# Patient Record
Sex: FEMALE | HISPANIC OR LATINO | Employment: FULL TIME | URBAN - METROPOLITAN AREA
[De-identification: names, ages, dates, MRNs, and addresses within clinical notes are randomized per-mention and may not be internally consistent; named-entity substitution may affect disease eponyms.]

---

## 2020-12-11 ENCOUNTER — OFFICE VISIT (OUTPATIENT)
Dept: FAMILY MEDICINE CLINIC | Facility: CLINIC | Age: 45
End: 2020-12-11

## 2020-12-11 VITALS
HEART RATE: 96 BPM | TEMPERATURE: 97.6 F | BODY MASS INDEX: 36.89 KG/M2 | OXYGEN SATURATION: 98 % | WEIGHT: 208.2 LBS | HEIGHT: 63 IN | DIASTOLIC BLOOD PRESSURE: 78 MMHG | RESPIRATION RATE: 18 BRPM | SYSTOLIC BLOOD PRESSURE: 132 MMHG

## 2020-12-11 DIAGNOSIS — E03.9 HYPOTHYROIDISM, UNSPECIFIED TYPE: ICD-10-CM

## 2020-12-11 DIAGNOSIS — M62.838 MUSCLE SPASM: ICD-10-CM

## 2020-12-11 DIAGNOSIS — Z12.31 SCREENING MAMMOGRAM, ENCOUNTER FOR: ICD-10-CM

## 2020-12-11 DIAGNOSIS — E11.69 TYPE 2 DIABETES MELLITUS WITH OTHER SPECIFIED COMPLICATION, WITHOUT LONG-TERM CURRENT USE OF INSULIN (HCC): Primary | ICD-10-CM

## 2020-12-11 DIAGNOSIS — Z00.00 ROUTINE CHECK-UP: ICD-10-CM

## 2020-12-11 PROBLEM — E11.9 DIABETES MELLITUS (HCC): Status: ACTIVE | Noted: 2020-12-11

## 2020-12-11 LAB — SL AMB POCT HEMOGLOBIN AIC: 8.2 (ref ?–6.5)

## 2020-12-11 PROCEDURE — 83036 HEMOGLOBIN GLYCOSYLATED A1C: CPT | Performed by: FAMILY MEDICINE

## 2020-12-11 PROCEDURE — 99203 OFFICE O/P NEW LOW 30 MIN: CPT | Performed by: FAMILY MEDICINE

## 2020-12-11 RX ORDER — LEVOTHYROXINE SODIUM 112 UG/1
112 TABLET ORAL
Qty: 30 TABLET | Refills: 3 | Status: SHIPPED | OUTPATIENT
Start: 2020-12-11 | End: 2021-01-11 | Stop reason: ALTCHOICE

## 2020-12-11 RX ORDER — LEVOTHYROXINE SODIUM 0.1 MG/1
100 TABLET ORAL DAILY
COMMUNITY
End: 2020-12-11 | Stop reason: ALTCHOICE

## 2020-12-11 RX ORDER — METHOCARBAMOL 500 MG/1
500 TABLET, FILM COATED ORAL DAILY
Qty: 30 TABLET | Refills: 0 | Status: SHIPPED | OUTPATIENT
Start: 2020-12-11 | End: 2021-01-11 | Stop reason: SDUPTHER

## 2020-12-22 ENCOUNTER — APPOINTMENT (OUTPATIENT)
Dept: LAB | Facility: HOSPITAL | Age: 45
End: 2020-12-22
Payer: COMMERCIAL

## 2020-12-22 DIAGNOSIS — E03.9 HYPOTHYROIDISM, UNSPECIFIED TYPE: ICD-10-CM

## 2020-12-22 LAB
ALBUMIN SERPL BCP-MCNC: 3.5 G/DL (ref 3.5–5)
ALP SERPL-CCNC: 80 U/L (ref 46–116)
ALT SERPL W P-5'-P-CCNC: 33 U/L (ref 12–78)
ANION GAP SERPL CALCULATED.3IONS-SCNC: 9 MMOL/L (ref 4–13)
AST SERPL W P-5'-P-CCNC: 15 U/L (ref 5–45)
BASOPHILS # BLD AUTO: 0.03 THOUSANDS/ΜL (ref 0–0.1)
BASOPHILS NFR BLD AUTO: 0 % (ref 0–1)
BILIRUB SERPL-MCNC: 0.34 MG/DL (ref 0.2–1)
BUN SERPL-MCNC: 13 MG/DL (ref 5–25)
CALCIUM SERPL-MCNC: 9.3 MG/DL (ref 8.3–10.1)
CHLORIDE SERPL-SCNC: 104 MMOL/L (ref 100–108)
CHOLEST SERPL-MCNC: 272 MG/DL (ref 50–200)
CO2 SERPL-SCNC: 28 MMOL/L (ref 21–32)
CREAT SERPL-MCNC: 0.62 MG/DL (ref 0.6–1.3)
EOSINOPHIL # BLD AUTO: 0.13 THOUSAND/ΜL (ref 0–0.61)
EOSINOPHIL NFR BLD AUTO: 2 % (ref 0–6)
ERYTHROCYTE [DISTWIDTH] IN BLOOD BY AUTOMATED COUNT: 12.7 % (ref 11.6–15.1)
GFR SERPL CREATININE-BSD FRML MDRD: 110 ML/MIN/1.73SQ M
GLUCOSE P FAST SERPL-MCNC: 143 MG/DL (ref 65–99)
HCT VFR BLD AUTO: 40.2 % (ref 34.8–46.1)
HDLC SERPL-MCNC: 47 MG/DL
HGB BLD-MCNC: 13.8 G/DL (ref 11.5–15.4)
IMM GRANULOCYTES # BLD AUTO: 0.04 THOUSAND/UL (ref 0–0.2)
IMM GRANULOCYTES NFR BLD AUTO: 1 % (ref 0–2)
LDLC SERPL CALC-MCNC: 173 MG/DL (ref 0–100)
LYMPHOCYTES # BLD AUTO: 2.98 THOUSANDS/ΜL (ref 0.6–4.47)
LYMPHOCYTES NFR BLD AUTO: 41 % (ref 14–44)
MCH RBC QN AUTO: 31.2 PG (ref 26.8–34.3)
MCHC RBC AUTO-ENTMCNC: 34.3 G/DL (ref 31.4–37.4)
MCV RBC AUTO: 91 FL (ref 82–98)
MONOCYTES # BLD AUTO: 0.53 THOUSAND/ΜL (ref 0.17–1.22)
MONOCYTES NFR BLD AUTO: 7 % (ref 4–12)
NEUTROPHILS # BLD AUTO: 3.57 THOUSANDS/ΜL (ref 1.85–7.62)
NEUTS SEG NFR BLD AUTO: 49 % (ref 43–75)
NONHDLC SERPL-MCNC: 225 MG/DL
NRBC BLD AUTO-RTO: 0 /100 WBCS
PLATELET # BLD AUTO: 328 THOUSANDS/UL (ref 149–390)
PMV BLD AUTO: 10.7 FL (ref 8.9–12.7)
POTASSIUM SERPL-SCNC: 4.2 MMOL/L (ref 3.5–5.3)
PROT SERPL-MCNC: 7.8 G/DL (ref 6.4–8.2)
RBC # BLD AUTO: 4.42 MILLION/UL (ref 3.81–5.12)
SODIUM SERPL-SCNC: 141 MMOL/L (ref 136–145)
TRIGL SERPL-MCNC: 259 MG/DL
TSH SERPL DL<=0.05 MIU/L-ACNC: 1.79 UIU/ML (ref 0.36–3.74)
WBC # BLD AUTO: 7.28 THOUSAND/UL (ref 4.31–10.16)

## 2020-12-22 PROCEDURE — 85025 COMPLETE CBC W/AUTO DIFF WBC: CPT

## 2020-12-22 PROCEDURE — 80053 COMPREHEN METABOLIC PANEL: CPT

## 2020-12-22 PROCEDURE — 84443 ASSAY THYROID STIM HORMONE: CPT

## 2020-12-22 PROCEDURE — 80061 LIPID PANEL: CPT

## 2020-12-22 PROCEDURE — 36415 COLL VENOUS BLD VENIPUNCTURE: CPT

## 2021-01-11 ENCOUNTER — OFFICE VISIT (OUTPATIENT)
Dept: FAMILY MEDICINE CLINIC | Facility: CLINIC | Age: 46
End: 2021-01-11

## 2021-01-11 VITALS
WEIGHT: 210 LBS | OXYGEN SATURATION: 98 % | DIASTOLIC BLOOD PRESSURE: 80 MMHG | HEIGHT: 63 IN | RESPIRATION RATE: 16 BRPM | TEMPERATURE: 98 F | HEART RATE: 90 BPM | BODY MASS INDEX: 37.21 KG/M2 | SYSTOLIC BLOOD PRESSURE: 124 MMHG

## 2021-01-11 DIAGNOSIS — M62.838 MUSCLE SPASM: ICD-10-CM

## 2021-01-11 DIAGNOSIS — E03.9 HYPOTHYROIDISM, UNSPECIFIED TYPE: ICD-10-CM

## 2021-01-11 DIAGNOSIS — M79.89 LEG SWELLING: ICD-10-CM

## 2021-01-11 DIAGNOSIS — E78.5 HYPERLIPIDEMIA, UNSPECIFIED HYPERLIPIDEMIA TYPE: ICD-10-CM

## 2021-01-11 DIAGNOSIS — E11.69 TYPE 2 DIABETES MELLITUS WITH OTHER SPECIFIED COMPLICATION, WITHOUT LONG-TERM CURRENT USE OF INSULIN (HCC): Primary | ICD-10-CM

## 2021-01-11 DIAGNOSIS — K21.9 GASTROESOPHAGEAL REFLUX DISEASE WITHOUT ESOPHAGITIS: ICD-10-CM

## 2021-01-11 PROCEDURE — 3008F BODY MASS INDEX DOCD: CPT | Performed by: INTERNAL MEDICINE

## 2021-01-11 PROCEDURE — 99213 OFFICE O/P EST LOW 20 MIN: CPT | Performed by: INTERNAL MEDICINE

## 2021-01-11 PROCEDURE — 1036F TOBACCO NON-USER: CPT | Performed by: INTERNAL MEDICINE

## 2021-01-11 RX ORDER — ATORVASTATIN CALCIUM 40 MG/1
40 TABLET, FILM COATED ORAL DAILY
Qty: 90 TABLET | Refills: 0 | Status: SHIPPED | OUTPATIENT
Start: 2021-01-11 | End: 2021-02-08 | Stop reason: SDUPTHER

## 2021-01-11 RX ORDER — GABAPENTIN 100 MG/1
100 CAPSULE ORAL 3 TIMES DAILY
Qty: 90 CAPSULE | Refills: 0 | Status: SHIPPED | OUTPATIENT
Start: 2021-01-11 | End: 2021-03-15

## 2021-01-11 RX ORDER — LEVOTHYROXINE SODIUM 0.1 MG/1
100 TABLET ORAL DAILY
Qty: 30 TABLET | Refills: 2 | Status: CANCELLED | OUTPATIENT
Start: 2021-01-11

## 2021-01-11 RX ORDER — METHOCARBAMOL 500 MG/1
500 TABLET, FILM COATED ORAL DAILY
Qty: 30 TABLET | Refills: 0 | Status: SHIPPED | OUTPATIENT
Start: 2021-01-11 | End: 2021-03-15

## 2021-01-11 RX ORDER — LEVOTHYROXINE SODIUM 112 UG/1
112 TABLET ORAL
Qty: 30 TABLET | Refills: 0 | Status: SHIPPED | OUTPATIENT
Start: 2021-01-11 | End: 2021-02-08 | Stop reason: SDUPTHER

## 2021-01-11 RX ORDER — FUROSEMIDE 20 MG/1
20 TABLET ORAL DAILY
Qty: 15 TABLET | Refills: 0 | Status: SHIPPED | OUTPATIENT
Start: 2021-01-11 | End: 2021-12-20 | Stop reason: SDUPTHER

## 2021-01-11 NOTE — PROGRESS NOTES
Assessment/Plan:    Hypothyroidism  -most recent TSH within normal limits  -currently on levothyroxine 112 mcg daily  -stated to have noticed sporadic palpitations since re-starting medication, was not taking it prior for about almost a year and recently re-started it in prior visit  -will send for T4 lab work for evaluation and assess if dose adjustment is needed    Diabetes mellitus ([facility] )    Lab Results   Component Value Date    HGBA1C 8 2 (A) 12/11/2020     -on Janumet 50/500 BID  -previously on Insulin back in [place] and was adjusted to PO medications  -hba1c has been as high as 10% and as low as 6%  -patient states to have been taking it only once a day since had some difficulties obtaining refills at pharmacy  -she states that tolerated medication well so far  -verified dosing and prescription and re-sent to pharmacy, encouraged patient to check with pharmacy to make sure appropriate refills are given  -encouraged to continue adherence to Diabetic diet   -will continue to assess during next visit and adjust medication as needed  -interested in diabetic counseling and nutrition, sent referral  -dry skin in foot soles, has been applying Gold Bond moisturizer for diabetics and working so far, will send referral to Podiatry as well    Hyperlipidemia  -reviewed most recent lipid panel with elevated cholesterol, TG and LDL  -states to have previously used statin in [place]   -will re-start on atorvastatin 40 mg daily  -repeat lipid panel after starting therapy in about 3 months to assess     Muscle spasm  -has tried robaxin, which states helps during sleep but when wakes up still has tension in area of the neck  -physical examination remarkable for neck muscle tension  -encouraged that can take Aleve PRN as well in combination to robaxin at night  -patient willing to try physical therapy, will send referral     Gastroesophageal reflux disease without esophagitis  -Hx of GERD for many years  -takes omeprazole OTC which works, will continue       Diagnoses and all orders for this visit:    Type 2 diabetes mellitus with other specified complication, without long-term current use of insulin (HCC)  -     sitaGLIPtin-metFORMIN (JANUMET)  MG per tablet; Take 2 tablets by mouth two times a day with meals  -     Ambulatory referral to Nutrition Services; Future  -     Ambulatory referral to Diabetic Education; Future  -     Ambulatory referral to Podiatry; Future  -     gabapentin (NEURONTIN) 100 mg capsule; Take 1 capsule (100 mg total) by mouth 3 (three) times a day    Hyperlipidemia, unspecified hyperlipidemia type  -     atorvastatin (LIPITOR) 40 mg tablet; Take 1 tablet (40 mg total) by mouth daily  -     Lipid panel; Future    Muscle spasm  -     Ambulatory referral to Physical Therapy; Future  -     methocarbamol (ROBAXIN) 500 mg tablet; Take 1 tablet (500 mg total) by mouth daily    Hypothyroidism, unspecified type  -     T4, free; Future  -     levothyroxine 112 mcg tablet; Take 1 tablet (112 mcg total) by mouth daily in the early morning    BMI 37 0-37 9, adult    Leg swelling  -     furosemide (LASIX) 20 mg tablet; Take 1 tablet (20 mg total) by mouth daily    Gastroesophageal reflux disease without esophagitis    Other orders  -     Cancel: levothyroxine 100 mcg tablet; Take 1 tablet (100 mcg total) by mouth daily          Subjective:      Patient ID: Nena Heath is a 41 y o  female  Case of 40 y/o female who came today for follow up  She indicates to have been compliant with her medications, although has been taking her DM medication inly once due to some difficulties with refills in the pharmacy  She states to still have significant neck muscle spasms and is willing to try physical therapy                The following portions of the patient's history were reviewed and updated as appropriate: allergies, current medications, past family history, past medical history, past social history, past surgical history and problem list     Review of Systems   Constitutional: Negative for fever  Eyes: Negative for visual disturbance  Respiratory: Negative for cough, shortness of breath and wheezing  Cardiovascular: Negative for chest pain, palpitations and leg swelling  Gastrointestinal: Negative for abdominal pain, diarrhea, nausea and vomiting  Musculoskeletal:        Neck muscle spasm   Skin: Negative for color change, pallor, rash and wound  Neurological: Negative for headaches  Psychiatric/Behavioral: The patient is not nervous/anxious  Objective:      /80 (BP Location: Right arm, Patient Position: Sitting, Cuff Size: Large)   Pulse 90   Temp 98 °F (36 7 °C) (Temporal)   Resp 16   Ht 5' 3" (1 6 m)   Wt 95 3 kg (210 lb)   SpO2 98%   BMI 37 20 kg/m²          Physical Exam  Vitals signs reviewed  Constitutional:       General: She is not in acute distress  Appearance: Normal appearance  She is not ill-appearing, toxic-appearing or diaphoretic  Eyes:      Extraocular Movements: Extraocular movements intact  Cardiovascular:      Pulses: Normal pulses  Heart sounds: Normal heart sounds  No murmur  Pulmonary:      Effort: Pulmonary effort is normal  No respiratory distress  Breath sounds: Normal breath sounds  No wheezing  Abdominal:      General: Abdomen is flat  Bowel sounds are normal  There is no distension  Palpations: Abdomen is soft  Tenderness: There is no abdominal tenderness  Musculoskeletal: Normal range of motion  General: No swelling, tenderness, deformity or signs of injury  Right lower leg: No edema  Left lower leg: No edema  Comments: Neck muscle tension upon palpation   Skin:     General: Skin is warm  Coloration: Skin is not jaundiced  Findings: No bruising or lesion  Neurological:      General: No focal deficit present  Mental Status: She is alert and oriented to person, place, and time     Psychiatric:         Mood and Affect: Mood normal

## 2021-01-12 NOTE — ASSESSMENT & PLAN NOTE
-reviewed most recent lipid panel with elevated cholesterol, TG and LDL  -states to have previously used statin in [place]   -will re-start on atorvastatin 40 mg daily  -repeat lipid panel after starting therapy in about 3 months to assess

## 2021-01-12 NOTE — ASSESSMENT & PLAN NOTE
Lab Results   Component Value Date    HGBA1C 8 2 (A) 12/11/2020     -on Janumet 50/500 BID  -previously on Insulin back in [place] and was adjusted to PO medications  -hba1c has been as high as 10% and as low as 6%  -patient states to have been taking it only once a day since had some difficulties obtaining refills at pharmacy  -she states that tolerated medication well so far  -verified dosing and prescription and re-sent to pharmacy, encouraged patient to check with pharmacy to make sure appropriate refills are given  -encouraged to continue adherence to Diabetic diet   -will continue to assess during next visit and adjust medication as needed  -interested in diabetic counseling and nutrition, sent referral  -dry skin in foot soles, has been applying Gold Bond moisturizer for diabetics and working so far, will send referral to Podiatry as well

## 2021-01-12 NOTE — ASSESSMENT & PLAN NOTE
-has tried robaxin, which states helps during sleep but when wakes up still has tension in area of the neck  -physical examination remarkable for neck muscle tension  -encouraged that can take Aleve PRN as well in combination to robaxin at night  -patient willing to try physical therapy, will send referral

## 2021-01-12 NOTE — ASSESSMENT & PLAN NOTE
-most recent TSH within normal limits  -currently on levothyroxine 112 mcg daily  -stated to have noticed sporadic palpitations since re-starting medication, was not taking it prior for about almost a year and recently re-started it in prior visit  -will send for T4 lab work for evaluation and assess if dose adjustment is needed

## 2021-01-26 ENCOUNTER — HOSPITAL ENCOUNTER (EMERGENCY)
Facility: HOSPITAL | Age: 46
Discharge: HOME | End: 2021-01-26
Attending: EMERGENCY MEDICINE | Admitting: EMERGENCY MEDICINE
Payer: COMMERCIAL

## 2021-01-26 VITALS
RESPIRATION RATE: 16 BRPM | HEART RATE: 86 BPM | SYSTOLIC BLOOD PRESSURE: 118 MMHG | WEIGHT: 209.44 LBS | DIASTOLIC BLOOD PRESSURE: 70 MMHG | OXYGEN SATURATION: 99 % | TEMPERATURE: 98.5 F | BODY MASS INDEX: 37.1 KG/M2

## 2021-01-26 DIAGNOSIS — M54.50 ACUTE LOW BACK PAIN: Primary | ICD-10-CM

## 2021-01-26 PROCEDURE — 99283 EMERGENCY DEPT VISIT LOW MDM: CPT

## 2021-01-26 PROCEDURE — 99284 EMERGENCY DEPT VISIT MOD MDM: CPT | Performed by: PHYSICIAN ASSISTANT

## 2021-01-26 PROCEDURE — 96372 THER/PROPH/DIAG INJ SC/IM: CPT

## 2021-01-26 RX ORDER — METHOCARBAMOL 750 MG/1
750 TABLET, FILM COATED ORAL 2 TIMES DAILY PRN
Qty: 20 TABLET | Refills: 0 | Status: SHIPPED | OUTPATIENT
Start: 2021-01-26 | End: 2021-03-15

## 2021-01-26 RX ORDER — NAPROXEN 500 MG/1
500 TABLET ORAL 2 TIMES DAILY PRN
Qty: 30 TABLET | Refills: 0 | Status: SHIPPED | OUTPATIENT
Start: 2021-01-26 | End: 2021-01-26 | Stop reason: SDUPTHER

## 2021-01-26 RX ORDER — METHOCARBAMOL 500 MG/1
1000 TABLET, FILM COATED ORAL ONCE
Status: COMPLETED | OUTPATIENT
Start: 2021-01-26 | End: 2021-01-26

## 2021-01-26 RX ORDER — NAPROXEN 500 MG/1
500 TABLET ORAL 2 TIMES DAILY PRN
Qty: 30 TABLET | Refills: 0 | Status: SHIPPED | OUTPATIENT
Start: 2021-01-26 | End: 2022-05-09

## 2021-01-26 RX ORDER — KETOROLAC TROMETHAMINE 30 MG/ML
15 INJECTION, SOLUTION INTRAMUSCULAR; INTRAVENOUS ONCE
Status: COMPLETED | OUTPATIENT
Start: 2021-01-26 | End: 2021-01-26

## 2021-01-26 RX ORDER — METHOCARBAMOL 750 MG/1
750 TABLET, FILM COATED ORAL 2 TIMES DAILY PRN
Qty: 20 TABLET | Refills: 0 | Status: SHIPPED | OUTPATIENT
Start: 2021-01-26 | End: 2021-01-26 | Stop reason: SDUPTHER

## 2021-01-26 RX ADMIN — METHOCARBAMOL 1000 MG: 500 TABLET ORAL at 17:29

## 2021-01-26 RX ADMIN — KETOROLAC TROMETHAMINE 15 MG: 30 INJECTION, SOLUTION INTRAMUSCULAR at 17:30

## 2021-01-26 NOTE — Clinical Note
Steve Bhardwja was seen and treated in our emergency department on 1/26/2021  Diagnosis:     Mary Hurley    She may return on this date: 01/28/2021         If you have any questions or concerns, please don't hesitate to call        Saul Valladares PA-C    ______________________________           _______________          _______________  Hospital Representative                              Date                                Time

## 2021-01-26 NOTE — DISCHARGE INSTRUCTIONS
Please refer to the attached information for strict return instructions  If symptoms worsen or new symptoms develop please return to the ER  Please follow up with our spine program if symptoms persist  Use prescribed medications as needed for pain/spasm

## 2021-01-27 NOTE — ED PROVIDER NOTES
History  Chief Complaint   Patient presents with    Back Pain     Pt w/ Hx of chronic back pain c/o worsening lower back pain since this am; Pt lifts boxes at work; Pt denies any bowel or urinary symptoms      Cheryle Marte is a 36-year-old female, history of chronic low back pain, presenting with lower back pain since this morning  Patient denies specific injury or trauma, but does report frequently lifting heavy boxes at work  Denies radiation of pain to lower extremities or to abdomen  Denies nausea, vomiting, or diarrhea  Denies dysuria, urinary urgency, or urinary frequency  Denies gross hematuria  No lower extremity numbness, or weakness  No loss of control of bowel or bladder function  No saddle anesthesia  No fevers/chills/IVDA  History provided by:  Patient   used: No    Back Pain  Location:  Lumbar spine  Quality:  Aching and cramping  Duration:  1 day  Timing:  Constant  Progression:  Waxing and waning  Chronicity:  Recurrent  Context: lifting heavy objects    Context: not falling and not recent injury    Relieved by:  None tried  Worsened by:  Nothing  Ineffective treatments:  None tried  Associated symptoms: no abdominal pain, no bladder incontinence, no bowel incontinence, no chest pain, no dysuria, no fever, no headaches, no numbness, no paresthesias, no pelvic pain, no perianal numbness, no tingling and no weakness    Risk factors: no hx of cancer, no recent surgery and no steroid use        Prior to Admission Medications   Prescriptions Last Dose Informant Patient Reported?  Taking?   atorvastatin (LIPITOR) 40 mg tablet   No No   Sig: Take 1 tablet (40 mg total) by mouth daily   furosemide (LASIX) 20 mg tablet   No No   Sig: Take 1 tablet (20 mg total) by mouth daily   gabapentin (NEURONTIN) 100 mg capsule   No No   Sig: Take 1 capsule (100 mg total) by mouth 3 (three) times a day   levothyroxine 112 mcg tablet   No No   Sig: Take 1 tablet (112 mcg total) by mouth daily in the early morning   methocarbamol (ROBAXIN) 500 mg tablet   No No   Sig: Take 1 tablet (500 mg total) by mouth daily   sitaGLIPtin-metFORMIN (JANUMET)  MG per tablet   No No   Sig: Take 2 tablets by mouth two times a day with meals      Facility-Administered Medications: None       Past Medical History:   Diagnosis Date    Diabetes mellitus ([facility] )     Disease of thyroid gland     Hypertension        Past Surgical History:   Procedure Laterality Date    HYSTERECTOMY         Family History   Problem Relation Age of Onset    Hypertension Mother     Heart disease Mother     Diabetes Mother     Thyroid disease Mother     Thyroid disease Father     Diabetes Father     Hypertension Father      I have reviewed and agree with the history as documented  E-Cigarette/Vaping    E-Cigarette Use Never User      E-Cigarette/Vaping Substances     Social History     Tobacco Use    Smoking status: Never Smoker    Smokeless tobacco: Never Used   Substance Use Topics    Alcohol use: Yes     Frequency: Monthly or less     Drinks per session: 1 or 2     Binge frequency: Never    Drug use: Never       Review of Systems   Constitutional: Negative for chills and fever  HENT: Negative for congestion, rhinorrhea and sore throat  Eyes: Negative for pain and visual disturbance  Respiratory: Negative for cough, shortness of breath and wheezing  Cardiovascular: Negative for chest pain and palpitations  Gastrointestinal: Negative for abdominal pain, bowel incontinence, nausea and vomiting  Genitourinary: Negative for bladder incontinence, dysuria, frequency, pelvic pain and urgency  Musculoskeletal: Positive for back pain  Negative for joint swelling, neck pain and neck stiffness  Skin: Negative for rash and wound  Neurological: Negative for dizziness, tingling, weakness, light-headedness, numbness, headaches and paresthesias         Physical Exam  Physical Exam  Constitutional:       General: She is not in acute distress  Appearance: She is well-developed  She is not diaphoretic  HENT:      Head: Normocephalic and atraumatic  Right Ear: External ear normal       Left Ear: External ear normal    Eyes:      Conjunctiva/sclera: Conjunctivae normal       Pupils: Pupils are equal, round, and reactive to light  Neck:      Musculoskeletal: Normal range of motion and neck supple  Cardiovascular:      Rate and Rhythm: Normal rate and regular rhythm  Heart sounds: Normal heart sounds  No murmur  No friction rub  No gallop  Pulmonary:      Effort: Pulmonary effort is normal  No respiratory distress  Breath sounds: Normal breath sounds  No wheezing  Abdominal:      General: There is no distension  Palpations: Abdomen is soft  Tenderness: There is no abdominal tenderness  Musculoskeletal:         General: Tenderness present  Comments: Tenderness to palpation to bilateral lumbar paraspinal musculature  No midline T/L TTP   5/5 strength, intact and symmetric sensation of bilateral lower extremities  Normal gait  Negative straight leg raise bilaterally  Lymphadenopathy:      Cervical: No cervical adenopathy  Skin:     General: Skin is warm and dry  Capillary Refill: Capillary refill takes less than 2 seconds  Findings: No erythema or rash  Neurological:      Mental Status: She is alert and oriented to person, place, and time  Motor: No abnormal muscle tone  Coordination: Coordination normal    Psychiatric:         Behavior: Behavior normal          Thought Content:  Thought content normal          Judgment: Judgment normal          Vital Signs  ED Triage Vitals   Temperature Pulse Respirations Blood Pressure SpO2   01/26/21 1630 01/26/21 1630 01/26/21 1630 01/26/21 1631 01/26/21 1630   98 5 °F (36 9 °C) 86 16 118/70 99 %      Temp Source Heart Rate Source Patient Position - Orthostatic VS BP Location FiO2 (%)   01/26/21 1630 01/26/21 1630 -- -- -- Temporal Monitor         Pain Score       --                  Vitals:    01/26/21 1630 01/26/21 1631   BP:  118/70   Pulse: 86          Visual Acuity      ED Medications  Medications   ketorolac (TORADOL) injection 15 mg (15 mg Intramuscular Given 1/26/21 1730)   methocarbamol (ROBAXIN) tablet 1,000 mg (1,000 mg Oral Given 1/26/21 1729)       Diagnostic Studies  Results Reviewed     None                 No orders to display              Procedures  Procedures         ED Course                             SBIRT 20yo+      Most Recent Value   SBIRT (24 yo +)   In order to provide better care to our patients, we are screening all of our patients for alcohol and drug use  Would it be okay to ask you these screening questions? No Filed at: 01/26/2021 1646                    MDM  Number of Diagnoses or Management Options  Acute low back pain:   Diagnosis management comments: Bilateral lower back pain since this morning after lifting heavy objects at work  No specific fall or trauma  Bilateral paraspinal tenderness on exam   No lower extremity neurologic symptoms or deficits by exam   Will treat with Toradol, Robaxin here and similar home  Follow-up with spine prior recommended  Strict return to ED indications discussed  Patient Progress  Patient progress: stable      Disposition  Final diagnoses:   Acute low back pain     Time reflects when diagnosis was documented in both MDM as applicable and the Disposition within this note     Time User Action Codes Description Comment    1/26/2021  6:10 PM Crystal Liang [M54 5] Acute low back pain       ED Disposition     ED Disposition Condition Date/Time Comment    Discharge Stable Tue Jan 26, 2021  6:10 PM Jerrell Rucker discharge to home/self care              Follow-up Information     Follow up With Specialties Details Why Contact Info Additional Information    [facility] Physical Therapy Schedule an appointment as soon as possible for a visit   [phone]    [address] Emergency Department Emergency Medicine  If symptoms worsen [facility] [id number] [address] [facility] Emergency Department, [address]  , [place], [place], [address]          Discharge Medication List as of 1/26/2021  6:12 PM      START taking these medications    Details   !! methocarbamol (ROBAXIN) 750 mg tablet Take 1 tablet (750 mg total) by mouth 2 (two) times a day as needed for muscle spasms, Starting Tue 1/26/2021, Normal      naproxen (NAPROSYN) 500 mg tablet Take 1 tablet (500 mg total) by mouth 2 (two) times a day as needed for mild pain or moderate pain, Starting Tue 1/26/2021, Normal       !! - Potential duplicate medications found  Please discuss with provider  CONTINUE these medications which have NOT CHANGED    Details   atorvastatin (LIPITOR) 40 mg tablet Take 1 tablet (40 mg total) by mouth daily, Starting Mon 1/11/2021, Normal      furosemide (LASIX) 20 mg tablet Take 1 tablet (20 mg total) by mouth daily, Starting Mon 1/11/2021, Normal      gabapentin (NEURONTIN) 100 mg capsule Take 1 capsule (100 mg total) by mouth 3 (three) times a day, Starting Mon 1/11/2021, Normal      levothyroxine 112 mcg tablet Take 1 tablet (112 mcg total) by mouth daily in the early morning, Starting Mon 1/11/2021, Normal      !! methocarbamol (ROBAXIN) 500 mg tablet Take 1 tablet (500 mg total) by mouth daily, Starting Mon 1/11/2021, Normal      sitaGLIPtin-metFORMIN (JANUMET)  MG per tablet Take 2 tablets by mouth two times a day with meals, Normal       !! - Potential duplicate medications found  Please discuss with provider  No discharge procedures on file      PDMP Review     None          ED Provider  Electronically Signed by           Elizabeth Valdivia PA-C  01/27/21 0278

## 2021-02-03 ENCOUNTER — LAB (OUTPATIENT)
Dept: LAB | Facility: HOSPITAL | Age: 46
End: 2021-02-03
Payer: COMMERCIAL

## 2021-02-03 DIAGNOSIS — E78.5 HYPERLIPIDEMIA, UNSPECIFIED HYPERLIPIDEMIA TYPE: ICD-10-CM

## 2021-02-03 DIAGNOSIS — E03.9 HYPOTHYROIDISM, UNSPECIFIED TYPE: ICD-10-CM

## 2021-02-03 LAB
CHOLEST SERPL-MCNC: 254 MG/DL (ref 50–200)
HDLC SERPL-MCNC: 44 MG/DL
LDLC SERPL CALC-MCNC: 154 MG/DL (ref 0–100)
NONHDLC SERPL-MCNC: 210 MG/DL
T4 FREE SERPL-MCNC: 0.79 NG/DL (ref 0.76–1.46)
TRIGL SERPL-MCNC: 282 MG/DL

## 2021-02-03 PROCEDURE — 80061 LIPID PANEL: CPT

## 2021-02-03 PROCEDURE — 36415 COLL VENOUS BLD VENIPUNCTURE: CPT

## 2021-02-03 PROCEDURE — 84439 ASSAY OF FREE THYROXINE: CPT

## 2021-02-04 ENCOUNTER — TELEPHONE (OUTPATIENT)
Dept: FAMILY MEDICINE CLINIC | Facility: CLINIC | Age: 46
End: 2021-02-04

## 2021-02-04 NOTE — TELEPHONE ENCOUNTER
Tried calling patient at this number, however received automatic message that her Voicemail box has not been set up, was not able to leave a message    DDS

## 2021-02-08 ENCOUNTER — OFFICE VISIT (OUTPATIENT)
Dept: FAMILY MEDICINE CLINIC | Facility: CLINIC | Age: 46
End: 2021-02-08

## 2021-02-08 ENCOUNTER — TELEPHONE (OUTPATIENT)
Dept: FAMILY MEDICINE CLINIC | Facility: CLINIC | Age: 46
End: 2021-02-08

## 2021-02-08 VITALS
WEIGHT: 213 LBS | RESPIRATION RATE: 18 BRPM | SYSTOLIC BLOOD PRESSURE: 124 MMHG | HEIGHT: 63 IN | DIASTOLIC BLOOD PRESSURE: 76 MMHG | TEMPERATURE: 97.6 F | OXYGEN SATURATION: 99 % | BODY MASS INDEX: 37.74 KG/M2 | HEART RATE: 97 BPM

## 2021-02-08 VITALS
TEMPERATURE: 97.6 F | WEIGHT: 213 LBS | SYSTOLIC BLOOD PRESSURE: 124 MMHG | HEART RATE: 97 BPM | RESPIRATION RATE: 18 BRPM | DIASTOLIC BLOOD PRESSURE: 76 MMHG | OXYGEN SATURATION: 99 % | HEIGHT: 63 IN | BODY MASS INDEX: 37.74 KG/M2

## 2021-02-08 DIAGNOSIS — E03.9 HYPOTHYROIDISM, UNSPECIFIED TYPE: ICD-10-CM

## 2021-02-08 DIAGNOSIS — E11.69 TYPE 2 DIABETES MELLITUS WITH OTHER SPECIFIED COMPLICATION, WITHOUT LONG-TERM CURRENT USE OF INSULIN (HCC): Primary | ICD-10-CM

## 2021-02-08 DIAGNOSIS — E11.69 TYPE 2 DIABETES MELLITUS WITH OTHER SPECIFIED COMPLICATION, WITHOUT LONG-TERM CURRENT USE OF INSULIN (HCC): ICD-10-CM

## 2021-02-08 DIAGNOSIS — B35.1 ONYCHOMYCOSIS: ICD-10-CM

## 2021-02-08 DIAGNOSIS — E78.5 HYPERLIPIDEMIA, UNSPECIFIED HYPERLIPIDEMIA TYPE: ICD-10-CM

## 2021-02-08 DIAGNOSIS — B02.9 HERPES ZOSTER WITHOUT COMPLICATION: ICD-10-CM

## 2021-02-08 DIAGNOSIS — B35.3 TINEA PEDIS OF BOTH FEET: Primary | ICD-10-CM

## 2021-02-08 PROCEDURE — 99203 OFFICE O/P NEW LOW 30 MIN: CPT | Performed by: PODIATRIST

## 2021-02-08 PROCEDURE — 99213 OFFICE O/P EST LOW 20 MIN: CPT | Performed by: INTERNAL MEDICINE

## 2021-02-08 RX ORDER — ATORVASTATIN CALCIUM 40 MG/1
40 TABLET, FILM COATED ORAL DAILY
Qty: 90 TABLET | Refills: 1 | Status: SHIPPED | OUTPATIENT
Start: 2021-02-08 | End: 2021-12-20 | Stop reason: SDUPTHER

## 2021-02-08 RX ORDER — KETOCONAZOLE 20 MG/G
CREAM TOPICAL DAILY
Qty: 15 G | Refills: 0 | Status: SHIPPED | OUTPATIENT
Start: 2021-02-08 | End: 2022-05-09

## 2021-02-08 RX ORDER — LEVOTHYROXINE SODIUM 112 UG/1
112 TABLET ORAL
Qty: 30 TABLET | Refills: 0 | Status: SHIPPED | OUTPATIENT
Start: 2021-02-08 | End: 2021-05-19 | Stop reason: SDUPTHER

## 2021-02-08 RX ORDER — VALACYCLOVIR HYDROCHLORIDE 1 G/1
1000 TABLET, FILM COATED ORAL EVERY 8 HOURS
Qty: 21 TABLET | Refills: 0 | Status: SHIPPED | OUTPATIENT
Start: 2021-02-08 | End: 2021-12-20 | Stop reason: ALTCHOICE

## 2021-02-08 NOTE — PATIENT INSTRUCTIONS
Ejercicios para la espalda baja   LO QUE NECESITA SABER:   Los ejercicios de la espalda baja ayudan a sanar y a fortalecer los músculos de lewis espalda para evitar otra lesión  Pregúntele a lewis médico si usted necesita acudir con un fisioterapeuta para que le indique ejercicios más avanzado  INSTRUCCIONES SOBRE [facility]:   Regrese a la chase de emergencias si:  · Usted tiene dolor severo que le impide moverse  Comuníquese con lewis médico si:  · Lewis dolor empeora  · Usted tiene un dolor nuevo  · Usted tiene preguntas o inquietudes acerca de lewis condición o cuidado  Realice los ejercicios para la espalda baja de manera trevino:  · Nathanael martha ejercicios sobre ari colchoneta o superficie firme (no en la cama) para yazmin soporte a la columna y evitar dolor en la parte baja de la espalda  · Muévase lenta y suavemente  Evite movimientos rápidos o bruscos  · Respire normalmente  No contenga la respiración  · Deténgase si siente dolor  Es normal que sienta cierta molestia al principio  Practicar los ejercicios con regularidad ayudará a disminuir lewis incomodidad con el paso del [place]  Ejercicios para la espalda baja: Lewis médico podría recomendarle que realice ejercicios para la espalda de 10 a 30 minutos cada día  También podría recomendarle que nathanael ejercicios 1 a 3 veces cada día  Pregunte a lewis médico cuáles ejercicios son los mejores para usted y con qué frecuencia hacerlos  · Bombeo del tobillo: Acuéstese boca arriba  Levante lewis pie (con martha dedos apuntando hacia lewis miles)  Luego, baje lewis pie (con los dedos apuntando lejos de usted)  Repita sera ejercicio 10 veces en cada lado  · Deslizamiento de talón: Acuéstese boca arriba  Muy despacio doble ari pierna y luego enderécela  Luego, doble la otra pierna y enderécela  Repita 10 veces en cada lado  · Inclinación pélvica: Acuéstese boca arriba con martha rodillas dobladas y martha pies planos sobre el piso   Coloque martha brazos en Luis Enrique Xavier posición relajada junto a lewis cuerpo  Contraiga los músculos de lewis abdomen y aplane lewis espalda contra el piso  Sostenga está posición por 5 segundos  Repita 5 veces  · Estiramiento de la espalda: Acuéstese boca arriba con martha christ detrás de lewis miles  Doble martha rodillas y gire la mitad de lewis cuerpo hacia un lado  Mantenga esta posición por 10 segundos  Repita 3 veces en cada lado  · Levantamiento de la pierna estirada: Acuéstese boca [place]ier [place] con ari pierna estirada  Doble la otra rodilla  Contraiga lewis abdomen y luego levante lentamente la pierna estirada entre 6 a 12 pulgadas del piso  Mantenga esta posición por 1 a 5 segundos  Baje lewis pierna lentamente  Repita 10 veces en cada pierna  · Rodillas al pecho: Acuéstese boca arriba con martha rodillas dobladas y martha pies planos sobre el piso  Yevonne Lewis ari de las rodillas hacia lewis pecho y sosténgala por 5 segundos  Regrese lewis pierna a la posición inicial  Levante la otra rodilla hacia el pecho y sosténgala por 5 segundos  Jameson esto 5 veces en cada lado  · Posición antwon camello: Coloque martha christ y [employer]  Arquee lewis espalda [place]cus [place], hacia el techo y [facility]  Arquee lewis keren dorsal lo más posible  Sostenga está posición por 5 segundos  Levante lewis miles hacia arriba y baje lewis pecho hacia el piso  Sostenga está posición por 5 segundos  Jameson 3 series o andrew se le indique  · Posición de cuclillas contra la pared: Párese con lewis espalda contra la pared  Contraiga los músculos de lewis abdomen  Lentamente deslice lewis cuerpo hasta que martha rodillas queden dobladas en un ángulo de 45 grados  Mantenga esta posición por 5 segundos  Deslice lentamente lewis espalda hacia arriba hasta quedar de pie  Repita 10 veces  · Posición de acurrucarse: Acuéstese boca arriba con martha rodillas dobladas y martha pies planos sobre el piso  Coloque martha christ con las titi hacia abajo debajo de la curva de la parte baja de lewis espalda  Después, con martha codos [employer], levante martha hombros y [place] 2 a 3 pulgadas  Mantenga lewis miles a la misma altura de martha hombros  Mantenga esta posición por 5 segundos  Cuando usted pueda hacer sera ejercicio sin sentir dolor por 10 a 15 segundos, puede entonces añadir ari rotación  Mientras martha hombros y lewis pecho estén levantados del piso, voltee levemente hacia la izquierda y [place]  Repita en el otro lado  · Ejercicio pájaro sam: Coloque martha christ y rodillas sobre el piso  Mantenga martha muñecas directamente debajo de martha hombros y martha rodillas directamente debajo de martha caderas  Contraiga lewis ombligo hacia adentro en dirección a lewis columna  No estire ni arquee lewis espalda  Ponga tensos martha músculos abdominales  Levante un brazo extendido para que se alinee con lewis miles  Luego, levante la pierna opuesta a lewis brazo  Mantenga esta posición por 15 segundos  Baje lewis [place] y pierna lentamente y [place] de lado  Jameson 5 series  © Copyright [address] Information is for End User's use only and may not be sold, redistributed or otherwise used for commercial purposes  All illustrations and images included in CareNotes® are the copyrighted property of A D A M , Inc  or [address] [place] [address] es sólo para uso en educación  Lewis intención no es darle un consejo médico sobre enfermedades o tratamientos  Colsulte con lewis Sonia Lint farmacéutico antes de seguir cualquier régimen médico para saber si es seguro y efectivo para usted

## 2021-02-08 NOTE — ASSESSMENT & PLAN NOTE
-reviewed most recent lipid panel, slight improvement after re-starting atorvastatin 40 mg daily  -encouraged continued adherence to low fat, low cholesterol diet

## 2021-02-08 NOTE — TELEPHONE ENCOUNTER
Spoke with a family member who asked that I call back in another hour or so, patient not home now  Will call back    DDS

## 2021-02-08 NOTE — ASSESSMENT & PLAN NOTE
-reviewed labs; normal TSH and T4 with current levothyroxine dosing of 114 mcg daily  -will continue with current treatment

## 2021-02-08 NOTE — ASSESSMENT & PLAN NOTE
-Hx of recurrent herpes zoster flare-ups; treated in [place] at one point, no further treatment  -flare ups most frequent under stress; last flare up started yesterday with blisters in the buttocks area  -prodrome of left lower extremity myalgia and burning sensation  -physical examination remarkable for small blister like lesions in the region of right buttock  -will send prescription for valacyclovir to take 3 times a day for 7 days and also topical pramoxine/ calamine to apply in affected area as much as 4 times a day as needed  -to notify back if symptoms persists  -encouraged hand washing precautions to prevent spread during flare ups

## 2021-02-08 NOTE — ASSESSMENT & PLAN NOTE
-has noticed struggle with weight loss despite progression of adjustment to diet  -interested in evaluation with bariatrics for evaluation and recommendations  -referral placed  -encouraged healthy lifestyle diet with low fat, low cholesterol, low carbohydrates and exercising as tolerated

## 2021-02-08 NOTE — PROGRESS NOTES
Podiatry Clinic Visit  Moose Limon 41 y o  female MRN: [id number]  Encounter: [id number]    Assessment/Plan        Diagnoses and all orders for this visit:    Tinea pedis of both feet  -     ketoconazole (NIZORAL) 2 % cream; Apply topically daily    Type 2 diabetes mellitus with other specified complication, without long-term current use of insulin ([facility] [address] )  -     Ambulatory referral to Podiatry    Onychomycosis  -     ciclopirox (PENLAC) 8 % solution; Apply topically daily at bedtime       Plan:   Patient was seen/examined  All questions and concerns addressed   Educated patient on proper diabetic foot care; checking feet every day, wearing white socks, always wearing diabetic shoes even in house, tight glycemic control, proper low-sugar diet and exercise    Ciclopirox for treatment of fungal nails b/l, dicussed treatment options including topicals and PO terbinafine   Ketoconazole 2% for tinea pedis, educated patient on proper foot hygiene and use of antiperspirant to prevent excess moisture while working on her feet for 12h per day   RTC in 3 month    Dr Lali Johnson was present during this entire procedure  History of Present Illness     HPI:  Tinea  Patient complains of probable tinea  Lesion is located on the bilateral plantar foot  Symptoms include cracked, fissured skin with erythema located between the toes on the bilateral foot with patches of scaly skin located on the bilateral plantar foot  She also complains of thickened, discolored nails  Symptoms have been ongoing for about 2 months  Previous evaluation and treatment has included none  Patient denies nausea, vomiting, chest pain, shortness of breath, chills, fever  Review of Systems   Constitutional: Negative  HENT: Negative  Eyes: Negative  Respiratory: Negative  Cardiovascular: Negative  Gastrointestinal: Negative      Musculoskeletal: negative  Skin: Itching, burning rash and thickened, discolored nails  Neurological: Negative  Historical Information   Past Medical History:   Diagnosis Date    Diabetes mellitus ([address] )     Disease of thyroid gland     Hypertension      Past Surgical History:   Procedure Laterality Date    HYSTERECTOMY       Social History   Social History     Substance and Sexual Activity   Alcohol Use Yes    Frequency: Monthly or less    Drinks per session: 1 or 2    Binge frequency: Never     Social History     Substance and Sexual Activity   Drug Use Never     Social History     Tobacco Use   Smoking Status Never Smoker   Smokeless Tobacco Never Used     Family History:   Family History   Problem Relation Age of Onset    Hypertension Mother     Heart disease Mother     Diabetes Mother     Thyroid disease Mother     Thyroid disease Father     Diabetes Father     Hypertension Father        Meds/Allergies   (Not in a hospital admission)    Allergies   Allergen Reactions    Penicillins Rash       Objective     Current Vitals:   Blood Pressure: 124/76 (02/08/21 0947)  Pulse: 97 (02/08/21 0947)  Temperature: 97 6 °F (36 4 °C) (02/08/21 0947)  Temp Source: Temporal (02/08/21 0947)  Respirations: 18 (02/08/21 0947)  Height: 5' 3" (160 cm) (02/08/21 0947)  Weight - Scale: 96 6 kg (213 lb) (02/08/21 0947)  SpO2: 99 % (02/08/21 0947)        /76 (BP Location: Left arm, Patient Position: Sitting, Cuff Size: Standard)   Pulse 97   Temp 97 6 °F (36 4 °C) (Temporal)   Resp 18   Ht 5' 3" (1 6 m)   Wt 96 6 kg (213 lb)   SpO2 99%   BMI 37 73 kg/m²       Lower Extremity Exam:    Pulses:  Right foot DP +2; PT +2     Left foot DP +2; PT +2    Edema:  There is no lower extremity edema bilateral     Sensation to 5 07 Bartow-Vivek nylon filament:      Toes positive bilaterally      Midfoot  positive bilaterally      Heel positive bilaterally      Leg positive bilaterally    Vibratory sense :       Distal Foot  positive bilaterally      Malleolus     positive bilaterally Sharp/Dull sense is positive bilaterally      Skin: Erythema, scaling vesicular rash in a moccasin type distrubution  Interdigital maceration noted of webspaces 3 and 4 bilaterally  Musculoskeletal:     There is 5/5 strength throughout the bilateral lower extremity  full ankle range of motion with well maintained subtalar range of motion  There is no tenderness over the foot and ankle          There is not foot deformities

## 2021-02-08 NOTE — ASSESSMENT & PLAN NOTE
Lab Results   Component Value Date    HGBA1C 8 2 (A) 12/11/2020     -currently on Janumet 100/1000 mg BID; tolerating well  -has noticed blood sugars at home around 130's after re-starting medications  -trying to maintain adherence to diabetic diet; encouraged to continue to do so  -hba1c for next visit  -pending podiatry appointment today

## 2021-02-08 NOTE — PROGRESS NOTES
Assessment/Plan:    Diabetes mellitus ([facility] )    Lab Results   Component Value Date    HGBA1C 8 2 (A) 12/11/2020     -currently on Janumet 100/1000 mg BID; tolerating well  -has noticed blood sugars at home around 130's after re-starting medications  -trying to maintain adherence to diabetic diet; encouraged to continue to do so  -hba1c for next visit  -pending podiatry appointment today    Hypothyroidism  -reviewed labs; normal TSH and T4 with current levothyroxine dosing of 114 mcg daily  -will continue with current treatment     Hyperlipidemia  -reviewed most recent lipid panel, slight improvement after re-starting atorvastatin 40 mg daily  -encouraged continued adherence to low fat, low cholesterol diet      BMI 36 0-36 9,adult  -has noticed struggle with weight loss despite progression of adjustment to diet  -interested in evaluation with bariatrics for evaluation and recommendations  -referral placed  -encouraged healthy lifestyle diet with low fat, low cholesterol, low carbohydrates and exercising as tolerated    Herpes zoster without complication  -Hx of recurrent herpes zoster flare-ups; treated in [place] at one point, no further treatment  -flare ups most frequent under stress; last flare up started yesterday with blisters in the buttocks area  -prodrome of left lower extremity myalgia and burning sensation  -physical examination remarkable for small blister like lesions in the region of right buttock  -will send prescription for valacyclovir to take 3 times a day for 7 days and also topical pramoxine/ calamine to apply in affected area as much as 4 times a day as needed  -to notify back if symptoms persists  -encouraged hand washing precautions to prevent spread during flare ups       Diagnoses and all orders for this visit:    Type 2 diabetes mellitus with other specified complication, without long-term current use of insulin ([facility] )  -     HEMOGLOBIN A1C W/ EAG ESTIMATION;  Future  -     Microalbumin / creatinine urine ratio  -     sitaGLIPtin-metFORMIN (JANUMET)  MG per tablet; Take 2 tablets by mouth two times a day with meals    Hypothyroidism, unspecified type  -     levothyroxine 112 mcg tablet; Take 1 tablet (112 mcg total) by mouth daily in the early morning    BMI 37 0-37 9, adult  -     Ambulatory referral to Weight Management; Future    Hyperlipidemia, unspecified hyperlipidemia type  -     atorvastatin (LIPITOR) 40 mg tablet; Take 1 tablet (40 mg total) by mouth daily    Herpes zoster without complication  -     valACYclovir (VALTREX) 1,000 mg tablet; Take 1 tablet (1,000 mg total) by mouth every 8 (eight) hours for 7 days  -     pramoxine-calamine (CALADRYL) 1-8 % lotion; Apply topically 4 (four) times a day as needed for itching or irritation    BMI 36 0-36 9,adult          Subjective:      Patient ID: Kolby Baird is a 41 y o  female  Case of 40 y/o female who came for follow up on DM and hypothyroid  She states to have been adherent to medications and progressively improving in diet but has had difficulties decreasing weight and would like to explore referral to [facility] for evaluation and recommendations  She also expresses to have had recent Shingles flare up about 1 days ago in area of buttocks, happens usually every month due to stress  The following portions of the patient's history were reviewed and updated as appropriate: allergies, current medications, past family history, past medical history, past social history, past surgical history and problem list     Review of Systems   Constitutional: Negative for fever  Eyes: Negative for visual disturbance  Respiratory: Negative for cough, shortness of breath and wheezing  Cardiovascular: Negative for chest pain, palpitations and leg swelling  Gastrointestinal: Negative for abdominal pain, constipation, diarrhea, nausea and vomiting  Musculoskeletal: Positive for myalgias     Skin: Positive for rash ( blister like in area of buttocks )  Negative for color change, pallor and wound  Neurological: Negative for headaches  Psychiatric/Behavioral: The patient is not nervous/anxious  Objective:      /76 (BP Location: Left arm, Patient Position: Sitting, Cuff Size: Large)   Pulse 97   Temp 97 6 °F (36 4 °C) (Temporal)   Resp 18   Ht 5' 3" (1 6 m)   Wt 96 6 kg (213 lb)   SpO2 99%   BMI 37 73 kg/m²          Physical Exam  Vitals signs reviewed  Constitutional:       General: She is not in acute distress  Appearance: Normal appearance  She is not ill-appearing, toxic-appearing or diaphoretic  Eyes:      Extraocular Movements: Extraocular movements intact  Neck:      Musculoskeletal: Normal range of motion  Cardiovascular:      Rate and Rhythm: Normal rate and regular rhythm  Pulses: Normal pulses  Heart sounds: Normal heart sounds  No murmur  Pulmonary:      Effort: Pulmonary effort is normal  No respiratory distress  Breath sounds: Normal breath sounds  No wheezing  Abdominal:      General: Abdomen is flat  Bowel sounds are normal  There is no distension  Palpations: Abdomen is soft  Tenderness: There is no abdominal tenderness  Musculoskeletal: Normal range of motion  General: No swelling, tenderness, deformity or signs of injury  Right lower leg: No edema  Left lower leg: No edema  Skin:     General: Skin is warm  Coloration: Skin is not jaundiced or pale  Findings: Rash ( blister like rash in area of right buttock) present  No bruising, erythema or lesion  Neurological:      Mental Status: She is alert and oriented to person, place, and time  Psychiatric:         Mood and Affect: Mood normal            BMI Counseling: Body mass index is 37 73 kg/m²   The BMI is above normal  Nutrition recommendations include reducing portion sizes, decreasing overall calorie intake, 3-5 servings of fruits/vegetables daily, reducing fast food intake, consuming healthier snacks, decreasing soda and/or juice intake, moderation in carbohydrate intake, increasing intake of lean protein, reducing intake of saturated fat and trans fat and reducing intake of cholesterol  Exercise recommendations include exercising 3-5 times per week  Patient referred to bariatric surgery due to patient being obese

## 2021-02-09 ENCOUNTER — TELEMEDICINE (OUTPATIENT)
Dept: FAMILY MEDICINE CLINIC | Facility: CLINIC | Age: 46
End: 2021-02-09
Payer: COMMERCIAL

## 2021-02-09 DIAGNOSIS — E11.9 TYPE 2 DIABETES MELLITUS WITHOUT COMPLICATION, WITHOUT LONG-TERM CURRENT USE OF INSULIN (HCC): Primary | ICD-10-CM

## 2021-02-09 DIAGNOSIS — E11.69 TYPE 2 DIABETES MELLITUS WITH OTHER SPECIFIED COMPLICATION, WITHOUT LONG-TERM CURRENT USE OF INSULIN (HCC): ICD-10-CM

## 2021-02-09 PROCEDURE — G0108 DIAB MANAGE TRN  PER INDIV: HCPCS | Performed by: DIETITIAN, REGISTERED

## 2021-02-09 NOTE — PROGRESS NOTES
Virtual Brief Visit  It was my intent to perform this visit via video technology but the patient was not able to do a video connection so the visit was completed via audio telephone only  Assessment/Plan:  Type 2 Diabetes Class Assessment    HPI: Met with Franca Montano for DSME Initial visit  Tika Elliott has Type 2 Diabetes  Diabetes Assessment  Visit Type: Initial visit  Present at Session: patient   Special Learning Needs: No  Barriers to Learning: no barriers    How do you feel about making lifestyle changes at this time? "ok"  How would you rate your current knowledge of diabetes? fair to "pretty good"  How confident are you that will be able to take better control of your diabetes?: good    How long have you had diabetes? 2 - 3 years  Have you had diabetes education in the past?: No  Do you have any family members with diabetes?: Yes - both parents, & siblings  Do you monitor your blood sugar? no  Type of blood sugar monitor: patient states she needs a new meter  How old is your meter?: needs a new one  How often do you test your blood sugars?:patient used to test once/day, fasting level  Do you keep a written record of your blood sugars? No   Blood sugar log with patient today and reviewed by educator?: asked patient about her usual blood sugar levels when she was checking  Blood Sugar ranges:    Fastin to 155   Before meals:n/a   2 hours after meals: n/a  Any financial concerns pertaining to your diabetes supplies, medication or care?: No  Have you ever experienced hypoglycemia?:  Yes  Have you ever been hospitalized or gone to the ER due to your blood sugars?: No  How do you treat low blood sugars?: drinks OJ  How do you treat high blood sugars? Doesn't know  Do you wear a Diabetes I D ?: no, will provide info   Where do you dispose of your sharps (needles,lancetes)?: will provide patient with information      Ht Readings from Last 1 Encounters:   21 5' 3" (1 6 m)     Wt Readings from Last 3 Encounters:   02/08/21 96 6 kg (213 lb)   02/08/21 96 6 kg (213 lb)   01/26/21 95 kg (209 lb 7 oz)        There is no height or weight on file to calculate BMI  Lab Results   Component Value Date    HGBA1C 8 2 (A) 12/11/2020       No results found for: CHOL  Lab Results   Component Value Date    HDL 44 02/03/2021    HDL 47 12/22/2020     Lab Results   Component Value Date    LDLCALC 154 (H) 02/03/2021    LDLCALC 173 (H) 12/22/2020     Lab Results   Component Value Date    TRIG 282 (H) 02/03/2021    TRIG 259 (H) 12/22/2020     No results found for: CHOLHDL  No results found for: Ethel Oneill    Weight Change: No    Diet Assessment    Do you follow any special diet presently?: Yes - patient states she tries to eat healthy, chooses high fiber carbohydrates, avoids high sugar drinks  She admits she likes sweets, but tries to stay away from them too often  She works night shift, so has an early dinner @ home before work: rice & beans, meat or chicken and vegetables  At work she  Gets 4 breaks (works 7pm - 7am) & snacks on fresh fruit, sandwich made with whole wheat bread, greek yogurt, special K cereal with milk  She drinks mostly water and seltzer water  Reminded her to limit portions of fruit juice  Who cooks at home?:  patient and spouse  Who does the grocery shopping?: patient and spouse   How frequently do you eat out?: rarely    Activity Assessment    Exercise: no, but patient states she is active at work       Lifestyle/Social Assessment    Racial/ethnic group:                                       Primary Language: Lithuanian  Marital Status:   Education Level: High School Graduate   Work status: Full Time  Type of job and hours: 7pm - 7am, works at [employer] lives in your household?: spouse and children  Who is you primary support person(s): spouse and children   Describe your quality of life currently?: good  Any concerns for your safety?: No  Any Rastafarian or cultural practices that may affect your diabetes care: No  Do you have a decrease or loss of hearing?: No  Do you have a decrease or loss of vision?: Yes - sometimes blurry  When was the last time you had an ophthalmology exam?: over a year ago  When was the last time you had dental exam?: in [place]  Do you check your feet for cracks, sores, debris?: Yes  When was the last time you had podiatry or foot exam?: 2/8/21  Last flu shot?: n/a  Pneumonia shot?: No      The patient's history was reviewed and updated as appropriate: allergies, current medications  Intervention    Diabetes Overview :   Chacorta Johnson was instructed on basic concepts of diabetes, including identifying role of diabetes self management, basic pathophysiology and types of diabetes, A1c and blood sugar targets  Chacorta Johnson has good understanding of material covered  Taking Medications: Instructed patient on action, side effects, efficacy, prescribed dosage and appropriate timing and frequency of administration of her diabetes medication  Chacorta Johnson has good understanding of material covered  Monitoring Blood Sugars  Instructions for Meter Teaching- Explained to patient that she needs to call her doctor's office to let them know she is not monitoring her sugars, that she feels she needs a new meter  Goal Blood Sugars:   Premeal , even better <110  2hr after a meal <180, even better <140  A1C <7%, even better <6 5%  Hypoglycemia: Instructed patient on definition/risk of hypoglycemia, treatment, causes/symptoms, when to notify provider of lows, prevention of hypoglycemia and exercise precautions  Comments: Betty verbalizes understanding of hypoglycemia concepts      Physical Activity: Discussed benefits of physical activity to optimize blood glucose control, encouraged activity as patient is physically able   Always consult a physician prior to starting an exercise program   Comments: Teo Hurst understanding of benefits of physical activity  Diabetes Education Record  Rayshawn Beverly received the following handouts: Introductory packet of Diabetes Education including "my Plate" and other nutrition education materials, all in [place]  Patient response to instruction    Comprehensiongood  Motivationgood  Expected Compliancegood    Thank you for referring your patient to [facility], it was a pleasure working with them today  Please feel free to call with any questions or concerns at   [facility]   clinics at [address] [place] [address]  Problem List Items Addressed This Visit        Endocrine    Diabetes mellitus ([address] )                Reason for visit is   Chief Complaint   Patient presents with    Virtual Brief Visit        Encounter provider [address]  [facility] [address]    Recent Visits  Date Type Provider Dept   02/08/21 Telephone [facility]   02/08/21 Telephone [facility]   02/08/21 Office Visit Raza Avery MD  [facility]   02/04/21 Telephone [facility]   02/04/21 Telephone [employer] [facility] [address] recent visits within past 7 days and meeting all other requirements     Today's Visits  Date Type Provider Dept   02[facility]'s visits and meeting all other requirements     Future Appointments  No visits were found meeting these conditions  Showing future appointments within next 150 days and meeting all other requirements        After connecting through telephone and patient was informed that this is not a secure, HIPAA-compliant platform  She agrees to proceed  , the patient was identified by name and date of birth   Geetha Carlson was informed that this is a telemedicine visit and that the visit is being conducted through telephone and patient was informed that this is not a secure, HIPAA-compliant platform  She agrees to proceed     My office door was closed  No one else was in the room  She acknowledged consent and understanding of privacy and security of the platform  The patient has agreed to participate and understands she can discontinue the visit at any time  Patient is aware this is a billable service  Subjective    Jorje Brown is a 41 y o  female with type 2 diabetes  Martha Serum   HPI     Past Medical History:   Diagnosis Date    Diabetes mellitus ([address] )     Disease of thyroid gland     Hypertension        Past Surgical History:   Procedure Laterality Date    HYSTERECTOMY         Current Outpatient Medications   Medication Sig Dispense Refill    atorvastatin (LIPITOR) 40 mg tablet Take 1 tablet (40 mg total) by mouth daily 90 tablet 1    ciclopirox (PENLAC) 8 % solution Apply topically daily at bedtime 6 6 mL 0    furosemide (LASIX) 20 mg tablet Take 1 tablet (20 mg total) by mouth daily 15 tablet 0    gabapentin (NEURONTIN) 100 mg capsule Take 1 capsule (100 mg total) by mouth 3 (three) times a day 90 capsule 0    ketoconazole (NIZORAL) 2 % cream Apply topically daily 15 g 0    levothyroxine 112 mcg tablet Take 1 tablet (112 mcg total) by mouth daily in the early morning 30 tablet 0    methocarbamol (ROBAXIN) 500 mg tablet Take 1 tablet (500 mg total) by mouth daily 30 tablet 0    methocarbamol (ROBAXIN) 750 mg tablet Take 1 tablet (750 mg total) by mouth 2 (two) times a day as needed for muscle spasms 20 tablet 0    naproxen (NAPROSYN) 500 mg tablet Take 1 tablet (500 mg total) by mouth 2 (two) times a day as needed for mild pain or moderate pain 30 tablet 0    pramoxine-calamine (CALADRYL) 1-8 % lotion Apply topically 4 (four) times a day as needed for itching or irritation 177 mL 0    sitaGLIPtin-metFORMIN (JANUMET)  MG per tablet Take 2 tablets by mouth two times a day with meals 60 tablet 2    valACYclovir (VALTREX) 1,000 mg tablet Take 1 tablet (1,000 mg total) by mouth every 8 (eight) hours for 7 days 21 tablet 0     No current facility-administered medications for this visit  Allergies   Allergen Reactions    Penicillins Rash       Review of Systems    There were no vitals filed for this visit  I spent 30 minutes with patient today in which greater than 50% of the time was spent in counseling/coordination of care regarding type 2 diabetes  VIRTUAL VISIT DISCLAIMER    Myla Ho acknowledges that she has consented to an online visit or consultation  She understands that the online visit is based solely on information provided by her, and that, in the absence of a face-to-face physical evaluation by the physician, the diagnosis she receives is both limited and provisional in terms of accuracy and completeness  This is not intended to replace a full medical face-to-face evaluation by the physician  Myla Ho understands and accepts these terms

## 2021-02-09 NOTE — Clinical Note
Provided Virtual Telephone Visit in [facility]  Patient verbalized understanding, and seems willing to follow through with suggestions made  She states she needs a new meter, has not been checking her blood sugars  I advised her to call the doctor's office with this request for a new meter  She works 7pm - 7am, long work schedule, states she is fairly active at work since she does not have time for any exercise/ physical activity  Will provide patient with "at home" exercise guidelines, along with her packet of Diabetes Education in [facility]

## 2021-03-01 ENCOUNTER — CLINICAL SUPPORT (OUTPATIENT)
Dept: NUTRITION | Facility: HOSPITAL | Age: 46
End: 2021-03-01
Payer: COMMERCIAL

## 2021-03-01 VITALS — WEIGHT: 212 LBS | BODY MASS INDEX: 37.55 KG/M2

## 2021-03-01 DIAGNOSIS — E11.69 TYPE 2 DIABETES MELLITUS WITH OTHER SPECIFIED COMPLICATION, WITHOUT LONG-TERM CURRENT USE OF INSULIN (HCC): ICD-10-CM

## 2021-03-01 PROCEDURE — 97802 MEDICAL NUTRITION INDIV IN: CPT

## 2021-03-01 NOTE — PROGRESS NOTES
Initial Nutrition Assessment Form    Patient Name: Rashmi Kumar    YOB: 1975    Sex: Female     Assessment Date: 3/1/2021  Start Time: 1010 Stop Time: 0 Total Minutes: 61     Data:  Present at session: self   Parent/Patient Concerns: "I want to lose weight"   Medical Dx/Reason for Referral: DM   Past Medical History:   Diagnosis Date    Diabetes mellitus ([address] )     Disease of thyroid gland     Hypertension        Current Outpatient Medications   Medication Sig Dispense Refill    atorvastatin (LIPITOR) 40 mg tablet Take 1 tablet (40 mg total) by mouth daily 90 tablet 1    ciclopirox (PENLAC) 8 % solution Apply topically daily at bedtime 6 6 mL 0    furosemide (LASIX) 20 mg tablet Take 1 tablet (20 mg total) by mouth daily 15 tablet 0    gabapentin (NEURONTIN) 100 mg capsule Take 1 capsule (100 mg total) by mouth 3 (three) times a day 90 capsule 0    ketoconazole (NIZORAL) 2 % cream Apply topically daily 15 g 0    levothyroxine 112 mcg tablet Take 1 tablet (112 mcg total) by mouth daily in the early morning 30 tablet 0    methocarbamol (ROBAXIN) 500 mg tablet Take 1 tablet (500 mg total) by mouth daily 30 tablet 0    methocarbamol (ROBAXIN) 750 mg tablet Take 1 tablet (750 mg total) by mouth 2 (two) times a day as needed for muscle spasms 20 tablet 0    naproxen (NAPROSYN) 500 mg tablet Take 1 tablet (500 mg total) by mouth 2 (two) times a day as needed for mild pain or moderate pain 30 tablet 0    pramoxine-calamine (CALADRYL) 1-8 % lotion Apply topically 4 (four) times a day as needed for itching or irritation 177 mL 0    sitaGLIPtin-metFORMIN (JANUMET)  MG per tablet Take 2 tablets by mouth two times a day with meals 60 tablet 2    valACYclovir (VALTREX) 1,000 mg tablet Take 1 tablet (1,000 mg total) by mouth every 8 (eight) hours for 7 days 21 tablet 0     No current facility-administered medications for this visit           Additional Meds/Supplements: n/a   Special Learning Needs: n/a   Height: HC Readings from Last 3 Encounters:   No data found for [facility]       Weight: Wt Readings from Last 10 Encounters:   03/01/21 96 2 kg (212 lb)   02/08/21 96 6 kg (213 lb)   02/08/21 96 6 kg (213 lb)   01/26/21 95 kg (209 lb 7 oz)   01/11/21 95 3 kg (210 lb)   12/11/20 94 4 kg (208 lb 3 2 oz)     Estimated body mass index is 37 55 kg/m² as calculated from the following:    Height as of 2/8/21: 5' 3" (1 6 m)  Weight as of this encounter: 96 2 kg (212 lb)  Recent Weight Change: []Yes     [x]No  Amount:       Energy Needs: 2880kcals -1000 (for 2#/wk wt loss)= 1880kcals   Allergies   Allergen Reactions    Penicillins Rash    NKFA   Social History     Substance and Sexual Activity   Alcohol Use Yes    Frequency: Monthly or less    Drinks per session: 1 or 2    Binge frequency: Never    n/a   Social History     Tobacco Use   Smoking Status Never Smoker   Smokeless Tobacco Never Used    n/a   Who shops? patient and spouse   Who cooks? patient and spouse   Exercise:    Prior Counseling? [x]Yes     []No  When: Last month     Why: DM education        Diet Hx:20 mins break every 2 hours; drinking coffee; juice; flavored water  Breakfast:3am water only; 5am meat sticks and string cheese    7am breakfast cereal or pork s/w then 6-7 hours sleep        a m  Lunch:     p m           Dinner: rice and beans chicken pork salads or mashed potatoes   5-530  p m  before work [employer] fo the day         Snacks: AM - 1am  Fruit or yogurt snickers or m and m's gummy bears or doritos or potato chips  PM - 9-915 cheese ham and couple cookies or fruit or yogurt or cereal  HS -         Nutrition Diagnosis:   Altered Nutrition-Related Laboratory values  related to Kidney, liver, cardiac, endocrine, neurologic, and/or pulmonary dysfunction as evidenced by  Abnormal plasma glucose and/or HgbA1c levels       Medical Nutrition Therapy Intervention:  []Individualized Meal Plan []Understanding Lab Values   []Basic Pathophysiology of Disease []Food/Medication Interactions   []Food Diary [x]Exercise-activity 30 mins 3-4x/wk   [x]Lifestyle/Behavior Modification Techniques-adequate sleep 7-9 hours as able []Medication, Mechanism of Action   []Label Reading [x]Self Blood Glucose Monitoring-checks daily 140-200   [x]Weight/BMI Goals-wants to lose 40-50# [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-   Other Notes:  Bed at 8am wake up at 330-4 works 7p-7a; weighed 195# when she started the job 7 months ago Saadia Rosario is here with her  today is more concerned about losing weight, just starting working overnight shift 7 months ago and weight is slowly going up since then       Comprehension: []Excellent  []Very Good  [x]Good  []Fair   []Poor    Receptivity: []Excellent  []Very Good  [x]Good  []Fair   []Poor    Expected Compliance: []Excellent  []Very Good  [x]Good  []Fair   []Poor        Goals:  1  3 meals a day no skipping calories 1800 calories per day   2 30-60gms carbs per meal   3 4oz juice daily or eliminate completely       No follow-ups on file    Labs:  CMP  Lab Results   Component Value Date    K 4 2 12/22/2020     12/22/2020    CO2 28 12/22/2020    BUN 13 12/22/2020    CREATININE 0 62 12/22/2020    GLUF 143 (H) 12/22/2020    CALCIUM 9 3 12/22/2020    AST 15 12/22/2020    ALT 33 12/22/2020    ALKPHOS 80 12/22/2020    EGFR 110 12/22/2020       BMP  Lab Results   Component Value Date    CALCIUM 9 3 12/22/2020    K 4 2 12/22/2020    CO2 28 12/22/2020     12/22/2020    BUN 13 12/22/2020    CREATININE 0 62 12/22/2020       Lipids  No results found for: CHOL  Lab Results   Component Value Date    HDL 44 02/03/2021    HDL 47 12/22/2020     Lab Results   Component Value Date    LDLCALC 154 (H) 02/03/2021    LDLCALC 173 (H) 12/22/2020     Lab Results   Component Value Date    TRIG 282 (H) 02/03/2021    TRIG 259 (H) 12/22/2020     No results found for: CHOLHDL    Hemoglobin A1C  Lab Results   Component Value Date    HGBA1C 8 2 (A) 12/11/2020       Fasting Glucose  Lab Results   Component Value Date    GLUF 143 (H) 12/22/2020       Insulin     Thyroid  No results found for: TSH, J9JJBVD, O2ZGFJY, THYROIDAB    Hepatic Function Panel  Lab Results   Component Value Date    ALT 33 12/22/2020    AST 15 12/22/2020    ALKPHOS 80 12/22/2020       Celiac Disease Antibody Panel  No results found for: ENDOMYSIAL IGA, GLIADIN IGA, GLIADIN IGG, IGA, TISSUE TRANSGLUT AB, TTG IGA   Iron  No results found for: IRON, TIBC, FERRITIN    Vitamins  No results found for: VITAMIN B2   No results found for: NICOTINAMIDE, NICOTINIC ACID   No results found for: VITAMINB6  No results found for: [id number]  No results found for: VITB5  No results found for: M0AZVDXM  No results found for: THYROGLB  No results found for: VITAMIN K   No results found for: 25-HYDROXY VIT D   No components found for: VITAMINE     DSalmon MS RD LDN  [facility] Lance Pierson  [address] [place] [phone]

## 2021-03-15 ENCOUNTER — CONSULT (OUTPATIENT)
Dept: BARIATRICS | Facility: CLINIC | Age: 46
End: 2021-03-15
Payer: COMMERCIAL

## 2021-03-15 VITALS
HEIGHT: 63 IN | DIASTOLIC BLOOD PRESSURE: 90 MMHG | SYSTOLIC BLOOD PRESSURE: 114 MMHG | BODY MASS INDEX: 37.16 KG/M2 | WEIGHT: 209.7 LBS | TEMPERATURE: 99.5 F | HEART RATE: 97 BPM

## 2021-03-15 DIAGNOSIS — E78.5 HYPERLIPIDEMIA, UNSPECIFIED HYPERLIPIDEMIA TYPE: ICD-10-CM

## 2021-03-15 DIAGNOSIS — E11.69 TYPE 2 DIABETES MELLITUS WITH OTHER SPECIFIED COMPLICATION, WITHOUT LONG-TERM CURRENT USE OF INSULIN (HCC): ICD-10-CM

## 2021-03-15 DIAGNOSIS — K21.9 GASTROESOPHAGEAL REFLUX DISEASE WITHOUT ESOPHAGITIS: ICD-10-CM

## 2021-03-15 DIAGNOSIS — E03.9 HYPOTHYROIDISM, UNSPECIFIED TYPE: ICD-10-CM

## 2021-03-15 DIAGNOSIS — E66.9 OBESITY, CLASS II, BMI 35-39.9: Primary | ICD-10-CM

## 2021-03-15 PROCEDURE — 1036F TOBACCO NON-USER: CPT | Performed by: FAMILY MEDICINE

## 2021-03-15 PROCEDURE — 99243 OFF/OP CNSLTJ NEW/EST LOW 30: CPT | Performed by: FAMILY MEDICINE

## 2021-03-15 NOTE — PROGRESS NOTES
Assessment/Plan:        Diagnoses and all orders for this visit:    Obesity, Class II, BMI 35-39 9    BMI 37 0-37 9, adult  -     Ambulatory referral to Weight Management    Hyperlipidemia, unspecified hyperlipidemia type    Hypothyroidism, unspecified type    Type 2 diabetes mellitus with other specified complication, without long-term current use of insulin (HCC)    Gastroesophageal reflux disease without esophagitis      -Discussed options of HealthyCORE-Intensive Lifestyle Intervention Program, Very Low Calorie Diet-VLCD, Conservative Program, Mario-En-Y Gastric Bypass and Vertical Sleeve Gastrectomy and the role of weight loss medications   -Initial weight loss goal of 5-10% weight loss for improved health  -Screening labs- 12/2020  -Patient is interested in pursuing Mario-En-Y Gastric Bypass and Vertical Sleeve Gastrectomy     Pt is interested in surgery especially because she is concern of her uncontrolled DM2  I discussed with her option of starting a GLP1 agonist like Ozempic (once a week injection) or victoza (daily injection) but will defer to her PCP who treats her DM2  She is due for A1c in May with follow up with PCP  Her last A1c in December 2020 was 8 2 on janumet 50/500 bid  Goals:  Food log (ie ) www myfitnesspal com,sparkpeople  com,loseit com,calorieking  com,etc  baritastic  No sugary beverages  At least 64oz of water daily  Increase physical activity by 10 minutes daily  Gradually increase physical activity to a goal of 5 days per week for 30 minutes of MODERATE intensity PLUS 2 days per week of FULL BODY resistance training  Goal protein intake of 60-80 grams per day, 5-10 servings of fruits and vegetables per day and 8685-3351 calories per day,  grams of carbs a day (30 gram with meals, 15 grams w snacks)    She should continue to see RD for DM education         45 minute visit, >50% face-to-face time spent counseling patient on surgical and nonsurgical interventions for the treatment of excess weight  Discussed the advantages and long-term outcomes with regards to bariatric surgery  Discussed in detail nonsurgical options including intensive lifestyle intervention program, very low-calorie diet program and conservative program   Discussed the role of weight loss medications  Counseled patient on diet behavior and exercise modification for weight loss  Follow up in approximately 2 weeks with Bariatric Surgeon and Surgical   Subjective:   Chief Complaint   Patient presents with    Consult     mwm consult       Patient ID: Jorje Brown  is a 41 y o  female with excess weight/obesity here to pursue weight management  Past Medical History:   Diagnosis Date    Diabetes mellitus ([address] )     Disease of thyroid gland     Hypertension        HPI:  Obesity/Excess Weight:  Severity: class 2  Onset:  years    Modifiers: Diet and Exercise  Contributing factors: Poor Food Choices, Lack of knowledge of appropriate lifestyle changes and Insufficient time to make appropriate lifestyle changes  Associated symptoms: comorbid conditions and decreased self esteem    Goals: 160lbs  Hydration:  Alcohol: no  Smoking: no  Exercise: no  Sleep: 6-7 hrs  STOP bang: 3/8    Social:  Lives with   Works full time 7pm to 7am, packing in a factory      The following portions of the patient's history were reviewed and updated as appropriate: allergies, current medications, past family history, past medical history, past social history, past surgical history and problem list     Review of Systems   Constitutional: Negative for activity change and appetite change  Respiratory: Negative  Cardiovascular: Negative  Gastrointestinal: Negative  Genitourinary: Negative  Musculoskeletal: Negative for arthralgias  Skin: Negative for rash  Psychiatric/Behavioral: Negative          Objective:    /90 (BP Location: Right arm, Patient Position: Sitting, Cuff Size: Large)   Pulse 97   Temp 99 5 °F (37 5 °C)   Ht 5' 3" (1 6 m)   Wt 95 1 kg (209 lb 11 2 oz)   BMI 37 15 kg/m²     Physical Exam     Constitutional   General appearance: Abnormal   well developed and obese  Eyes No conjunctival pallor  Ears, Nose, Mouth, and Throat Oral mucosa moist    Pulmonary   Respiratory effort: No increased work of breathing or signs of respiratory distress  Auscultation of lungs: Clear to auscultation, equal breath sounds bilaterally, no wheezes, no rales, no rhonci  Cardiovascular   Auscultation of heart: Normal rate and rhythm, normal S1 and S2, without murmurs  Examination of extremities for edema and/or varicosities: Normal   no edema  Abdomen   Abdomen: Abnormal   The abdomen was obese  Bowel sounds were normal  The abdomen was soft and nontender     Musculoskeletal   Gait and station: Normal     Psychiatric   Orientation to person, place and time: Normal     Affect: appropriate

## 2021-03-16 ENCOUNTER — RA CDI HCC (OUTPATIENT)
Dept: OTHER | Facility: HOSPITAL | Age: 46
End: 2021-03-16

## 2021-03-16 NOTE — PROGRESS NOTES
[facility]  coding oppertunities             Chart reviewed, (number of) suggestions sent to provider: 1           E66 01: Morbid (severe) obesity due to excess calories ([facility] )     PROVIDER DID NOT RESPOND TO THE SUGGESTION

## 2021-03-22 ENCOUNTER — OFFICE VISIT (OUTPATIENT)
Dept: FAMILY MEDICINE CLINIC | Facility: CLINIC | Age: 46
End: 2021-03-22

## 2021-03-22 VITALS
SYSTOLIC BLOOD PRESSURE: 122 MMHG | DIASTOLIC BLOOD PRESSURE: 82 MMHG | TEMPERATURE: 97.6 F | WEIGHT: 211 LBS | BODY MASS INDEX: 37.39 KG/M2 | OXYGEN SATURATION: 98 % | HEIGHT: 63 IN | RESPIRATION RATE: 18 BRPM | HEART RATE: 98 BPM

## 2021-03-22 DIAGNOSIS — E11.9 TYPE 2 DIABETES MELLITUS WITHOUT COMPLICATION, WITHOUT LONG-TERM CURRENT USE OF INSULIN (HCC): Primary | ICD-10-CM

## 2021-03-22 DIAGNOSIS — B35.1 ONYCHOMYCOSIS: ICD-10-CM

## 2021-03-22 PROCEDURE — 3008F BODY MASS INDEX DOCD: CPT | Performed by: PODIATRIST

## 2021-03-22 PROCEDURE — 1036F TOBACCO NON-USER: CPT | Performed by: PODIATRIST

## 2021-03-22 PROCEDURE — 3008F BODY MASS INDEX DOCD: CPT | Performed by: FAMILY MEDICINE

## 2021-03-22 PROCEDURE — 99213 OFFICE O/P EST LOW 20 MIN: CPT | Performed by: PODIATRIST

## 2021-03-22 NOTE — PROGRESS NOTES
Podiatry Clinic Visit  Mozelle Nageotte 41 y o  female MRN: [id number]  Encounter: [id number]    Assessment/Plan        Diagnoses and all orders for this visit:    Type 2 diabetes mellitus without complication, without long-term current use of insulin ([address] )    Onychomycosis  -     Hepatic function panel; Future  -     ciclopirox (PENLAC) 8 % solution; Apply topically daily at bedtime         Plan:   Patient was seen and examined with all their questions and concerns addressed   Hepatic function Panel ordered prior to prescription of Lamisil   Continue ciclopirox   Patient was re-appointed for 3 months    - Dr Dahlia Samuels was available/present for entirety of patient encounter and present for all procedures  History of Present Illness     HPI: Mozelle Nageotte is a 41 y o  female who presents complaining of mycotic nails to b/l foot  Pt reports that she wound like a new prescription for penlac  Pt also reports that she wound like a oral medication for her mycotic nails  Pt reports that the itchiness to the bottom of her foot has resolved with the ketokonazol  Las A1c was 8 2% 12/11/20  Blood sugar this morning was 120 mg/dl  The patient has no further podiatric complaints at this time  Review of Systems   Constitutional: Negative  HENT: Negative  Eyes: Negative  Respiratory: Negative  Cardiovascular: Negative  Gastrointestinal: Negative  Musculoskeletal: negative  Skin: mycotic nails  Neurological: Negative          Historical Information   Past Medical History:   Diagnosis Date    Ankle swelling     Calf pain     with walking    Constipation     Diabetes mellitus ([address] )     Disease of thyroid gland     Double vision     GERD (gastroesophageal reflux disease)     H/O: hysterectomy     Headache     Hypertension     Joint pain     Numbness     Palpitations     Polycystic ovary syndrome     SOB (shortness of breath)      Past Surgical History:   Procedure Laterality Date    HYSTERECTOMY       Social History   Social History     Substance and Sexual Activity   Alcohol Use Yes    Frequency: Monthly or less    Drinks per session: 1 or 2    Binge frequency: Never     Social History     Substance and Sexual Activity   Drug Use Never     Social History     Tobacco Use   Smoking Status Never Smoker   Smokeless Tobacco Never Used     Family History:   Family History   Problem Relation Age of Onset    Hypertension Mother     Heart disease Mother     Diabetes Mother     Thyroid disease Mother     Thyroid disease Father     Diabetes Father     Hypertension Father     Hypertension Sister     Heart attack Sister     Diabetes Sister     Heart disease Sister     Hypertension Brother     Heart disease Brother     Heart attack Brother     Diabetes Brother        Meds/Allergies   (Not in a hospital admission)    Allergies   Allergen Reactions    Penicillins Rash       Objective     Current Vitals:   Blood Pressure: 122/82 (03/22/21 0835)  Pulse: 98 (03/22/21 0835)  Temperature: 97 6 °F (36 4 °C) (03/22/21 0835)  Temp Source: Temporal (03/22/21 0835)  Respirations: 18 (03/22/21 0835)  Height: 5' 3" (160 cm) (03/22/21 0835)  Weight - Scale: 95 7 kg (211 lb) (03/22/21 0835)  SpO2: 98 % (03/22/21 0835)        /82 (BP Location: Left arm, Patient Position: Sitting, Cuff Size: Large)   Pulse 98   Temp 97 6 °F (36 4 °C) (Temporal)   Resp 18   Ht 5' 3" (1 6 m)   Wt 95 7 kg (211 lb)   SpO2 98%   BMI 37 38 kg/m²       Lower Extremity Exam:    Foot Exam    Musculoskeletal:  MMT is 5/5 to all compartments of the LE, +0/4 edema B/L, Equinus is present  No pain with passive ROM of pedal joints  No tenderness over foot and ankle  Vascular:   DP pulses and PT pulses are present bilaterally  , CFT< 3sec to all digits  Pedal hair is Present  Pulse has regular rate and rhythm  Skin temperature warm to warm B/L  Dermatological:  No open Lesions   Skin of the LE is normal texture, temperature, turgor  Interdigital maceration is not present  No xerosis and itchiness to b/l foot  Discolored, thicken, with subungual debri nails x3       Neurologic:  Gross sensation is intact  Protective sensation is Intact  Sharp sensation is present

## 2021-03-24 ENCOUNTER — LAB (OUTPATIENT)
Dept: LAB | Facility: HOSPITAL | Age: 46
End: 2021-03-24
Payer: COMMERCIAL

## 2021-03-24 DIAGNOSIS — B35.1 ONYCHOMYCOSIS: ICD-10-CM

## 2021-03-24 LAB
ALBUMIN SERPL BCP-MCNC: 3.5 G/DL (ref 3.5–5)
ALP SERPL-CCNC: 94 U/L (ref 46–116)
ALT SERPL W P-5'-P-CCNC: 32 U/L (ref 12–78)
AST SERPL W P-5'-P-CCNC: 13 U/L (ref 5–45)
BILIRUB DIRECT SERPL-MCNC: 0.1 MG/DL (ref 0–0.2)
BILIRUB SERPL-MCNC: 0.36 MG/DL (ref 0.2–1)
PROT SERPL-MCNC: 7.6 G/DL (ref 6.4–8.2)

## 2021-03-24 PROCEDURE — 36415 COLL VENOUS BLD VENIPUNCTURE: CPT

## 2021-03-24 PROCEDURE — 80076 HEPATIC FUNCTION PANEL: CPT

## 2021-03-29 ENCOUNTER — TELEPHONE (OUTPATIENT)
Dept: INTERNAL MEDICINE CLINIC | Facility: CLINIC | Age: 46
End: 2021-03-29

## 2021-03-29 RX ORDER — TERBINAFINE HYDROCHLORIDE 250 MG/1
250 TABLET ORAL DAILY
Qty: 84 TABLET | Refills: 0 | Status: SHIPPED | OUTPATIENT
Start: 2021-03-29 | End: 2021-06-21

## 2021-03-29 NOTE — TELEPHONE ENCOUNTER
Called pt to review Hepatic function lab panel results  Informed the pt that Lamisil was prescribed

## 2021-05-04 ENCOUNTER — IMMUNIZATIONS (OUTPATIENT)
Dept: FAMILY MEDICINE CLINIC | Facility: HOSPITAL | Age: 46
End: 2021-05-04

## 2021-05-04 DIAGNOSIS — Z23 ENCOUNTER FOR IMMUNIZATION: Primary | ICD-10-CM

## 2021-05-04 PROCEDURE — 91301 SARS-COV-2 / COVID-19 MRNA VACCINE (MODERNA) 100 MCG: CPT

## 2021-05-04 PROCEDURE — 0011A SARS-COV-2 / COVID-19 MRNA VACCINE (MODERNA) 100 MCG: CPT

## 2021-05-12 ENCOUNTER — LAB (OUTPATIENT)
Dept: LAB | Facility: HOSPITAL | Age: 46
End: 2021-05-12
Payer: COMMERCIAL

## 2021-05-12 DIAGNOSIS — E11.69 TYPE 2 DIABETES MELLITUS WITH OTHER SPECIFIED COMPLICATION, WITHOUT LONG-TERM CURRENT USE OF INSULIN (HCC): ICD-10-CM

## 2021-05-12 LAB
CREAT UR-MCNC: 126 MG/DL
EST. AVERAGE GLUCOSE BLD GHB EST-MCNC: 197 MG/DL
HBA1C MFR BLD: 8.5 %
MICROALBUMIN UR-MCNC: 36.7 MG/L (ref 0–20)
MICROALBUMIN/CREAT 24H UR: 29 MG/G CREATININE (ref 0–30)

## 2021-05-12 PROCEDURE — 82043 UR ALBUMIN QUANTITATIVE: CPT | Performed by: FAMILY MEDICINE

## 2021-05-12 PROCEDURE — 3061F NEG MICROALBUMINURIA REV: CPT | Performed by: FAMILY MEDICINE

## 2021-05-12 PROCEDURE — 83036 HEMOGLOBIN GLYCOSYLATED A1C: CPT

## 2021-05-12 PROCEDURE — 36415 COLL VENOUS BLD VENIPUNCTURE: CPT

## 2021-05-12 PROCEDURE — 82570 ASSAY OF URINE CREATININE: CPT | Performed by: FAMILY MEDICINE

## 2021-05-12 PROCEDURE — 3052F HG A1C>EQUAL 8.0%<EQUAL 9.0%: CPT | Performed by: FAMILY MEDICINE

## 2021-05-13 ENCOUNTER — RA CDI HCC (OUTPATIENT)
Dept: OTHER | Facility: HOSPITAL | Age: 46
End: 2021-05-13

## 2021-05-13 NOTE — PROGRESS NOTES
[facility] [employer]  coding opportunities             Chart reviewed, (number of) suggestions sent to provider: 2           Patients insurance company: [employer] (Medicare Advantage and Commercial)     Visit status: Patient arrived for their scheduled appointment     Provider never responded to [employer]  coding request     [facility] [employer]  coding opportunities             Chart reviewed, (number of) suggestions sent to provider: 2           Patients insurance company: [employer] (Medicare Advantage and Commercial)           1) E66 01- Morbid (severe) obesity due to excess calories ([facility] )   2) Z68  35- Z68 --  - Body mass index (BMI), adult  (Pick one from the Z68 35 - X33 --)

## 2021-05-17 ENCOUNTER — CLINICAL SUPPORT (OUTPATIENT)
Dept: BARIATRICS | Facility: CLINIC | Age: 46
End: 2021-05-17

## 2021-05-17 VITALS
BODY MASS INDEX: 38.27 KG/M2 | DIASTOLIC BLOOD PRESSURE: 90 MMHG | HEIGHT: 63 IN | WEIGHT: 216 LBS | SYSTOLIC BLOOD PRESSURE: 140 MMHG | TEMPERATURE: 97.4 F | HEART RATE: 99 BPM

## 2021-05-17 DIAGNOSIS — E03.9 HYPOTHYROIDISM, UNSPECIFIED TYPE: ICD-10-CM

## 2021-05-17 DIAGNOSIS — K21.9 GASTROESOPHAGEAL REFLUX DISEASE WITHOUT ESOPHAGITIS: ICD-10-CM

## 2021-05-17 DIAGNOSIS — E11.69 TYPE 2 DIABETES MELLITUS WITH OTHER SPECIFIED COMPLICATION, WITHOUT LONG-TERM CURRENT USE OF INSULIN (HCC): ICD-10-CM

## 2021-05-17 DIAGNOSIS — E78.5 HYPERLIPIDEMIA, UNSPECIFIED HYPERLIPIDEMIA TYPE: ICD-10-CM

## 2021-05-17 DIAGNOSIS — Z01.818 PRE-OPERATIVE CLEARANCE: Primary | ICD-10-CM

## 2021-05-17 DIAGNOSIS — E66.9 OBESITY, CLASS II, BMI 35-39.9: ICD-10-CM

## 2021-05-17 DIAGNOSIS — E66.01 MORBID OBESITY (HCC): Primary | ICD-10-CM

## 2021-05-17 DIAGNOSIS — E66.9 OBESITY, CLASS II, BMI 35-39.9: Primary | ICD-10-CM

## 2021-05-17 PROCEDURE — RECHECK: Performed by: DIETITIAN, REGISTERED

## 2021-05-17 NOTE — PROGRESS NOTES
Bariatric Nutrition Assessment Note    Type of surgery    Preop  Surgery Date: TBD- patient has 6 month program requirement     Surgeon: Dr Cory Denise  41 y o   female     Wt with BMI of 25: 141 1#  Pre-Op Excess Wt: 75#  /90 (BP Location: Right arm, Patient Position: Sitting)   Pulse 99   Temp (!) 97 4 °F (36 3 °C) (Tympanic)   Ht 5' 3" (1 6 m)   Wt 98 kg (216 lb)   BMI 38 26 kg/m²     [place]- [place]  Jeor Equation:  1599 kcal   Estimated calories for weight loss 919-1419 kcal  ( 1-2# per wk wt loss - sedentary )  Estimated protein needs 64-77 grams (1 0-1 2 gms/kg IBW )   Estimated fluid needs 2245 ml (35 ml/kg IBW )  64 ounces     Weight History   Onset of Obesity: Adult- after hysterectomy and with thyroid isses   Family history of obesity: Yes- mother and brother   Wt Loss Attempts: Exercise  High Protein/Low CHO diets (Atkins, Union, etc )  OTC meds/supplements  Self Created Diets (Portion Control, Healthy Food Choices, etc )  Patient has tried the above for 6 months or more with insufficient weight loss or weight regain, which is why patient has requested to be evaluated for weight loss surgery today  Maximum Wt Lost: 50 pounds lost  With diet and execise       Review of History and Medications   Past Medical History:   Diagnosis Date    Ankle swelling     Calf pain     with walking    Constipation     Diabetes mellitus ([address] )     Disease of thyroid gland     Double vision     GERD (gastroesophageal reflux disease)     H/O: hysterectomy     Headache     Hypertension     Joint pain     Numbness     Palpitations     Polycystic ovary syndrome     SOB (shortness of breath)      Past Surgical History:   Procedure Laterality Date    HYSTERECTOMY       Social History     Socioeconomic History    Marital status: Single     Spouse name: None    Number of children: None    Years of education: None    Highest education level: None Occupational History    None   Social Needs    Financial resource strain: None    Food insecurity     Worry: None     Inability: None    Transportation needs     Medical: None     Non-medical: None   Tobacco Use    Smoking status: Never Smoker    Smokeless tobacco: Never Used   Substance and Sexual Activity    Alcohol use: Yes     Frequency: Monthly or less     Drinks per session: 1 or 2     Binge frequency: Never     Comment: rare    Drug use: Never    Sexual activity: None   Lifestyle    Physical activity     Days per week: None     Minutes per session: None    Stress: None   Relationships    Social connections     Talks on phone: None     Gets together: None     Attends Rastafari service: None     Active member of club or organization: None     Attends meetings of clubs or organizations: None     Relationship status: None    Intimate partner violence     Fear of current or ex partner: None     Emotionally abused: None     Physically abused: None     Forced sexual activity: None   Other Topics Concern    None   Social History Narrative    None       Current Outpatient Medications:     atorvastatin (LIPITOR) 40 mg tablet, Take 1 tablet (40 mg total) by mouth daily, Disp: 90 tablet, Rfl: 1    ciclopirox (PENLAC) 8 % solution, Apply topically daily at bedtime, Disp: 6 6 mL, Rfl: 5    furosemide (LASIX) 20 mg tablet, Take 1 tablet (20 mg total) by mouth daily, Disp: 15 tablet, Rfl: 0    ketoconazole (NIZORAL) 2 % cream, Apply topically daily, Disp: 15 g, Rfl: 0    levothyroxine 112 mcg tablet, Take 1 tablet (112 mcg total) by mouth daily in the early morning, Disp: 30 tablet, Rfl: 0    naproxen (NAPROSYN) 500 mg tablet, Take 1 tablet (500 mg total) by mouth 2 (two) times a day as needed for mild pain or moderate pain, Disp: 30 tablet, Rfl: 0    pramoxine-calamine (CALADRYL) 1-8 % lotion, Apply topically 4 (four) times a day as needed for itching or irritation, Disp: 177 mL, Rfl: 0   sitaGLIPtin-metFORMIN (JANUMET)  MG per tablet, Take 2 tablets by mouth two times a day with meals, Disp: 60 tablet, Rfl: 2    terbinafine (LamISIL) 250 mg tablet, Take 1 tablet (250 mg total) by mouth daily, Disp: 84 tablet, Rfl: 0    ciclopirox (PENLAC) 8 % solution, Apply topically daily at bedtime (Patient not taking: Reported on 5/17/2021), Disp: 6 6 mL, Rfl: 0    valACYclovir (VALTREX) 1,000 mg tablet, Take 1 tablet (1,000 mg total) by mouth every 8 (eight) hours for 7 days, Disp: 21 tablet, Rfl: 0  Food Intake and Lifestyle Assessment   Food Intake Assessment completed via usual diet recall  7 pm to 7 am - stops at diner after work and gets breakfast   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   Breakfast:  7 :30 am eggs pancakes [place], potatoes or toast   Goes to bed after dinner   Lunch : 5 p m - rice and beans ( [place] ) [employer] or [employer]   Dinner : gets lunch break - will eat left overs from dinner   Will purchase items from vending machine - sandwiches or candy   Beverage intake: water, sugar free beverages, juice and regular soda  Protein supplement: none currently   Estimated protein intake per day: 50-60 grams   Estimated fluid intake per day: 4 botltes per day of flavored water   2 bottles of seven up or 2 bottles of juice   Meals eaten away from home: 2 meals per day   Typical meal pattern: 3 meals per day and 2-3 snacks per day  Eating Behaviors: Consumption of high calorie/ high fat foods, Consumption of high calorie beverages, Large portion sizes, Frequent snacking/ grazing and Mindless eating  Food allergies or intolerances: mild lactose intolerance   Allergies   Allergen Reactions    Penicillins Rash     Cultural or Denominational considerations:      Physical Assessment  Physical Activity  Walking   Types of exercise: walks a great deal at work   Wear smart watch - generally 8000 or more steps at work   Current physical limitations: lower back pain     Lab Results   Component Value Date HGBA1C 8 5 (H) 05/12/2021       Psychosocial Assessment   Support systems: spouse  Socioeconomic factors:  in warehouse   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 , night shift, limited time for self care  Nutrition Diagnosis  Diagnosis: Overweight / Obesity (NC-3 3), Excessive energy intake (NI-1 5) and Inappropriate intake of carbohydrates (NI-5 8  3)  Related to: Food and nutrition-related knowledge deficit, Physical inactivity and Excessive energy intake  As Evidenced by: BMI >25, Expected anthropometric outcomes are not achieved, Excessive energy intake, Unintentional weight gain and A1c 8 5%      Nutrition Prescription: Recommend the following diet  Low fat, Low sugar, High protein and consistent carbohydrate intake     Interventions and Teaching   Discussed pre-op and post-op nutrition guidelines  Patient educated and handouts provided  Surgical changes to stomach / GI  Capacity of post-surgery stomach  Diet progression  Adequate hydration  Sugar and fat restriction to decrease "dumping syndrome"  Fat restriction to decrease steatorrhea  Weight loss plateaus/ possibility of weight regain  Exercise  Suggestions for pre-op diet  Nutrition considerations after surgery  Meal planning and preparation  Dietary and lifestyle changes  Possible problems with poor eating habits  Vitamin / Mineral supplementation of Multivitamin with minerals, Calcium, Vitamin B12, Iron, Fat Soluble vitamins and Vitamin D  Patient is not currently pregnant and doesn't desire to become pregnant a minimum of one year post-op- patient had  Hysterectomy     Education provided to: patient    Barriers to learning: Language -  provided interpretation services     Readiness to change: preparation    Prior research on procedure: discussed with provider and friends or family    Comprehension: verbalizes understanding     Expected Compliance: good  Recommendations  Pt is an appropriate candidate for surgery   Yes    Evaluation / Monitoring  Dietitian to Monitor: Eating pattern as discussed Tolerance of nutrition prescription Body weight Lab values Physical activity Bowel pattern    Goals  Eliminate sugar sweetened beverages, Complete lession plans 1-6, Eat 3 meals per day, Eliminate mindless snacking and track steps - goal of 8000 or more steps per day    Switch to all diet beverages ( list provided )   Cook at least one day per week ( order groceries on line and  on way home from work ) to decrease eating out daily   Take 2000 IU of vitamin D3 per day       Time Spent:   1 Hour

## 2021-05-17 NOTE — PROGRESS NOTES
Bariatric Behavioral Health Evaluation    Used [employer]  Gerber Reaves #[id number]    Presenting Problem patient here to improve health, treat medical issues, reduce chronic pain and prevent family diease  Is the patient seeking Bariatric Surgery Eval? Yes  If yes how long have you researched this surgery option  Realizes Post- Op Requirements? Yes     Pre-morbid level of function and history of present illness: patient has medical issues  Psychiatric/Psychological Treatment Diagnosis: patient denies Mental Health diagnosis and treatment  Outpatient Counselor No     Psychiatrist No     Have you had Inpatient Treatment? No    Family Constellation (include relationship with each and Psych/Med HX)    Mother  obesity and Siblings  obesity    Domestic Violence No    Additional comments/stressors related to family/relationships/peer support  Patient works 7pm-7am every other weekend and during the week  A  in the [employer]  Sleeps 8a-4p  Physical/Psychological Assessment:     Appearance: appropriate  Sociability: friendly  Affect: appropriate  Mood: calm  Thought Process: coherent  Speech: normal  Content: no impairment  Orientation: person  Yes , place  Yes , time  Yes , normal attention span  Yes , normal memory  Yes   and normal judgement  Yes   Insight: emotional  fair    Risk Assessment:     none    Recommendations: Recommended for surgery  yes    Risk of Harm to Self or Others: none reported  Observation:     Interviews this interview only  Access to weapons no     Based on the previous information, the client presents the following risk of harm to self or others: low     Note Patient denies Mental Health diagnosis and treatment  Patient educated regarding the impact of nicotine and alcohol on the post surgery bariatric patient  Patient has a positive family history of obesity   Patient meets criteria for surgery at this program and is referred to the physician  Bariatric Surgery Education List provided in [facility]  BARIATRIC SURGERY EDUCATION CHECKLIST    I have received education related to my bariatric surgery process and understand:    Patients may be required to complete a psychiatric evaluation and receive clearance for surgery from their psychiatrist     Patients who undergo weight loss surgery are at higher risk of increased mental health concerns and suicide attempts  Patients may be required to complete a full substance abuse evaluation and then complete all treatment recommendations prior to surgery  If diagnosis of abuse/dependence results, patient may be required to remain sober for one (1) year before having bariatric surgery  Patients on psychiatric medications should check with their provider to discuss psychiatric medications and the changes in absorption  Patient should discuss all time release medications with provider and take all medications as prescribed  The recommendation is that there is no use of  any tobacco products, Hookah or  vapes for the bariatric post-operation patient  Bariatric surgery patients should not consume alcohol as a post-operative patient as it may increase risk of numerous health conditions including but not limited to alcohol abuse and ulcers  There is a possibility of weight regain if patient does not follow all program guidelines and recommendations  Bariatric surgery patients should exercise thirty (30) to sixty (60) minutes per day to maintain post-surgical weight loss  Research indicates that bariatric patients are more successful when they see a therapist for up to two (2) years post-op  Patients will follow all medical and dietary recommendations provided  Patient will keep all scheduled appointments and follow up with their physician for a minimum of five (5) years  Patient will take all vitamins as recommended  Post-operative vitamins are life-long      Patient reviewed Bariatric Surgery Education Checklist and agrees they have received education on these issues

## 2021-05-17 NOTE — PATIENT INSTRUCTIONS
Goals  1  Take 2000 IU of vitamin D2   2  Switch to diet beverages   3    Cook at least one day per week

## 2021-05-19 ENCOUNTER — OFFICE VISIT (OUTPATIENT)
Dept: FAMILY MEDICINE CLINIC | Facility: CLINIC | Age: 46
End: 2021-05-19

## 2021-05-19 VITALS
OXYGEN SATURATION: 98 % | BODY MASS INDEX: 38.15 KG/M2 | HEART RATE: 88 BPM | WEIGHT: 215.3 LBS | RESPIRATION RATE: 18 BRPM | DIASTOLIC BLOOD PRESSURE: 78 MMHG | TEMPERATURE: 97.4 F | SYSTOLIC BLOOD PRESSURE: 114 MMHG | HEIGHT: 63 IN

## 2021-05-19 DIAGNOSIS — E78.5 HYPERLIPIDEMIA, UNSPECIFIED HYPERLIPIDEMIA TYPE: ICD-10-CM

## 2021-05-19 DIAGNOSIS — E03.9 HYPOTHYROIDISM, UNSPECIFIED TYPE: ICD-10-CM

## 2021-05-19 DIAGNOSIS — E11.69 TYPE 2 DIABETES MELLITUS WITH OTHER SPECIFIED COMPLICATION, WITHOUT LONG-TERM CURRENT USE OF INSULIN (HCC): Primary | ICD-10-CM

## 2021-05-19 PROCEDURE — 1036F TOBACCO NON-USER: CPT | Performed by: FAMILY MEDICINE

## 2021-05-19 PROCEDURE — 99213 OFFICE O/P EST LOW 20 MIN: CPT | Performed by: FAMILY MEDICINE

## 2021-05-19 PROCEDURE — 3008F BODY MASS INDEX DOCD: CPT | Performed by: FAMILY MEDICINE

## 2021-05-19 RX ORDER — DULAGLUTIDE 0.75 MG/.5ML
0.75 INJECTION, SOLUTION SUBCUTANEOUS WEEKLY
Qty: 0.5 ML | Refills: 2 | Status: SHIPPED | OUTPATIENT
Start: 2021-05-19 | End: 2021-08-12

## 2021-05-19 RX ORDER — LEVOTHYROXINE SODIUM 112 UG/1
112 TABLET ORAL
Qty: 30 TABLET | Refills: 0 | Status: SHIPPED | OUTPATIENT
Start: 2021-05-19 | End: 2021-06-11

## 2021-05-19 NOTE — ASSESSMENT & PLAN NOTE
-TSH on 12/2020 within normal limits  -currently on: levothyroxine 112 mcg  -under more stressors at work, noticed more recent hair loss  -works overnight shifts as well, changing schedule to take medications when she wakes up from nap  -noticed some difficulty swallowing  -thyroid palpation on physical exam today normal  -discussed with patient that will order thyroid U/S to rule out any nodes that could not be easily identified on palpation  -follow up TSH levels to continue to monitor and adjust medication as warranted  -discussed taking the medication without foods, preferably upon waking up depending on her sleep schedule to avoid potential interaction without foods

## 2021-05-19 NOTE — ASSESSMENT & PLAN NOTE
Lab Results   Component Value Date    HGBA1C 8 5 (H) 05/12/2021     -increased hba1c from prior  -taking janumet  mg BID  -under more stressors at work, has had some dietary indiscretions due to changes in work shift at night and recent weight gain  -evaluated with bariatrics for potential bariatric surgery, just found out insurance was not able to cover for it  -discussed with patient potential options for diabetes medication optimization, including adding oral medications vs weekly injections vs Insulin  -patient expresses would like to try weekly injections;  Not want to have Insulin at this time  -will start trulicity weekly injection and continue with Janumet BID  -discussed potential side effects of medication, if any side effects to report back  -will follow up in next visit to continue to monitor and adjust medications as warranted   -discussed also importance of diabetic diet and weight loss since it would also help in having better diabetes control

## 2021-05-19 NOTE — PROGRESS NOTES
Assessment/Plan:    Hypothyroidism  -TSH on 12/2020 within normal limits  -currently on: levothyroxine 112 mcg  -under more stressors at work, noticed more recent hair loss  -works overnight shifts as well, changing schedule to take medications when she wakes up from nap  -noticed some difficulty swallowing  -thyroid palpation on physical exam today normal  -discussed with patient that will order thyroid U/S to rule out any nodes that could not be easily identified on palpation  -follow up TSH levels to continue to monitor and adjust medication as warranted  -discussed taking the medication without foods, preferably upon waking up depending on her sleep schedule to avoid potential interaction without foods    Diabetes mellitus ([facility] )    Lab Results   Component Value Date    HGBA1C 8 5 (H) 05/12/2021     -increased hba1c from prior  -taking janumet  mg BID  -under more stressors at work, has had some dietary indiscretions due to changes in work shift at night and recent weight gain  -evaluated with bariatrics for potential bariatric surgery, just found out insurance was not able to cover for it  -discussed with patient potential options for diabetes medication optimization, including adding oral medications vs weekly injections vs Insulin  -patient expresses would like to try weekly injections;  Not want to have Insulin at this time  -will start trulicity weekly injection and continue with Janumet BID  -discussed potential side effects of medication, if any side effects to report back  -will follow up in next visit to continue to monitor and adjust medications as warranted   -discussed also importance of diabetic diet and weight loss since it would also help in having better diabetes control     Hyperlipidemia  -on atorvastatin 40 mg daily  -lipid panel for next visit        Diagnoses and all orders for this visit:    Type 2 diabetes mellitus with other specified complication, without long-term current use of insulin (HCC)  -     Dulaglutide (Trulicity) 3 20 RR/4 9GU SOPN; Inject 0 5 mL (0 75 mg total) under the skin once a week  -     sitaGLIPtin-metFORMIN (JANUMET)  MG per tablet; Take 2 tablets by mouth two times a day with meals  -     HEMOGLOBIN A1C W/ EAG ESTIMATION; Future    Hypothyroidism, unspecified type  -     levothyroxine 112 mcg tablet; Take 1 tablet (112 mcg total) by mouth daily in the early morning  -     TSH, 3rd generation with Free T4 reflex; Future  -     US thyroid; Future    Hyperlipidemia, unspecified hyperlipidemia type  -     Lipid panel; Future          Subjective:      Patient ID: Kylah Oseguera is a 41 y o  female  Case of 40 y/o female who came today for follow up  She indicates to have noticed more weight gain and that her blood sugars have been more uncontrolled, ranging sometimes from 140-200's  Has had new position at work, under more stress and working night shifts which have changed her sleep schedule as well  She also indicates to have noticed more hair loss and some difficulty swallowing sometimes  No neck pain reported  The following portions of the patient's history were reviewed and updated as appropriate: allergies, current medications, past family history, past medical history, past social history, past surgical history and problem list     Review of Systems   Constitutional: Negative for fever  HENT: Positive for trouble swallowing  Eyes: Negative for visual disturbance  Respiratory: Negative for cough, shortness of breath and wheezing  Cardiovascular: Negative for chest pain, palpitations and leg swelling  Gastrointestinal: Negative for abdominal pain, constipation, diarrhea, nausea and vomiting  Endocrine:        Hair loss     Musculoskeletal: Negative for back pain  Skin: Negative for color change, pallor, rash and wound  Psychiatric/Behavioral: The patient is not nervous/anxious            Objective:      /78 (BP Location: Left arm, Patient Position: Sitting, Cuff Size: Standard)   Pulse 88   Temp (!) 97 4 °F (36 3 °C) (Temporal)   Resp 18   Ht 5' 3" (1 6 m)   Wt 97 7 kg (215 lb 4 8 oz)   SpO2 98%   Breastfeeding No   BMI 38 14 kg/m²          Physical Exam  Vitals signs reviewed  Constitutional:       General: She is not in acute distress  Appearance: Normal appearance  She is not ill-appearing, toxic-appearing or diaphoretic  HENT:      Mouth/Throat:      Comments: Thyroid normal upon palpation  Eyes:      Extraocular Movements: Extraocular movements intact  Cardiovascular:      Rate and Rhythm: Normal rate and regular rhythm  Pulses: Normal pulses  Heart sounds: Normal heart sounds  No murmur  Pulmonary:      Effort: Pulmonary effort is normal  No respiratory distress  Breath sounds: Normal breath sounds  No wheezing  Abdominal:      General: Abdomen is flat  Bowel sounds are normal  There is no distension  Palpations: Abdomen is soft  Tenderness: There is no abdominal tenderness  Musculoskeletal: Normal range of motion  General: No swelling, tenderness, deformity or signs of injury  Right lower leg: No edema  Left lower leg: No edema  Skin:     General: Skin is warm  Coloration: Skin is not jaundiced or pale  Findings: No bruising, erythema, lesion or rash  Neurological:      General: No focal deficit present  Mental Status: She is alert and oriented to person, place, and time  Mental status is at baseline     Psychiatric:         Mood and Affect: Mood normal

## 2021-06-03 ENCOUNTER — IMMUNIZATIONS (OUTPATIENT)
Dept: FAMILY MEDICINE CLINIC | Facility: HOSPITAL | Age: 46
End: 2021-06-03

## 2021-06-03 DIAGNOSIS — Z23 ENCOUNTER FOR IMMUNIZATION: Primary | ICD-10-CM

## 2021-06-03 PROCEDURE — 91301 SARS-COV-2 / COVID-19 MRNA VACCINE (MODERNA) 100 MCG: CPT

## 2021-06-03 PROCEDURE — 0012A SARS-COV-2 / COVID-19 MRNA VACCINE (MODERNA) 100 MCG: CPT

## 2021-06-11 ENCOUNTER — TELEPHONE (OUTPATIENT)
Dept: FAMILY MEDICINE CLINIC | Facility: CLINIC | Age: 46
End: 2021-06-11

## 2021-06-11 DIAGNOSIS — E03.9 HYPOTHYROIDISM, UNSPECIFIED TYPE: ICD-10-CM

## 2021-06-11 RX ORDER — LEVOTHYROXINE SODIUM 112 UG/1
TABLET ORAL
Qty: 30 TABLET | Refills: 2 | Status: SHIPPED | OUTPATIENT
Start: 2021-06-11 | End: 2021-06-23 | Stop reason: SDUPTHER

## 2021-06-23 DIAGNOSIS — E03.9 HYPOTHYROIDISM, UNSPECIFIED TYPE: ICD-10-CM

## 2021-06-24 RX ORDER — LEVOTHYROXINE SODIUM 112 UG/1
112 TABLET ORAL DAILY
Qty: 30 TABLET | Refills: 2 | Status: SHIPPED | OUTPATIENT
Start: 2021-06-24 | End: 2021-09-07 | Stop reason: ALTCHOICE

## 2021-08-30 ENCOUNTER — APPOINTMENT (OUTPATIENT)
Dept: LAB | Facility: HOSPITAL | Age: 46
End: 2021-08-30
Attending: SURGERY
Payer: COMMERCIAL

## 2021-08-30 DIAGNOSIS — Z01.818 PRE-OPERATIVE CLEARANCE: ICD-10-CM

## 2021-08-30 DIAGNOSIS — E11.69 TYPE 2 DIABETES MELLITUS WITH OTHER SPECIFIED COMPLICATION, WITHOUT LONG-TERM CURRENT USE OF INSULIN (HCC): ICD-10-CM

## 2021-08-30 DIAGNOSIS — E66.9 OBESITY, CLASS II, BMI 35-39.9: ICD-10-CM

## 2021-08-30 DIAGNOSIS — E78.5 HYPERLIPIDEMIA, UNSPECIFIED HYPERLIPIDEMIA TYPE: ICD-10-CM

## 2021-08-30 DIAGNOSIS — E03.9 HYPOTHYROIDISM, UNSPECIFIED TYPE: ICD-10-CM

## 2021-08-30 DIAGNOSIS — K21.9 GASTROESOPHAGEAL REFLUX DISEASE WITHOUT ESOPHAGITIS: ICD-10-CM

## 2021-08-30 LAB
ALBUMIN SERPL BCP-MCNC: 3.4 G/DL (ref 3.5–5)
ALP SERPL-CCNC: 75 U/L (ref 46–116)
ALT SERPL W P-5'-P-CCNC: 26 U/L (ref 12–78)
ANION GAP SERPL CALCULATED.3IONS-SCNC: 8 MMOL/L (ref 4–13)
AST SERPL W P-5'-P-CCNC: 14 U/L (ref 5–45)
BILIRUB SERPL-MCNC: 0.1 MG/DL (ref 0.2–1)
BUN SERPL-MCNC: 13 MG/DL (ref 5–25)
CALCIUM ALBUM COR SERPL-MCNC: 9.4 MG/DL (ref 8.3–10.1)
CALCIUM SERPL-MCNC: 8.9 MG/DL (ref 8.3–10.1)
CHLORIDE SERPL-SCNC: 103 MMOL/L (ref 100–108)
CHOLEST SERPL-MCNC: 235 MG/DL (ref 50–200)
CO2 SERPL-SCNC: 26 MMOL/L (ref 21–32)
CREAT SERPL-MCNC: 0.66 MG/DL (ref 0.6–1.3)
ERYTHROCYTE [DISTWIDTH] IN BLOOD BY AUTOMATED COUNT: 13.2 % (ref 11.6–15.1)
EST. AVERAGE GLUCOSE BLD GHB EST-MCNC: 148 MG/DL
GFR SERPL CREATININE-BSD FRML MDRD: 108 ML/MIN/1.73SQ M
GLUCOSE P FAST SERPL-MCNC: 159 MG/DL (ref 65–99)
HBA1C MFR BLD: 6.8 %
HCT VFR BLD AUTO: 39.3 % (ref 34.8–46.1)
HDLC SERPL-MCNC: 34 MG/DL
HGB BLD-MCNC: 13.3 G/DL (ref 11.5–15.4)
MCH RBC QN AUTO: 30.7 PG (ref 26.8–34.3)
MCHC RBC AUTO-ENTMCNC: 33.8 G/DL (ref 31.4–37.4)
MCV RBC AUTO: 91 FL (ref 82–98)
NONHDLC SERPL-MCNC: 201 MG/DL
PLATELET # BLD AUTO: 285 THOUSANDS/UL (ref 149–390)
PMV BLD AUTO: 10.6 FL (ref 8.9–12.7)
POTASSIUM SERPL-SCNC: 4 MMOL/L (ref 3.5–5.3)
PROT SERPL-MCNC: 7.4 G/DL (ref 6.4–8.2)
RBC # BLD AUTO: 4.33 MILLION/UL (ref 3.81–5.12)
SODIUM SERPL-SCNC: 137 MMOL/L (ref 136–145)
T4 FREE SERPL-MCNC: 0.74 NG/DL (ref 0.76–1.46)
TRIGL SERPL-MCNC: 434 MG/DL
TSH SERPL DL<=0.05 MIU/L-ACNC: 8.08 UIU/ML (ref 0.36–3.74)
WBC # BLD AUTO: 9.84 THOUSAND/UL (ref 4.31–10.16)

## 2021-08-30 PROCEDURE — 36415 COLL VENOUS BLD VENIPUNCTURE: CPT

## 2021-08-30 PROCEDURE — 80053 COMPREHEN METABOLIC PANEL: CPT

## 2021-08-30 PROCEDURE — 83036 HEMOGLOBIN GLYCOSYLATED A1C: CPT

## 2021-08-30 PROCEDURE — 84439 ASSAY OF FREE THYROXINE: CPT

## 2021-08-30 PROCEDURE — 80061 LIPID PANEL: CPT

## 2021-08-30 PROCEDURE — 84443 ASSAY THYROID STIM HORMONE: CPT

## 2021-08-30 PROCEDURE — 85027 COMPLETE CBC AUTOMATED: CPT

## 2021-09-02 ENCOUNTER — HOSPITAL ENCOUNTER (EMERGENCY)
Facility: HOSPITAL | Age: 46
Discharge: HOME | End: 2021-09-02
Attending: EMERGENCY MEDICINE | Admitting: EMERGENCY MEDICINE
Payer: COMMERCIAL

## 2021-09-02 ENCOUNTER — APPOINTMENT (EMERGENCY)
Dept: RADIOLOGY | Facility: HOSPITAL | Age: 46
End: 2021-09-02
Payer: COMMERCIAL

## 2021-09-02 ENCOUNTER — RA CDI HCC (OUTPATIENT)
Dept: OTHER | Facility: HOSPITAL | Age: 46
End: 2021-09-02

## 2021-09-02 VITALS
RESPIRATION RATE: 18 BRPM | BODY MASS INDEX: 38.08 KG/M2 | TEMPERATURE: 99 F | SYSTOLIC BLOOD PRESSURE: 122 MMHG | HEART RATE: 90 BPM | DIASTOLIC BLOOD PRESSURE: 72 MMHG | WEIGHT: 214.95 LBS | OXYGEN SATURATION: 96 %

## 2021-09-02 DIAGNOSIS — J18.9 PNEUMONIA: Primary | ICD-10-CM

## 2021-09-02 LAB
ALBUMIN SERPL BCP-MCNC: 3.6 G/DL (ref 3.5–5)
ALP SERPL-CCNC: 94 U/L (ref 46–116)
ALT SERPL W P-5'-P-CCNC: 25 U/L (ref 12–78)
ANION GAP SERPL CALCULATED.3IONS-SCNC: 10 MMOL/L (ref 4–13)
AST SERPL W P-5'-P-CCNC: 15 U/L (ref 5–45)
ATRIAL RATE: 90 BPM
BASOPHILS # BLD AUTO: 0.02 THOUSANDS/ΜL (ref 0–0.1)
BASOPHILS NFR BLD AUTO: 0 % (ref 0–1)
BILIRUB SERPL-MCNC: 0.42 MG/DL (ref 0.2–1)
BUN SERPL-MCNC: 8 MG/DL (ref 5–25)
CALCIUM SERPL-MCNC: 8.6 MG/DL (ref 8.3–10.1)
CHLORIDE SERPL-SCNC: 102 MMOL/L (ref 100–108)
CO2 SERPL-SCNC: 26 MMOL/L (ref 21–32)
CREAT SERPL-MCNC: 0.74 MG/DL (ref 0.6–1.3)
EOSINOPHIL # BLD AUTO: 0.05 THOUSAND/ΜL (ref 0–0.61)
EOSINOPHIL NFR BLD AUTO: 1 % (ref 0–6)
ERYTHROCYTE [DISTWIDTH] IN BLOOD BY AUTOMATED COUNT: 13.3 % (ref 11.6–15.1)
FLUAV RNA RESP QL NAA+PROBE: NEGATIVE
FLUBV RNA RESP QL NAA+PROBE: NEGATIVE
GFR SERPL CREATININE-BSD FRML MDRD: 99 ML/MIN/1.73SQ M
GLUCOSE SERPL-MCNC: 133 MG/DL (ref 65–140)
HCT VFR BLD AUTO: 37.6 % (ref 34.8–46.1)
HGB BLD-MCNC: 13.2 G/DL (ref 11.5–15.4)
IMM GRANULOCYTES # BLD AUTO: 0.04 THOUSAND/UL (ref 0–0.2)
IMM GRANULOCYTES NFR BLD AUTO: 0 % (ref 0–2)
LYMPHOCYTES # BLD AUTO: 1.36 THOUSANDS/ΜL (ref 0.6–4.47)
LYMPHOCYTES NFR BLD AUTO: 13 % (ref 14–44)
MCH RBC QN AUTO: 31.4 PG (ref 26.8–34.3)
MCHC RBC AUTO-ENTMCNC: 35.1 G/DL (ref 31.4–37.4)
MCV RBC AUTO: 90 FL (ref 82–98)
MONOCYTES # BLD AUTO: 0.72 THOUSAND/ΜL (ref 0.17–1.22)
MONOCYTES NFR BLD AUTO: 7 % (ref 4–12)
NEUTROPHILS # BLD AUTO: 8.01 THOUSANDS/ΜL (ref 1.85–7.62)
NEUTS SEG NFR BLD AUTO: 79 % (ref 43–75)
NRBC BLD AUTO-RTO: 0 /100 WBCS
P AXIS: 46 DEGREES
PLATELET # BLD AUTO: 281 THOUSANDS/UL (ref 149–390)
PMV BLD AUTO: 10.7 FL (ref 8.9–12.7)
POTASSIUM SERPL-SCNC: 3.8 MMOL/L (ref 3.5–5.3)
PR INTERVAL: 126 MS
PROT SERPL-MCNC: 7.9 G/DL (ref 6.4–8.2)
QRS AXIS: 55 DEGREES
QRSD INTERVAL: 98 MS
QT INTERVAL: 374 MS
QTC INTERVAL: 457 MS
RBC # BLD AUTO: 4.2 MILLION/UL (ref 3.81–5.12)
RSV RNA RESP QL NAA+PROBE: NEGATIVE
SARS-COV-2 RNA RESP QL NAA+PROBE: NEGATIVE
SODIUM SERPL-SCNC: 138 MMOL/L (ref 136–145)
T WAVE AXIS: 45 DEGREES
TROPONIN I SERPL-MCNC: <0.02 NG/ML
VENTRICULAR RATE: 90 BPM
WBC # BLD AUTO: 10.2 THOUSAND/UL (ref 4.31–10.16)

## 2021-09-02 PROCEDURE — 36415 COLL VENOUS BLD VENIPUNCTURE: CPT | Performed by: PHYSICIAN ASSISTANT

## 2021-09-02 PROCEDURE — 96361 HYDRATE IV INFUSION ADD-ON: CPT

## 2021-09-02 PROCEDURE — 71045 X-RAY EXAM CHEST 1 VIEW: CPT

## 2021-09-02 PROCEDURE — 96374 THER/PROPH/DIAG INJ IV PUSH: CPT

## 2021-09-02 PROCEDURE — 93010 ELECTROCARDIOGRAM REPORT: CPT | Performed by: INTERNAL MEDICINE

## 2021-09-02 PROCEDURE — 99284 EMERGENCY DEPT VISIT MOD MDM: CPT

## 2021-09-02 PROCEDURE — 84484 ASSAY OF TROPONIN QUANT: CPT | Performed by: PHYSICIAN ASSISTANT

## 2021-09-02 PROCEDURE — 80053 COMPREHEN METABOLIC PANEL: CPT | Performed by: PHYSICIAN ASSISTANT

## 2021-09-02 PROCEDURE — 0241U HB NFCT DS VIR RESP RNA 4 TRGT: CPT | Performed by: PHYSICIAN ASSISTANT

## 2021-09-02 PROCEDURE — 99284 EMERGENCY DEPT VISIT MOD MDM: CPT | Performed by: PHYSICIAN ASSISTANT

## 2021-09-02 PROCEDURE — 93005 ELECTROCARDIOGRAM TRACING: CPT

## 2021-09-02 PROCEDURE — 85025 COMPLETE CBC W/AUTO DIFF WBC: CPT | Performed by: PHYSICIAN ASSISTANT

## 2021-09-02 RX ORDER — AZITHROMYCIN 250 MG/1
TABLET, FILM COATED ORAL
Qty: 6 TABLET | Refills: 0 | Status: SHIPPED | OUTPATIENT
Start: 2021-09-02 | End: 2021-09-06

## 2021-09-02 RX ORDER — AZITHROMYCIN 250 MG/1
500 TABLET, FILM COATED ORAL ONCE
Status: COMPLETED | OUTPATIENT
Start: 2021-09-02 | End: 2021-09-02

## 2021-09-02 RX ORDER — KETOROLAC TROMETHAMINE 30 MG/ML
15 INJECTION, SOLUTION INTRAMUSCULAR; INTRAVENOUS ONCE
Status: COMPLETED | OUTPATIENT
Start: 2021-09-02 | End: 2021-09-02

## 2021-09-02 RX ADMIN — SODIUM CHLORIDE 500 ML: 0.9 INJECTION, SOLUTION INTRAVENOUS at 19:22

## 2021-09-02 RX ADMIN — AZITHROMYCIN MONOHYDRATE 500 MG: 250 TABLET ORAL at 20:58

## 2021-09-02 RX ADMIN — KETOROLAC TROMETHAMINE 15 MG: 30 INJECTION, SOLUTION INTRAMUSCULAR; INTRAVENOUS at 19:23

## 2021-09-02 NOTE — PROGRESS NOTES
Appt date : 09/09/2021    E66 01: Morbid (severe) obesity due to excess calories ([facility] ) - please review if this is correct and assess and document if applicable       Per CMS/ICD 10 coding guidelines, to be used when BMI > 35 & <40 with one or more comorbidity (DM, HTN, or BG)    [address]  coding opportunities             Chart Reviewed * (Number of) Inbasket suggestions sent to Provider: 1                  Patients insurance company: [employer] (Medicare Advantage and Commercial)             [facility] [address]  coding opportunities             Chart Reviewed * (Number of) Inbasket suggestions sent to Provider: 1               Number of suggestions NOT actually used: 1     Patients insurance company: [employer] (Pro[employer])     Visit status: Patient arrived for their scheduled appointment     Provider never responded to [facility]  coding request

## 2021-09-02 NOTE — Clinical Note
Benita Carlson was seen and treated in our emergency department on 9/2/2021  Diagnosis:     Trino Sherwood  may return to work on return date  She may return on this date: 09/05/2021         If you have any questions or concerns, please don't hesitate to call        Angelica Haque PA-C    ______________________________           _______________          _______________  Hospital Representative                              Date                                Time

## 2021-09-02 NOTE — ED PROVIDER NOTES
History  Chief Complaint   Patient presents with    Generalized Body Aches     patient c/o generalized body aches, HA, pain in the chest while breathing  started 2 days ago  denies COVID exposure  + vaccine     60-year-old [place]ian-speaking female presents emergency department with generalized malaise, myalgias, headache, chest discomfort with deep breath over the last 2 days  She was vaccinated against COVID past   Denies nausea, vomiting, diarrhea  Denies cough  No other sick contacts noted  Recently had laboratory studies completed which showed that she had a high thyroid level  She is currently on levothyroxine  She also history of diabetes  Generalized Body Aches      Prior to Admission Medications   Prescriptions Last Dose Informant Patient Reported? Taking?    Trulicity 6 18 FI/8 3NG SOPN   No No   Sig: INJECT 0 5 ML (0 75 MG TOTAL) UNDER THE SKIN ONCE PER WEEK   atorvastatin (LIPITOR) 40 mg tablet  Self No No   Sig: Take 1 tablet (40 mg total) by mouth daily   ciclopirox (PENLAC) 8 % solution  Self No No   Sig: Apply topically daily at bedtime   Patient not taking: Reported on 5/17/2021   ciclopirox (PENLAC) 8 % solution   No No   Sig: Apply topically daily at bedtime   furosemide (LASIX) 20 mg tablet  Self No No   Sig: Take 1 tablet (20 mg total) by mouth daily   ketoconazole (NIZORAL) 2 % cream  Self No No   Sig: Apply topically daily   levothyroxine 112 mcg tablet   No No   Sig: Take 1 tablet (112 mcg total) by mouth daily   naproxen (NAPROSYN) 500 mg tablet  Self No No   Sig: Take 1 tablet (500 mg total) by mouth 2 (two) times a day as needed for mild pain or moderate pain   pramoxine-calamine (CALADRYL) 1-8 % lotion  Self No No   Sig: Apply topically 4 (four) times a day as needed for itching or irritation   sitaGLIPtin-metFORMIN (JANUMET)  MG per tablet   No No   Sig: Take 2 tablets by mouth two times a day with meals   valACYclovir (VALTREX) 1,000 mg tablet   No No   Sig: Take 1 tablet (1,000 mg total) by mouth every 8 (eight) hours for 7 days      Facility-Administered Medications: None       Past Medical History:   Diagnosis Date    Ankle swelling     Calf pain     with walking    Constipation     Diabetes mellitus ([address] )     Disease of thyroid gland     Double vision     GERD (gastroesophageal reflux disease)     H/O: hysterectomy     Headache     Hypertension     Joint pain     Numbness     Palpitations     Polycystic ovary syndrome     SOB (shortness of breath)        Past Surgical History:   Procedure Laterality Date    HYSTERECTOMY         Family History   Problem Relation Age of Onset    Hypertension Mother     Heart disease Mother     Diabetes Mother     Thyroid disease Mother     Thyroid disease Father     Diabetes Father     Hypertension Father     Hypertension Sister     Heart attack Sister     Diabetes Sister     Heart disease Sister     Hypertension Brother     Heart disease Brother     Heart attack Brother     Diabetes Brother      I have reviewed and agree with the history as documented      E-Cigarette/Vaping    E-Cigarette Use Never User      E-Cigarette/Vaping Substances     Social History     Tobacco Use    Smoking status: Never Smoker    Smokeless tobacco: Never Used   Vaping Use    Vaping Use: Never used   Substance Use Topics    Alcohol use: Yes     Comment: rare    Drug use: Never       Review of Systems    Physical Exam  Physical Exam    Vital Signs  ED Triage Vitals   Temperature Pulse Respirations Blood Pressure SpO2   09/02/21 1731 09/02/21 1731 09/02/21 1731 09/02/21 1731 09/02/21 1731   99 °F (37 2 °C) 97 18 135/61 98 %      Temp Source Heart Rate Source Patient Position - Orthostatic VS BP Location FiO2 (%)   09/02/21 1731 09/02/21 2022 09/02/21 1731 09/02/21 1731 --   Oral Monitor Sitting Right arm       Pain Score       09/02/21 1731       Worst Possible Pain           Vitals:    09/02/21 1731 09/02/21 2022 09/02/21 2045 BP: 135/61 124/75 122/72   Pulse: 97 94 90   Patient Position - Orthostatic VS: Sitting Lying          Visual Acuity      ED Medications  Medications   ketorolac (TORADOL) injection 15 mg (15 mg Intravenous Given 9/2/21 1923)   sodium chloride 0 9 % bolus 500 mL (0 mL Intravenous Stopped 9/2/21 2022)   azithromycin (ZITHROMAX) tablet 500 mg (500 mg Oral Given 9/2/21 2058)       Diagnostic Studies  Results Reviewed     Procedure Component Value Units Date/Time    COVID19, Influenza A/B, RSV PCR, [facility] [[id number]]  (Normal) Collected: 09/02/21 1922    Lab Status: Final result Specimen: Nares from Nasopharyngeal Swab Updated: 09/02/21 2023     SARS-CoV-2 Negative     INFLUENZA A PCR Negative     INFLUENZA B PCR Negative     RSV PCR Negative    Narrative: This test has been authorized by FDA under an EUA (Emergency Use Assay) for use by authorized laboratories  Clinical caution and judgement should be used with the interpretation of these results with consideration of the clinical impression and other laboratory testing  Testing reported as "Positive" or "Negative" has been proven to be accurate according to standard laboratory validation requirements  All testing is performed with control materials showing appropriate reactivity at standard intervals      Troponin I [[id number]]  (Normal) Collected: 09/02/21 1922    Lab Status: Final result Specimen: Blood from Arm, Right Updated: 09/02/21 1953     Troponin I <0 02 ng/mL     Comprehensive metabolic panel [[id number]] Collected: 09/02/21 1922    Lab Status: Final result Specimen: Blood from Arm, Right Updated: 09/02/21 1952     Sodium 138 mmol/L      Potassium 3 8 mmol/L      Chloride 102 mmol/L      CO2 26 mmol/L      ANION GAP 10 mmol/L      BUN 8 mg/dL      Creatinine 0 74 mg/dL      Glucose 133 mg/dL      Calcium 8 6 mg/dL      AST 15 U/L      ALT 25 U/L      Alkaline Phosphatase 94 U/L      Total Protein 7 9 g/dL      Albumin 3 6 g/dL      Total Bilirubin 0 42 mg/dL      eGFR 99 ml/min/1 73sq m     Narrative:      National Kidney Disease Foundation guidelines for Chronic Kidney Disease (CKD):     Stage 1 with normal or high GFR (GFR > 90 mL/min/1 73 square meters)    Stage 2 Mild CKD (GFR = 60-89 mL/min/1 73 square meters)    Stage 3A Moderate CKD (GFR = 45-59 mL/min/1 73 square meters)    Stage 3B Moderate CKD (GFR = 30-44 mL/min/1 73 square meters)    Stage 4 Severe CKD (GFR = 15-29 mL/min/1 73 square meters)    Stage 5 End Stage CKD (GFR <15 mL/min/1 73 square meters)  Note: GFR calculation is accurate only with a steady state creatinine    CBC and differential [[id number]]  (Abnormal) Collected: 09/02/21 1922    Lab Status: Final result Specimen: Blood from Arm, Right Updated: 09/02/21 1933     WBC 10 20 Thousand/uL      RBC 4 20 Million/uL      Hemoglobin 13 2 g/dL      Hematocrit 37 6 %      MCV 90 fL      MCH 31 4 pg      MCHC 35 1 g/dL      RDW 13 3 %      MPV 10 7 fL      Platelets 144 Thousands/uL      nRBC 0 /100 WBCs      Neutrophils Relative 79 %      Immat GRANS % 0 %      Lymphocytes Relative 13 %      Monocytes Relative 7 %      Eosinophils Relative 1 %      Basophils Relative 0 %      Neutrophils Absolute 8 01 Thousands/µL      Immature Grans Absolute 0 04 Thousand/uL      Lymphocytes Absolute 1 36 Thousands/µL      Monocytes Absolute 0 72 Thousand/µL      Eosinophils Absolute 0 05 Thousand/µL      Basophils Absolute 0 02 Thousands/µL                  XR chest 1 view portable   ED Interpretation by Amber Donohue PA-C (09/02 1957)   No acute cardiopulmonary abnormalities      Final Result by Malathi Madera MD (09/03 6473)      No acute cardiopulmonary disease                    Workstation performed: [id number]                    Procedures  Procedures         ED Course  ED Course as of Sep 04 0647   Thu Sep 02, 2021   6062 Patient with wbc of 10 20 and left shift      1954 Troponin is negative  CMP normal      2059 Test results were discussed with the patient at bedside  She will be treated with azithromycin to cover for pneumonia  Anticipatory guidance return precautions were discussed at length  All questions were answered to the patient's satisfaction she verbalized understanding with the plan remained stable under my care in the emergency department  SBIRT 22yo+      Most Recent Value   SBIRT (24 yo +)   In order to provide better care to our patients, we are screening all of our patients for alcohol and drug use  Would it be okay to ask you these screening questions? No Filed at: 09/02/2021 1933                    MDM    Disposition  Final diagnoses:   Pneumonia     Time reflects when diagnosis was documented in both MDM as applicable and the Disposition within this note     Time User Action Codes Description Comment    9/2/2021  8:44 PM Janae Liang [J18 [id number]] Pneumonia       ED Disposition     ED Disposition Condition Date/Time Comment    Discharge Stable Thu Sep 2, 2021  8:44 PM Minus Arely discharge to home/self care              Follow-up Information     Follow up With Specialties Details Why [address] [facility] Emergency Department Emergency Medicine  If symptoms worsen [facility] [id number]  [address] [facility] Emergency Department, [address] [place], [place], [address]          Discharge Medication List as of 9/2/2021  8:47 PM      START taking these medications    Details   azithromycin (ZITHROMAX) 250 mg tablet Take 2 tablets today then 1 tablet daily x 4 days, Normal         CONTINUE these medications which have NOT CHANGED    Details   atorvastatin (LIPITOR) 40 mg tablet Take 1 tablet (40 mg total) by mouth daily, Starting Mon 2/8/2021, Normal      !! ciclopirox (PENLAC) 8 % solution Apply topically daily at bedtime, Starting Mon 2/8/2021, Normal      !! ciclopirox (PENLAC) 8 % solution Apply topically daily at bedtime, Starting Mon 3/22/2021, Normal      furosemide (LASIX) 20 mg tablet Take 1 tablet (20 mg total) by mouth daily, Starting Mon 1/11/2021, Normal      ketoconazole (NIZORAL) 2 % cream Apply topically daily, Starting Mon 2/8/2021, Normal      levothyroxine 112 mcg tablet Take 1 tablet (112 mcg total) by mouth daily, Starting Thu 6/24/2021, Normal      naproxen (NAPROSYN) 500 mg tablet Take 1 tablet (500 mg total) by mouth 2 (two) times a day as needed for mild pain or moderate pain, Starting Tue 1/26/2021, Normal      pramoxine-calamine (CALADRYL) 1-8 % lotion Apply topically 4 (four) times a day as needed for itching or irritation, Starting Mon 2/8/2021, Normal      sitaGLIPtin-metFORMIN (JANUMET)  MG per tablet Take 2 tablets by mouth two times a day with meals, Normal      Trulicity 2 79 NV/2 8UE SOPN INJECT 0 5 ML (0 75 MG TOTAL) UNDER THE SKIN ONCE PER WEEK, Normal      valACYclovir (VALTREX) 1,000 mg tablet Take 1 tablet (1,000 mg total) by mouth every 8 (eight) hours for 7 days, Starting Mon 2/8/2021, Until Mon 2/15/2021, Normal       !! - Potential duplicate medications found  Please discuss with provider  No discharge procedures on file      PDMP Review     None          ED Provider  Electronically Signed by           Miguelina Cobian PA-C  09/04/21 6318

## 2021-09-02 NOTE — Clinical Note
Jadiel Gabriel was seen and treated in our emergency department on 9/2/2021  Diagnosis:     Morgan Waddell  may return to work on return date  She may return on this date: 09/05/2021         If you have any questions or concerns, please don't hesitate to call        Chasidy Mena PA-C    ______________________________           _______________          _______________  Hospital Representative                              Date                                Time

## 2021-09-03 NOTE — DISCHARGE INSTRUCTIONS
Results for orders placed or performed during the hospital encounter of 09/02/21   COVID19, Influenza A/B, RSV PCR, [facility]    Specimen: Nasopharyngeal Swab; Nares   Result Value Ref Range    SARS-CoV-2 Negative Negative    INFLUENZA A PCR Negative Negative    INFLUENZA B PCR Negative Negative    RSV PCR Negative Negative   CBC and differential   Result Value Ref Range    WBC 10 20 (H) 4 31 - 10 16 Thousand/uL    RBC 4 20 3 81 - 5 12 Million/uL    Hemoglobin 13 2 11 5 - 15 4 g/dL    Hematocrit 37 6 34 8 - 46 1 %    MCV 90 82 - 98 fL    MCH 31 4 26 8 - 34 3 pg    MCHC 35 1 31 4 - 37 4 g/dL    RDW 13 3 11 6 - 15 1 %    MPV 10 7 8 9 - 12 7 fL    Platelets 104 933 - 108 Thousands/uL    nRBC 0 /100 WBCs    Neutrophils Relative 79 (H) 43 - 75 %    Immat GRANS % 0 0 - 2 %    Lymphocytes Relative 13 (L) 14 - 44 %    Monocytes Relative 7 4 - 12 %    Eosinophils Relative 1 0 - 6 %    Basophils Relative 0 0 - 1 %    Neutrophils Absolute 8 01 (H) 1 85 - 7 62 Thousands/µL    Immature Grans Absolute 0 04 0 00 - 0 20 Thousand/uL    Lymphocytes Absolute 1 36 0 60 - 4 47 Thousands/µL    Monocytes Absolute 0 72 0 17 - 1 22 Thousand/µL    Eosinophils Absolute 0 05 0 00 - 0 61 Thousand/µL    Basophils Absolute 0 02 0 00 - 0 10 Thousands/µL   Comprehensive metabolic panel   Result Value Ref Range    Sodium 138 136 - 145 mmol/L    Potassium 3 8 3 5 - 5 3 mmol/L    Chloride 102 100 - 108 mmol/L    CO2 26 21 - 32 mmol/L    ANION GAP 10 4 - 13 mmol/L    BUN 8 5 - 25 mg/dL    Creatinine 0 74 0 60 - 1 30 mg/dL    Glucose 133 65 - 140 mg/dL    Calcium 8 6 8 3 - 10 1 mg/dL    AST 15 5 - 45 U/L    ALT 25 12 - 78 U/L    Alkaline Phosphatase 94 46 - 116 U/L    Total Protein 7 9 6 4 - 8 2 g/dL    Albumin 3 6 3 5 - 5 0 g/dL    Total Bilirubin 0 42 0 20 - 1 00 mg/dL    eGFR 99 ml/min/1 73sq m   Troponin I   Result Value Ref Range    Troponin I <0 02 <=0 04 ng/mL   ECG 12 lead   Result Value Ref Range    Ventricular Rate 90 BPM    Atrial Rate 90 BPM [place] Interval 126 ms    QRSD Interval 98 ms    QT Interval 374 ms    QTC Interval 457 ms    P Axis 46 degrees    QRS Axis 55 degrees    T Wave Axis 45 degrees     XR chest 1 view portable   ED Interpretation   No acute cardiopulmonary abnormalities

## 2021-09-07 ENCOUNTER — TELEPHONE (OUTPATIENT)
Dept: FAMILY MEDICINE CLINIC | Facility: CLINIC | Age: 46
End: 2021-09-07

## 2021-09-07 DIAGNOSIS — E03.9 HYPOTHYROIDISM, UNSPECIFIED TYPE: ICD-10-CM

## 2021-09-07 RX ORDER — LEVOTHYROXINE SODIUM 0.12 MG/1
125 TABLET ORAL DAILY
Qty: 30 TABLET | Refills: 1 | Status: SHIPPED | OUTPATIENT
Start: 2021-09-07 | End: 2021-10-05 | Stop reason: SDUPTHER

## 2021-09-07 NOTE — TELEPHONE ENCOUNTER
Called patient to discuss recent lab results  Discussed with patient recent lab results of elevated TSH when compared to last results in chart  Patient states to have been taking the levothyroxine without foods upon waking up, she works night shifts  Currently on levothyroxine 112 mcg daily  Given this results, will increase medication to levothyroxine 125 mc daily, continue to take without foods, only water and follow up TSH in 6 weeks to assess efficacy of medication and adjust as warranted  Patient also states that at 35 y/o she had hysterectomy, did  Not took any  hormone replacement  Therapy afterwards and would like recommendations in regards of this  She has upcoming appointment this next Thursday September 9th, and will continue to discuss further to see if further evaluation if warranted  She has been taking also her trulicity weekly injections and states being tolerating well, her sugars are much controlled and her hba1c much improved to 6 8%  Will continue to follow up in upcoming office visit

## 2021-09-09 ENCOUNTER — OFFICE VISIT (OUTPATIENT)
Dept: FAMILY MEDICINE CLINIC | Facility: CLINIC | Age: 46
End: 2021-09-09

## 2021-09-09 VITALS
SYSTOLIC BLOOD PRESSURE: 118 MMHG | HEIGHT: 63 IN | TEMPERATURE: 97.1 F | DIASTOLIC BLOOD PRESSURE: 70 MMHG | HEART RATE: 96 BPM | WEIGHT: 211.9 LBS | BODY MASS INDEX: 37.55 KG/M2 | RESPIRATION RATE: 18 BRPM | OXYGEN SATURATION: 98 %

## 2021-09-09 DIAGNOSIS — E03.9 HYPOTHYROIDISM, UNSPECIFIED TYPE: ICD-10-CM

## 2021-09-09 DIAGNOSIS — E78.5 HYPERLIPIDEMIA, UNSPECIFIED HYPERLIPIDEMIA TYPE: ICD-10-CM

## 2021-09-09 DIAGNOSIS — M25.552 LEFT HIP PAIN: ICD-10-CM

## 2021-09-09 DIAGNOSIS — Z90.710 HISTORY OF HYSTERECTOMY: ICD-10-CM

## 2021-09-09 DIAGNOSIS — E11.69 TYPE 2 DIABETES MELLITUS WITH OTHER SPECIFIED COMPLICATION, WITHOUT LONG-TERM CURRENT USE OF INSULIN (HCC): Primary | ICD-10-CM

## 2021-09-09 PROCEDURE — 3008F BODY MASS INDEX DOCD: CPT | Performed by: FAMILY MEDICINE

## 2021-09-09 PROCEDURE — 3074F SYST BP LT 130 MM HG: CPT | Performed by: FAMILY MEDICINE

## 2021-09-09 PROCEDURE — 99213 OFFICE O/P EST LOW 20 MIN: CPT | Performed by: FAMILY MEDICINE

## 2021-09-09 PROCEDURE — 3078F DIAST BP <80 MM HG: CPT | Performed by: FAMILY MEDICINE

## 2021-09-09 PROCEDURE — 1036F TOBACCO NON-USER: CPT | Performed by: FAMILY MEDICINE

## 2021-09-09 RX ORDER — DULAGLUTIDE 0.75 MG/.5ML
0.75 INJECTION, SOLUTION SUBCUTANEOUS WEEKLY
Qty: 2 ML | Refills: 1 | Status: SHIPPED | OUTPATIENT
Start: 2021-09-09 | End: 2021-10-05 | Stop reason: SDUPTHER

## 2021-09-09 NOTE — ASSESSMENT & PLAN NOTE
-left hip pain, worse when waking, going up stairs, at work as well  -associated with sharp sensation with radiation to lower extremity  -tried physical therapy in the past - not covered by insurance  -discussed with patient of symptoms most likely suggestive of sciatica nerve impingement  -recommended exercises-patient states has a gym nearby  -x ray of the hip to rule out secondary causes  -if symptoms persists, can consider referral to pain management as well

## 2021-09-09 NOTE — ASSESSMENT & PLAN NOTE
-elevated cholesterol, although slight improvement  -triglycerides continue to slightly increase, now in the 400's range  -diabetes and thyroid disease may also play a factor as well  -will aim to continue to improve in diabetes and thyroid control with the aim of achieve better triglycerides control  -if no improvement, will then consider adding medication if warranted

## 2021-09-09 NOTE — ASSESSMENT & PLAN NOTE
Lab Results   Component Value Date    HGBA1C 6 8 (H) 08/30/2021     -much improved from prior  -currently on: janumet 42/721 mg and trulicity weekly injections   -states to be tolerating injection medication well without side effects  -will continue current treatment for now, encouraged adherence to medications as well as diabetic diet  -follow up hba1c in next visit

## 2021-09-09 NOTE — ASSESSMENT & PLAN NOTE
-recent TSH levels elevated, slight decrease in T4  -adjusted to levothyroxine 125 mcg, started recently new dose  -tolerating well so far  -follow up TSH in about 6 weeks to continue to monitor with new dose adjustment and continue to adjust as warranted  -encouraged to take medication only with water, about 1 hour prior to foods

## 2021-09-09 NOTE — PROGRESS NOTES
Assessment/Plan:    Hypothyroidism  -recent TSH levels elevated, slight decrease in T4  -adjusted to levothyroxine 125 mcg, started recently new dose  -tolerating well so far  -follow up TSH in about 6 weeks to continue to monitor with new dose adjustment and continue to adjust as warranted  -encouraged to take medication only with water, about 1 hour prior to foods    Diabetes mellitus ([facility] [address] )    Lab Results   Component Value Date    HGBA1C 6 8 (H) 08/30/2021     -much improved from prior  -currently on: janumet 07/784 mg and trulicity weekly injections   -states to be tolerating injection medication well without side effects  -will continue current treatment for now, encouraged adherence to medications as well as diabetic diet  -follow up hba1c in next visit    Hyperlipidemia  -elevated cholesterol, although slight improvement  -triglycerides continue to slightly increase, now in the 400's range  -diabetes and thyroid disease may also play a factor as well  -will aim to continue to improve in diabetes and thyroid control with the aim of achieve better triglycerides control  -if no improvement, will then consider adding medication if warranted      History of hysterectomy  -reports hx of hysterectomy when 37 y/o at [place] due to hx of fibroids and endometriosis  -did not received hormone replacement therapy treatment  -had symptoms of hot flashes in the past, reports using 'pills, and creams'  -patient interested in evaluation and recommendations for potential hormone replacement therapies  -referral for OBGYN sent    Left hip pain  -left hip pain, worse when waking, going up stairs, at work as well  -associated with sharp sensation with radiation to lower extremity  -tried physical therapy in the past - not covered by insurance  -discussed with patient of symptoms most likely suggestive of sciatica nerve impingement  -recommended exercises-patient states has a gym nearby  -x ray of the hip to rule out secondary causes  -if symptoms persists, can consider referral to pain management as well        Diagnoses and all orders for this visit:    Type 2 diabetes mellitus with other specified complication, without long-term current use of insulin (HCC)  -     Dulaglutide (Trulicity) 3 71 TS/9 8KY SOPN; Inject 0 5 mL (0 75 mg total) under the skin once a week  -     sitaGLIPtin-metFORMIN (JANUMET)  MG per tablet; Take 2 tablets by mouth two times a day with meals  -     HEMOGLOBIN A1C W/ EAG ESTIMATION; Future    Left hip pain  -     XR hip/pelv 2-3 vws right if performed; Future    History of hysterectomy  -     Ambulatory referral to Gynecology; Future    Hypothyroidism, unspecified type    Hyperlipidemia, unspecified hyperlipidemia type          Subjective:      Patient ID: Margaret Hopkins is a 41 y o  female  Case of 42 y/o female who came today for follow up  States to have been compliant with diabetes medication and very happy with results of recent injection medication  Has noticed decrease in weight and better glucose control, at home ranges between: 130-140's  She states to have worsening left hip pain, associated with radiation sharp sensation to the left lower extremity, is worse when walking, standing at work and going up the stairs  Hx of migraines in the past- reports to use Fioricet 40 mg prescribed back in [place]- has some residual pills left         The following portions of the patient's history were reviewed and updated as appropriate: allergies, current medications, past family history, past medical history, past social history, past surgical history and problem list     Review of Systems   Constitutional: Negative for fever  Respiratory: Negative for cough, shortness of breath and wheezing  Cardiovascular: Negative for chest pain, palpitations and leg swelling  Gastrointestinal: Negative for abdominal pain, diarrhea, nausea and vomiting     Musculoskeletal:        Left hip pain   Skin: Negative for color change, pallor, rash and wound  Neurological: Negative for headaches  Psychiatric/Behavioral: The patient is not nervous/anxious  Objective:      /70 (BP Location: Left arm, Patient Position: Sitting, Cuff Size: Standard)   Pulse 96   Temp (!) 97 1 °F (36 2 °C) (Temporal)   Resp 18   Ht 5' 3" (1 6 m)   Wt 96 1 kg (211 lb 14 4 oz)   SpO2 98%   Breastfeeding No   BMI 37 54 kg/m²          Physical Exam  Vitals reviewed  Constitutional:       General: She is not in acute distress  Appearance: Normal appearance  She is not ill-appearing, toxic-appearing or diaphoretic  Eyes:      Extraocular Movements: Extraocular movements intact  Cardiovascular:      Rate and Rhythm: Normal rate and regular rhythm  Pulses: Normal pulses  Heart sounds: Normal heart sounds  No murmur heard  Pulmonary:      Effort: Pulmonary effort is normal  No respiratory distress  Breath sounds: Normal breath sounds  No wheezing  Abdominal:      General: Abdomen is flat  Bowel sounds are normal  There is no distension  Palpations: Abdomen is soft  Tenderness: There is no abdominal tenderness  Musculoskeletal:         General: No swelling, tenderness, deformity or signs of injury  Normal range of motion  Right lower leg: No edema  Left lower leg: No edema  Skin:     General: Skin is warm  Coloration: Skin is not jaundiced or pale  Findings: No bruising, erythema, lesion or rash  Neurological:      General: No focal deficit present  Mental Status: She is alert and oriented to person, place, and time  Mental status is at baseline     Psychiatric:         Mood and Affect: Mood normal

## 2021-09-09 NOTE — ASSESSMENT & PLAN NOTE
-reports hx of hysterectomy when 37 y/o at [place] due to hx of fibroids and endometriosis  -did not received hormone replacement therapy treatment  -had symptoms of hot flashes in the past, reports using 'pills, and creams'  -patient interested in evaluation and recommendations for potential hormone replacement therapies  -referral for OBGYN sent

## 2021-09-10 ENCOUNTER — HOSPITAL ENCOUNTER (OUTPATIENT)
Dept: RADIOLOGY | Facility: HOSPITAL | Age: 46
Discharge: HOME | End: 2021-09-10
Payer: COMMERCIAL

## 2021-09-10 DIAGNOSIS — E03.9 HYPOTHYROIDISM, UNSPECIFIED TYPE: ICD-10-CM

## 2021-09-10 PROCEDURE — 76536 US EXAM OF HEAD AND NECK: CPT

## 2021-09-13 ENCOUNTER — HOSPITAL ENCOUNTER (OUTPATIENT)
Dept: RADIOLOGY | Facility: HOSPITAL | Age: 46
Discharge: HOME | End: 2021-09-13
Payer: COMMERCIAL

## 2021-09-13 ENCOUNTER — APPOINTMENT (OUTPATIENT)
Dept: LAB | Facility: HOSPITAL | Age: 46
End: 2021-09-13
Payer: COMMERCIAL

## 2021-09-13 DIAGNOSIS — E03.9 HYPOTHYROIDISM, UNSPECIFIED TYPE: ICD-10-CM

## 2021-09-13 DIAGNOSIS — E11.69 TYPE 2 DIABETES MELLITUS WITH OTHER SPECIFIED COMPLICATION, WITHOUT LONG-TERM CURRENT USE OF INSULIN (HCC): ICD-10-CM

## 2021-09-13 DIAGNOSIS — M25.552 LEFT HIP PAIN: ICD-10-CM

## 2021-09-13 DIAGNOSIS — E78.5 HYPERLIPIDEMIA, UNSPECIFIED HYPERLIPIDEMIA TYPE: ICD-10-CM

## 2021-09-13 PROCEDURE — 73502 X-RAY EXAM HIP UNI 2-3 VIEWS: CPT

## 2021-09-13 NOTE — QUICK NOTE
Review of Systems - General ROS: negative  Psychological ROS: negative  Respiratory ROS: positive for - cough, shortness of breath and wheezing  Cardiovascular ROS: no chest pain or dyspnea on exertion  Gastrointestinal ROS: no abdominal pain, change in bowel habits, or black or bloody stools  Musculoskeletal ROS: negative  Neurological ROS: no TIA or stroke symptoms    Physical Exam  Vitals and nursing note reviewed  Constitutional:       Appearance: Normal appearance  HENT:      Head: Normocephalic and atraumatic  Nose: Congestion present  No rhinorrhea  Eyes:      General: No scleral icterus  Cardiovascular:      Rate and Rhythm: Normal rate and regular rhythm  Pulses: Normal pulses  Heart sounds: Normal heart sounds  Pulmonary:      Effort: No respiratory distress  Comments: Decrease bibasilar lung sounds  Abdominal:      General: Abdomen is flat  Palpations: Abdomen is soft  Musculoskeletal:         General: Normal range of motion  Cervical back: Normal range of motion  Lymphadenopathy:      Cervical: No cervical adenopathy  Skin:     General: Skin is warm and dry  Capillary Refill: Capillary refill takes less than 2 seconds  Neurological:      General: No focal deficit present  Mental Status: She is alert and oriented to person, place, and time

## 2021-09-28 ENCOUNTER — TELEPHONE (OUTPATIENT)
Dept: FAMILY MEDICINE CLINIC | Facility: CLINIC | Age: 46
End: 2021-09-28

## 2021-10-04 ENCOUNTER — TELEPHONE (OUTPATIENT)
Dept: FAMILY MEDICINE CLINIC | Facility: CLINIC | Age: 46
End: 2021-10-04

## 2021-10-04 NOTE — TELEPHONE ENCOUNTER
Hi,    I called patient to discuss question about hip pain medication but did not responded  Can you please call patient to see when would be a good time to discuss questions? Thanks!

## 2021-10-05 ENCOUNTER — TELEPHONE (OUTPATIENT)
Dept: OTHER | Facility: HOSPITAL | Age: 46
End: 2021-10-05

## 2021-10-05 DIAGNOSIS — M25.552 LEFT HIP PAIN: Primary | ICD-10-CM

## 2021-10-05 DIAGNOSIS — E03.9 HYPOTHYROIDISM, UNSPECIFIED TYPE: ICD-10-CM

## 2021-10-05 DIAGNOSIS — E11.69 TYPE 2 DIABETES MELLITUS WITH OTHER SPECIFIED COMPLICATION, WITHOUT LONG-TERM CURRENT USE OF INSULIN (HCC): ICD-10-CM

## 2021-10-05 RX ORDER — DULAGLUTIDE 0.75 MG/.5ML
0.75 INJECTION, SOLUTION SUBCUTANEOUS WEEKLY
Qty: 2 ML | Refills: 0 | Status: SHIPPED | OUTPATIENT
Start: 2021-10-05 | End: 2021-12-20 | Stop reason: SDUPTHER

## 2021-10-05 RX ORDER — LEVOTHYROXINE SODIUM 0.12 MG/1
125 TABLET ORAL DAILY
Qty: 30 TABLET | Refills: 1 | Status: SHIPPED | OUTPATIENT
Start: 2021-10-05 | End: 2021-12-20 | Stop reason: SDUPTHER

## 2021-10-12 ENCOUNTER — APPOINTMENT (OUTPATIENT)
Dept: LAB | Facility: HOSPITAL | Age: 46
End: 2021-10-12
Payer: COMMERCIAL

## 2021-10-12 LAB
CHOLEST SERPL-MCNC: 248 MG/DL (ref 50–200)
HDLC SERPL-MCNC: 33 MG/DL
NONHDLC SERPL-MCNC: 215 MG/DL
TRIGL SERPL-MCNC: 636 MG/DL
TSH SERPL DL<=0.05 MIU/L-ACNC: 0.42 UIU/ML (ref 0.36–3.74)

## 2021-10-12 PROCEDURE — 36415 COLL VENOUS BLD VENIPUNCTURE: CPT

## 2021-10-12 PROCEDURE — 84443 ASSAY THYROID STIM HORMONE: CPT

## 2021-10-12 PROCEDURE — 80061 LIPID PANEL: CPT

## 2021-11-12 ENCOUNTER — TELEPHONE (OUTPATIENT)
Dept: FAMILY MEDICINE CLINIC | Facility: CLINIC | Age: 46
End: 2021-11-12

## 2021-11-12 DIAGNOSIS — E03.9 HYPOTHYROIDISM, UNSPECIFIED TYPE: Primary | ICD-10-CM

## 2021-11-18 ENCOUNTER — TELEPHONE (OUTPATIENT)
Dept: FAMILY MEDICINE CLINIC | Facility: CLINIC | Age: 46
End: 2021-11-18

## 2021-11-18 DIAGNOSIS — E78.1 HYPERTRIGLYCERIDEMIA: Primary | ICD-10-CM

## 2021-11-18 RX ORDER — FENOFIBRATE 48 MG/1
48 TABLET, COATED ORAL DAILY
Qty: 30 TABLET | Refills: 1 | Status: SHIPPED | OUTPATIENT
Start: 2021-11-18 | End: 2021-12-20 | Stop reason: SDUPTHER

## 2021-12-17 ENCOUNTER — APPOINTMENT (OUTPATIENT)
Dept: LAB | Facility: HOSPITAL | Age: 46
End: 2021-12-17
Payer: COMMERCIAL

## 2021-12-17 DIAGNOSIS — E03.9 HYPOTHYROIDISM, UNSPECIFIED TYPE: ICD-10-CM

## 2021-12-17 LAB
EST. AVERAGE GLUCOSE BLD GHB EST-MCNC: 128 MG/DL
HBA1C MFR BLD: 6.1 %
TSH SERPL DL<=0.05 MIU/L-ACNC: 3.93 UIU/ML (ref 0.36–3.74)

## 2021-12-17 PROCEDURE — 36415 COLL VENOUS BLD VENIPUNCTURE: CPT

## 2021-12-17 PROCEDURE — 3044F HG A1C LEVEL LT 7.0%: CPT | Performed by: INTERNAL MEDICINE

## 2021-12-17 PROCEDURE — 84443 ASSAY THYROID STIM HORMONE: CPT

## 2021-12-17 PROCEDURE — 83036 HEMOGLOBIN GLYCOSYLATED A1C: CPT

## 2021-12-20 ENCOUNTER — OFFICE VISIT (OUTPATIENT)
Dept: FAMILY MEDICINE CLINIC | Facility: CLINIC | Age: 46
End: 2021-12-20

## 2021-12-20 VITALS
HEIGHT: 63 IN | OXYGEN SATURATION: 99 % | TEMPERATURE: 97.4 F | WEIGHT: 199 LBS | RESPIRATION RATE: 16 BRPM | DIASTOLIC BLOOD PRESSURE: 90 MMHG | SYSTOLIC BLOOD PRESSURE: 132 MMHG | HEART RATE: 88 BPM | BODY MASS INDEX: 35.26 KG/M2

## 2021-12-20 DIAGNOSIS — E11.69 TYPE 2 DIABETES MELLITUS WITH OTHER SPECIFIED COMPLICATION, WITHOUT LONG-TERM CURRENT USE OF INSULIN (HCC): ICD-10-CM

## 2021-12-20 DIAGNOSIS — E03.9 HYPOTHYROIDISM, UNSPECIFIED TYPE: ICD-10-CM

## 2021-12-20 DIAGNOSIS — M25.473 ANKLE SWELLING, UNSPECIFIED LATERALITY: ICD-10-CM

## 2021-12-20 DIAGNOSIS — M62.838 MUSCLE SPASM: Primary | ICD-10-CM

## 2021-12-20 DIAGNOSIS — M79.89 LEG SWELLING: ICD-10-CM

## 2021-12-20 DIAGNOSIS — E78.5 HYPERLIPIDEMIA, UNSPECIFIED HYPERLIPIDEMIA TYPE: ICD-10-CM

## 2021-12-20 DIAGNOSIS — E78.1 HYPERTRIGLYCERIDEMIA: ICD-10-CM

## 2021-12-20 PROCEDURE — 99213 OFFICE O/P EST LOW 20 MIN: CPT | Performed by: INTERNAL MEDICINE

## 2021-12-20 PROCEDURE — 3080F DIAST BP >= 90 MM HG: CPT | Performed by: INTERNAL MEDICINE

## 2021-12-20 PROCEDURE — 3075F SYST BP GE 130 - 139MM HG: CPT | Performed by: INTERNAL MEDICINE

## 2021-12-20 PROCEDURE — 3725F SCREEN DEPRESSION PERFORMED: CPT | Performed by: INTERNAL MEDICINE

## 2021-12-20 PROCEDURE — 3008F BODY MASS INDEX DOCD: CPT | Performed by: INTERNAL MEDICINE

## 2021-12-20 PROCEDURE — 1036F TOBACCO NON-USER: CPT | Performed by: INTERNAL MEDICINE

## 2021-12-20 RX ORDER — DULAGLUTIDE 0.75 MG/.5ML
0.75 INJECTION, SOLUTION SUBCUTANEOUS WEEKLY
Qty: 2 ML | Refills: 2 | Status: SHIPPED | OUTPATIENT
Start: 2021-12-20 | End: 2022-01-24 | Stop reason: SDUPTHER

## 2021-12-20 RX ORDER — ATORVASTATIN CALCIUM 40 MG/1
40 TABLET, FILM COATED ORAL DAILY
Qty: 90 TABLET | Refills: 1 | Status: SHIPPED | OUTPATIENT
Start: 2021-12-20 | End: 2022-01-24 | Stop reason: SDUPTHER

## 2021-12-20 RX ORDER — FUROSEMIDE 20 MG/1
20 TABLET ORAL DAILY PRN
Qty: 15 TABLET | Refills: 0 | Status: SHIPPED | OUTPATIENT
Start: 2021-12-20

## 2021-12-20 RX ORDER — FENOFIBRATE 48 MG/1
48 TABLET, COATED ORAL DAILY
Qty: 30 TABLET | Refills: 1 | Status: SHIPPED | OUTPATIENT
Start: 2021-12-20 | End: 2022-01-24 | Stop reason: SDUPTHER

## 2021-12-20 RX ORDER — LEVOTHYROXINE SODIUM 0.12 MG/1
125 TABLET ORAL DAILY
Qty: 30 TABLET | Refills: 1 | Status: SHIPPED | OUTPATIENT
Start: 2021-12-20 | End: 2022-01-24 | Stop reason: SDUPTHER

## 2022-01-18 ENCOUNTER — APPOINTMENT (OUTPATIENT)
Dept: LAB | Facility: HOSPITAL | Age: 47
End: 2022-01-18
Payer: COMMERCIAL

## 2022-01-18 DIAGNOSIS — E03.9 HYPOTHYROIDISM, UNSPECIFIED TYPE: ICD-10-CM

## 2022-01-18 LAB — TSH SERPL DL<=0.05 MIU/L-ACNC: 1.42 UIU/ML (ref 0.36–3.74)

## 2022-01-18 PROCEDURE — 84443 ASSAY THYROID STIM HORMONE: CPT

## 2022-01-18 PROCEDURE — 36415 COLL VENOUS BLD VENIPUNCTURE: CPT

## 2022-01-21 ENCOUNTER — RA CDI HCC (OUTPATIENT)
Dept: OTHER | Facility: HOSPITAL | Age: 47
End: 2022-01-21

## 2022-01-21 NOTE — PROGRESS NOTES
The following dx found on active problem list - please assess using MEAT for  billing    Diabetes mellitus ([facility] ) [E11 9]    [facility] [address]  coding opportunities          Number of diagnosis code(s) already on the problem list added to FYI fla                     Patients insurance company: [employer] (Medicare Advantage and Commercial)             [facility]  [facility]          Number of diagnosis code(s) already on the problem list added to [employer] fla               Number of suggestions used: 1         Patients insurance company: [employer] (Medicare Advantage and Commercial)     Visit status: Patient arrived for their scheduled appointment

## 2022-01-24 ENCOUNTER — OFFICE VISIT (OUTPATIENT)
Dept: FAMILY MEDICINE CLINIC | Facility: CLINIC | Age: 47
End: 2022-01-24

## 2022-01-24 VITALS
BODY MASS INDEX: 35.26 KG/M2 | OXYGEN SATURATION: 99 % | HEART RATE: 98 BPM | HEIGHT: 63 IN | TEMPERATURE: 97.8 F | SYSTOLIC BLOOD PRESSURE: 132 MMHG | DIASTOLIC BLOOD PRESSURE: 76 MMHG | RESPIRATION RATE: 18 BRPM | WEIGHT: 199 LBS

## 2022-01-24 DIAGNOSIS — E11.69 TYPE 2 DIABETES MELLITUS WITH OTHER SPECIFIED COMPLICATION, WITHOUT LONG-TERM CURRENT USE OF INSULIN (HCC): Primary | ICD-10-CM

## 2022-01-24 DIAGNOSIS — E03.9 HYPOTHYROIDISM, UNSPECIFIED TYPE: ICD-10-CM

## 2022-01-24 DIAGNOSIS — E78.1 HYPERTRIGLYCERIDEMIA: ICD-10-CM

## 2022-01-24 DIAGNOSIS — G47.9 DIFFICULTY SLEEPING: ICD-10-CM

## 2022-01-24 DIAGNOSIS — L29.9 ITCH OF SKIN: ICD-10-CM

## 2022-01-24 DIAGNOSIS — M62.838 MUSCLE SPASM: ICD-10-CM

## 2022-01-24 DIAGNOSIS — E78.5 HYPERLIPIDEMIA, UNSPECIFIED HYPERLIPIDEMIA TYPE: ICD-10-CM

## 2022-01-24 DIAGNOSIS — M25.473 ANKLE SWELLING, UNSPECIFIED LATERALITY: ICD-10-CM

## 2022-01-24 PROCEDURE — 3075F SYST BP GE 130 - 139MM HG: CPT | Performed by: INTERNAL MEDICINE

## 2022-01-24 PROCEDURE — 3008F BODY MASS INDEX DOCD: CPT | Performed by: INTERNAL MEDICINE

## 2022-01-24 PROCEDURE — 99213 OFFICE O/P EST LOW 20 MIN: CPT | Performed by: INTERNAL MEDICINE

## 2022-01-24 PROCEDURE — 3078F DIAST BP <80 MM HG: CPT | Performed by: INTERNAL MEDICINE

## 2022-01-24 PROCEDURE — 1036F TOBACCO NON-USER: CPT | Performed by: INTERNAL MEDICINE

## 2022-01-24 RX ORDER — ATORVASTATIN CALCIUM 40 MG/1
40 TABLET, FILM COATED ORAL DAILY
Qty: 90 TABLET | Refills: 1 | Status: SHIPPED | OUTPATIENT
Start: 2022-01-24 | End: 2022-06-29 | Stop reason: SDUPTHER

## 2022-01-24 RX ORDER — FENOFIBRATE 48 MG/1
48 TABLET, COATED ORAL DAILY
Qty: 30 TABLET | Refills: 1 | Status: SHIPPED | OUTPATIENT
Start: 2022-01-24 | End: 2022-01-24

## 2022-01-24 RX ORDER — LANOLIN ALCOHOL/MO/W.PET/CERES
3 CREAM (GRAM) TOPICAL
Qty: 30 TABLET | Refills: 0 | Status: SHIPPED | OUTPATIENT
Start: 2022-01-24 | End: 2022-01-25 | Stop reason: SDUPTHER

## 2022-01-24 RX ORDER — LANOLIN ALCOHOL/MO/W.PET/CERES
3 CREAM (GRAM) TOPICAL
Qty: 30 TABLET | Refills: 0 | Status: SHIPPED | OUTPATIENT
Start: 2022-01-24 | End: 2022-01-24

## 2022-01-24 RX ORDER — LORATADINE 10 MG/1
10 TABLET ORAL DAILY
Qty: 30 TABLET | Refills: 1 | Status: SHIPPED | OUTPATIENT
Start: 2022-01-24

## 2022-01-24 RX ORDER — DULAGLUTIDE 0.75 MG/.5ML
0.75 INJECTION, SOLUTION SUBCUTANEOUS WEEKLY
Qty: 2 ML | Refills: 2 | Status: SHIPPED | OUTPATIENT
Start: 2022-01-24 | End: 2022-03-28 | Stop reason: SDUPTHER

## 2022-01-24 RX ORDER — FENOFIBRATE 48 MG/1
48 TABLET, COATED ORAL DAILY
Qty: 30 TABLET | Refills: 1 | Status: SHIPPED | OUTPATIENT
Start: 2022-01-24 | End: 2022-03-28 | Stop reason: SDUPTHER

## 2022-01-24 RX ORDER — LEVOTHYROXINE SODIUM 0.12 MG/1
125 TABLET ORAL DAILY
Qty: 30 TABLET | Refills: 1 | Status: SHIPPED | OUTPATIENT
Start: 2022-01-24 | End: 2022-03-28 | Stop reason: SDUPTHER

## 2022-01-24 RX ORDER — ATORVASTATIN CALCIUM 40 MG/1
40 TABLET, FILM COATED ORAL DAILY
Qty: 90 TABLET | Refills: 1 | Status: SHIPPED | OUTPATIENT
Start: 2022-01-24 | End: 2022-01-24

## 2022-01-24 RX ORDER — DULAGLUTIDE 0.75 MG/.5ML
0.75 INJECTION, SOLUTION SUBCUTANEOUS WEEKLY
Qty: 2 ML | Refills: 2 | Status: SHIPPED | OUTPATIENT
Start: 2022-01-24 | End: 2022-01-24

## 2022-01-24 NOTE — ASSESSMENT & PLAN NOTE
-much improved  -taking lasix PRN  -continue with dietary modifications, leg elevation at home after work

## 2022-01-24 NOTE — ASSESSMENT & PLAN NOTE
-TSH normal  -on: levothyroxine 125 mc daily, adherent to medications, taking only with water  -continue with current treatment  -f/u TSH next visit

## 2022-01-24 NOTE — ASSESSMENT & PLAN NOTE
Lab Results   Component Value Date    HGBA1C 6 1 (H) 12/17/2021     Controlled  -goal <6 5%  -currently on: trulicity weekly injections  -has noted markedly improvement on blood glucose levels, decrease in weight and hba1c  -has noted some hypolgycemic events, lowest at 74 when she then eat snacks  -glucose ranges: 100-149's with meals  -not much consistent with meals due to work shifts, does not eat much snacks  -discussed with patient importance of having adequate 3 meals with snacks in between to avoid hypoglycemic events  -monitor sugar logs, if further hypoglycemic events notify back

## 2022-01-24 NOTE — PROGRESS NOTES
Assessment/Plan:    Diabetes mellitus ([facility] )    Lab Results   Component Value Date    HGBA1C 6 1 (H) 12/17/2021     Controlled  -goal <6 5%  -currently on: trulicity weekly injections  -has noted markedly improvement on blood glucose levels, decrease in weight and hba1c  -has noted some hypolgycemic events, lowest at 74 when she then eat snacks  -glucose ranges: 100-149's with meals  -not much consistent with meals due to work shifts, does not eat much snacks  -discussed with patient importance of having adequate 3 meals with snacks in between to avoid hypoglycemic events  -monitor sugar logs, if further hypoglycemic events notify back    Hypothyroidism  -TSH normal  -on: levothyroxine 125 mc daily, adherent to medications, taking only with water  -continue with current treatment  -f/u TSH next visit     Hyperlipidemia  -on atorvastatin 40 mg and tricor 48 mg for hypertriglyceridemia  -f/u lipid panel  -continue low fat, low cholesterol diet    Difficulty sleeping  -noticed difficulty sleeping during days not working due to working night shifts  -difficulty to returning to regular schedule of sleeping at night time during days off  -discussed with patient that symptoms can be related to changes in sleep schedule due to working night shifts  -trial of melatonin, take at least 30 minutes prior to sleep    Muscle spasm  -has been attending chiropractor on a weekly basis and has noticed much improvement in symptoms  -continue with treatment as scheduled     Ankle swelling  -much improved  -taking lasix PRN  -continue with dietary modifications, leg elevation at home after work        Diagnoses and all orders for this visit:    Type 2 diabetes mellitus with other specified complication, without long-term current use of insulin ([facility] )  -     Discontinue: Dulaglutide (Trulicity) 2 07 QL/0 6HD SOPN; Inject 0 5 mL (0 75 mg total) under the skin once a week  -     HEMOGLOBIN A1C W/ EAG ESTIMATION;  Future  -     Dulaglutide (Trulicity) 5 63 EL/6 3CF SOPN; Inject 0 5 mL (0 75 mg total) under the skin once a week    Itch of skin  -     loratadine (CLARITIN) 10 mg tablet; Take 1 tablet (10 mg total) by mouth daily    Difficulty sleeping  -     Discontinue: melatonin 3 mg; Take 1 tablet (3 mg total) by mouth daily at bedtime  -     melatonin 3 mg; Take 1 tablet (3 mg total) by mouth daily at bedtime    Hyperlipidemia, unspecified hyperlipidemia type  -     Discontinue: atorvastatin (LIPITOR) 40 mg tablet; Take 1 tablet (40 mg total) by mouth daily  -     Lipid panel; Future  -     atorvastatin (LIPITOR) 40 mg tablet; Take 1 tablet (40 mg total) by mouth daily    Hypertriglyceridemia  -     Discontinue: fenofibrate (TRICOR) 48 mg tablet; Take 1 tablet (48 mg total) by mouth daily  -     Lipid panel; Future  -     fenofibrate (TRICOR) 48 mg tablet; Take 1 tablet (48 mg total) by mouth daily    Hypothyroidism, unspecified type  -     levothyroxine 125 mcg tablet; Take 1 tablet (125 mcg total) by mouth daily    Muscle spasm    Ankle swelling, unspecified laterality          Subjective:      Patient ID: Miguel Norman is a 40 y o  female  Case of 41 y/o female who came today for follow up  She indicates to have been doing much better with current medications  She also states that has noticed some hypoglycemic events at times, as low as 70's sometimes and eats some snacks when this happens  She has not been able to maintain a good meals routine due to work and sometimes unable to eat snacks during work shifts  She states to have also noticed some difficulty returning to regular routine of sleep during night time on days off and would like to know about recommendations on melatonin trial      She also has been attending chiropractor for muscle spasms and has noted great improvement        The following portions of the patient's history were reviewed and updated as appropriate: allergies, current medications, past family history, past medical history, past social history, past surgical history and problem list     Review of Systems   Constitutional: Negative for fever  Respiratory: Negative for cough, shortness of breath and wheezing  Cardiovascular: Negative for chest pain, palpitations and leg swelling  Gastrointestinal: Negative for diarrhea, nausea and vomiting  Skin: Negative for color change, pallor, rash and wound  Neurological: Negative for headaches  Psychiatric/Behavioral: The patient is not nervous/anxious  Objective:      /76 (BP Location: Left arm, Patient Position: Sitting, Cuff Size: Large)   Pulse 98   Temp 97 8 °F (36 6 °C) (Temporal)   Resp 18   Ht 5' 3" (1 6 m)   Wt 90 3 kg (199 lb)   SpO2 99%   BMI 35 25 kg/m²          Physical Exam  Vitals reviewed  Constitutional:       General: She is not in acute distress  Appearance: Normal appearance  She is not ill-appearing, toxic-appearing or diaphoretic  Eyes:      Extraocular Movements: Extraocular movements intact  Cardiovascular:      Rate and Rhythm: Normal rate and regular rhythm  Pulses: Normal pulses  Heart sounds: Normal heart sounds  No murmur heard  Pulmonary:      Effort: Pulmonary effort is normal  No respiratory distress  Breath sounds: Normal breath sounds  No wheezing  Abdominal:      General: Abdomen is flat  Bowel sounds are normal  There is no distension  Palpations: Abdomen is soft  Tenderness: There is no abdominal tenderness  Musculoskeletal:         General: No swelling, tenderness, deformity or signs of injury  Normal range of motion  Right lower leg: No edema  Left lower leg: No edema  Skin:     General: Skin is warm  Coloration: Skin is not jaundiced or pale  Findings: No bruising, erythema, lesion or rash  Neurological:      General: No focal deficit present  Mental Status: She is alert and oriented to person, place, and time   Mental status is at baseline     Psychiatric:         Mood and Affect: Mood normal

## 2022-01-24 NOTE — ASSESSMENT & PLAN NOTE
-on atorvastatin 40 mg and tricor 48 mg for hypertriglyceridemia  -f/u lipid panel  -continue low fat, low cholesterol diet

## 2022-01-24 NOTE — ASSESSMENT & PLAN NOTE
-noticed difficulty sleeping during days not working due to working night shifts  -difficulty to returning to regular schedule of sleeping at night time during days off  -discussed with patient that symptoms can be related to changes in sleep schedule due to working night shifts  -trial of melatonin, take at least 30 minutes prior to sleep

## 2022-01-24 NOTE — ASSESSMENT & PLAN NOTE
-has been attending chiropractor on a weekly basis and has noticed much improvement in symptoms  -continue with treatment as scheduled

## 2022-01-25 DIAGNOSIS — G47.9 DIFFICULTY SLEEPING: ICD-10-CM

## 2022-01-25 RX ORDER — LANOLIN ALCOHOL/MO/W.PET/CERES
3 CREAM (GRAM) TOPICAL
Qty: 30 TABLET | Refills: 0 | Status: SHIPPED | OUTPATIENT
Start: 2022-01-25

## 2022-03-21 ENCOUNTER — RA CDI HCC (OUTPATIENT)
Dept: OTHER | Facility: HOSPITAL | Age: 47
End: 2022-03-21

## 2022-03-21 NOTE — PROGRESS NOTES
Please review if the following dx  is applicable to the patient's condition and assess and document, if applicable in next visit on 03/28/2022     E66 01: Morbid (severe) obesity due to excess calories ([address] )    Per CMS/ICD 10 coding guidelines, to be used when BMI > 35 & <40 with one or more comorbidity (DM, HTN, or BG)    [address]  coding opportunities          Chart Reviewed number of suggestions sent to Provider: 1     Patients Insurance        Commercial Insurance: [employer]

## 2022-03-23 ENCOUNTER — APPOINTMENT (OUTPATIENT)
Dept: LAB | Facility: HOSPITAL | Age: 47
End: 2022-03-23
Payer: COMMERCIAL

## 2022-03-23 DIAGNOSIS — E11.69 TYPE 2 DIABETES MELLITUS WITH OTHER SPECIFIED COMPLICATION, WITHOUT LONG-TERM CURRENT USE OF INSULIN (HCC): ICD-10-CM

## 2022-03-23 DIAGNOSIS — E78.1 HYPERTRIGLYCERIDEMIA: ICD-10-CM

## 2022-03-23 DIAGNOSIS — E78.5 HYPERLIPIDEMIA, UNSPECIFIED HYPERLIPIDEMIA TYPE: ICD-10-CM

## 2022-03-23 LAB
CHOLEST SERPL-MCNC: 238 MG/DL
EST. AVERAGE GLUCOSE BLD GHB EST-MCNC: 123 MG/DL
HBA1C MFR BLD: 5.9 %
HDLC SERPL-MCNC: 47 MG/DL
LDLC SERPL CALC-MCNC: 158 MG/DL (ref 0–100)
NONHDLC SERPL-MCNC: 191 MG/DL
TRIGL SERPL-MCNC: 167 MG/DL

## 2022-03-23 PROCEDURE — 83036 HEMOGLOBIN GLYCOSYLATED A1C: CPT

## 2022-03-23 PROCEDURE — 36415 COLL VENOUS BLD VENIPUNCTURE: CPT

## 2022-03-23 PROCEDURE — 3044F HG A1C LEVEL LT 7.0%: CPT | Performed by: INTERNAL MEDICINE

## 2022-03-23 PROCEDURE — 80061 LIPID PANEL: CPT

## 2022-03-28 ENCOUNTER — OFFICE VISIT (OUTPATIENT)
Dept: FAMILY MEDICINE CLINIC | Facility: CLINIC | Age: 47
End: 2022-03-28

## 2022-03-28 VITALS
WEIGHT: 202 LBS | BODY MASS INDEX: 35.79 KG/M2 | HEART RATE: 99 BPM | OXYGEN SATURATION: 97 % | TEMPERATURE: 98 F | DIASTOLIC BLOOD PRESSURE: 78 MMHG | RESPIRATION RATE: 16 BRPM | SYSTOLIC BLOOD PRESSURE: 110 MMHG | HEIGHT: 63 IN

## 2022-03-28 DIAGNOSIS — E03.9 HYPOTHYROIDISM, UNSPECIFIED TYPE: ICD-10-CM

## 2022-03-28 DIAGNOSIS — E78.5 HYPERLIPIDEMIA, UNSPECIFIED HYPERLIPIDEMIA TYPE: ICD-10-CM

## 2022-03-28 DIAGNOSIS — E78.1 HYPERTRIGLYCERIDEMIA: ICD-10-CM

## 2022-03-28 DIAGNOSIS — G43.919 INTRACTABLE MIGRAINE WITHOUT STATUS MIGRAINOSUS, UNSPECIFIED MIGRAINE TYPE: ICD-10-CM

## 2022-03-28 DIAGNOSIS — E11.69 TYPE 2 DIABETES MELLITUS WITH OTHER SPECIFIED COMPLICATION, WITHOUT LONG-TERM CURRENT USE OF INSULIN (HCC): Primary | ICD-10-CM

## 2022-03-28 PROCEDURE — 3074F SYST BP LT 130 MM HG: CPT | Performed by: INTERNAL MEDICINE

## 2022-03-28 PROCEDURE — 3725F SCREEN DEPRESSION PERFORMED: CPT | Performed by: INTERNAL MEDICINE

## 2022-03-28 PROCEDURE — 99213 OFFICE O/P EST LOW 20 MIN: CPT | Performed by: INTERNAL MEDICINE

## 2022-03-28 PROCEDURE — 3078F DIAST BP <80 MM HG: CPT | Performed by: INTERNAL MEDICINE

## 2022-03-28 PROCEDURE — 3008F BODY MASS INDEX DOCD: CPT | Performed by: INTERNAL MEDICINE

## 2022-03-28 PROCEDURE — 1036F TOBACCO NON-USER: CPT | Performed by: INTERNAL MEDICINE

## 2022-03-28 RX ORDER — DULAGLUTIDE 0.75 MG/.5ML
0.75 INJECTION, SOLUTION SUBCUTANEOUS WEEKLY
Qty: 2 ML | Refills: 2 | Status: SHIPPED | OUTPATIENT
Start: 2022-03-28 | End: 2022-06-29 | Stop reason: SDUPTHER

## 2022-03-28 RX ORDER — FENOFIBRATE 48 MG/1
48 TABLET, COATED ORAL DAILY
Qty: 30 TABLET | Refills: 1 | Status: SHIPPED | OUTPATIENT
Start: 2022-03-28

## 2022-03-28 RX ORDER — LEVOTHYROXINE SODIUM 0.12 MG/1
125 TABLET ORAL DAILY
Qty: 30 TABLET | Refills: 1 | Status: SHIPPED | OUTPATIENT
Start: 2022-03-28 | End: 2022-06-29 | Stop reason: SDUPTHER

## 2022-03-28 RX ORDER — TOPIRAMATE 25 MG/1
25 TABLET ORAL 2 TIMES DAILY
Qty: 60 TABLET | Refills: 0 | Status: SHIPPED | OUTPATIENT
Start: 2022-03-28 | End: 2022-04-25 | Stop reason: SDUPTHER

## 2022-03-28 NOTE — PROGRESS NOTES
Assessment/Plan:    [facility]    Lab Results   Component Value Date    HGBA1C 5 9 (H) 03/23/2022     -At goal  -<6 5-7 0%  -current medication: trulicity weekly, tolerating well  -reports sporadic hypoglycemic episodes at work , eats snacks at those times  -following diet and lifestyle modifications  -will continue with current treatment at this time  -monitor sugar and eat snacks if hypoglycemic events  -f/u hba1c, if continues with good control trend can consider in the future trial with only oral medication    Hypothyroidism  -TSH normal  -on levothyroxine 125 mcg daily  -thyroid exam normal today   -continue current treatment    Hyperlipidemia  -lipid panel improved cholesterol and triglycerides level  -current medications: atorvastatin 40 mg and tricor 48 mg  -reports taking only tricor  -also more adherent to lifestyle modifications  -reminded patient importance of taking both medications daily to continue to improve cholesterol levels  -continue with diet and lifestyle modifications    Intractable migraine without status migrainosus  -hx of migraines in the past--> prior taken fioricet  -back in 2010 had a fall, migraines worse since then  -symptoms worse in frontal head; light sensitivity  -taking excedrin/ tylenol PRN  -discussed with patient about importance to have maintenance medication for better control of migraines  -will start trial with: topamax 25 mg BID  -can take tylenol prn for pain control  -f/u next visit and titrate medication as warranted  -avoidance of triggers       Diagnoses and all orders for this visit:    Type 2 diabetes mellitus with other specified complication, without long-term current use of insulin ([facility])  -     Dulaglutide (Trulicity) 6 30 XP/2 8IQ SOPN; Inject 0 5 mL (0 75 mg total) under the skin once a week    Hypertriglyceridemia  -     Lipid panel; Future  -     fenofibrate (TRICOR) 48 mg tablet;  Take 1 tablet (48 mg total) by mouth daily    Hyperlipidemia, unspecified hyperlipidemia type  -     Lipid panel; Future    Hypothyroidism, unspecified type  -     TSH, 3rd generation; Future  -     levothyroxine 125 mcg tablet; Take 1 tablet (125 mcg total) by mouth daily    Intractable migraine without status migrainosus, unspecified migraine type  -     topiramate (Topamax) 25 mg tablet; Take 1 tablet (25 mg total) by mouth 2 (two) times a day          Subjective:      Patient ID: Trang Hays is a 40 y o  female  Case of 41 y/o female who came today for follow up  She indicates to have been compliant to her medications and has continued to make lifestyle modifications  She reports that has hx of migraines, prior on fioricet in the past  Had a fall in 2010 where hit her head and migraines have been worse since then  Localized in the frontal area, has some sensitivity to light  Taking excedrin/tylenol PRN  2 days ago last episode  The following portions of the patient's history were reviewed and updated as appropriate: allergies, current medications, past family history, past medical history, past social history, past surgical history and problem list     Review of Systems   Constitutional: Negative for fever  Respiratory: Negative for cough, shortness of breath and wheezing  Cardiovascular: Negative for chest pain, palpitations and leg swelling  Gastrointestinal: Negative for abdominal pain  Skin: Negative  Neurological: Positive for headaches ( migraines)  Psychiatric/Behavioral: The patient is not nervous/anxious  Objective:      /78 (BP Location: Left arm, Patient Position: Sitting, Cuff Size: Large)   Pulse 99   Temp 98 °F (36 7 °C) (Temporal)   Resp 16   Ht 5' 3" (1 6 m)   Wt 91 6 kg (202 lb)   SpO2 97%   BMI 35 78 kg/m²          Physical Exam  Constitutional:       General: She is not in acute distress  Appearance: Normal appearance  She is normal weight   She is not ill-appearing, toxic-appearing or diaphoretic  Eyes:      Extraocular Movements: Extraocular movements intact  Neck:      Comments: No palpable thyroid nodules  Cardiovascular:      Rate and Rhythm: Normal rate and regular rhythm  Pulses: Normal pulses  Heart sounds: Normal heart sounds  No murmur heard  Pulmonary:      Effort: Pulmonary effort is normal  No respiratory distress  Breath sounds: Normal breath sounds  No wheezing  Abdominal:      General: Abdomen is flat  Bowel sounds are normal  There is no distension  Tenderness: There is no abdominal tenderness  Musculoskeletal:         General: Normal range of motion  Skin:     General: Skin is warm  Neurological:      Mental Status: She is alert  Mental status is at baseline     Psychiatric:         Mood and Affect: Mood normal

## 2022-03-29 NOTE — ASSESSMENT & PLAN NOTE
-TSH normal  -on levothyroxine 125 mcg daily  -thyroid exam normal today   -continue current treatment

## 2022-03-29 NOTE — ASSESSMENT & PLAN NOTE
Lab Results   Component Value Date    HGBA1C 5 9 (H) 03/23/2022     -At goal  -<6 5-7 0%  -current medication: trulicity weekly, tolerating well  -reports sporadic hypoglycemic episodes at work , eats snacks at those times  -following diet and lifestyle modifications  -will continue with current treatment at this time  -monitor sugar and eat snacks if hypoglycemic events  -f/u hba1c, if continues with good control trend can consider in the future trial with only oral medication

## 2022-03-29 NOTE — ASSESSMENT & PLAN NOTE
-lipid panel improved cholesterol and triglycerides level  -current medications: atorvastatin 40 mg and tricor 48 mg  -reports taking only tricor  -also more adherent to lifestyle modifications  -reminded patient importance of taking both medications daily to continue to improve cholesterol levels  -continue with diet and lifestyle modifications

## 2022-03-29 NOTE — ASSESSMENT & PLAN NOTE
-hx of migraines in the past--> prior taken fioricet  -back in 2010 had a fall, migraines worse since then  -symptoms worse in frontal head; light sensitivity  -taking excedrin/ tylenol PRN  -discussed with patient about importance to have maintenance medication for better control of migraines  -will start trial with: topamax 25 mg BID  -can take tylenol prn for pain control  -f/u next visit and titrate medication as warranted  -avoidance of triggers

## 2022-04-15 ENCOUNTER — TELEPHONE (OUTPATIENT)
Dept: FAMILY MEDICINE CLINIC | Facility: CLINIC | Age: 47
End: 2022-04-15

## 2022-04-15 NOTE — TELEPHONE ENCOUNTER
Pt called requesting if dosage for the following medication can be a little higher due to medication not really effective       topiramate (Topamax) 25 mg tablet

## 2022-04-22 NOTE — TELEPHONE ENCOUNTER
Pt states every to days sometimes weekly  Pt agree for you to increase to 50 mg 2 times a day   Please sent to pharmacy

## 2022-04-25 DIAGNOSIS — G43.919 INTRACTABLE MIGRAINE WITHOUT STATUS MIGRAINOSUS, UNSPECIFIED MIGRAINE TYPE: ICD-10-CM

## 2022-04-25 RX ORDER — TOPIRAMATE 50 MG/1
50 TABLET, FILM COATED ORAL 2 TIMES DAILY
Qty: 60 TABLET | Refills: 0 | Status: SHIPPED | OUTPATIENT
Start: 2022-04-25 | End: 2022-06-29 | Stop reason: SDUPTHER

## 2022-05-09 ENCOUNTER — OFFICE VISIT (OUTPATIENT)
Dept: FAMILY MEDICINE CLINIC | Facility: CLINIC | Age: 47
End: 2022-05-09

## 2022-05-09 VITALS
WEIGHT: 203 LBS | OXYGEN SATURATION: 97 % | SYSTOLIC BLOOD PRESSURE: 116 MMHG | DIASTOLIC BLOOD PRESSURE: 74 MMHG | RESPIRATION RATE: 18 BRPM | BODY MASS INDEX: 35.97 KG/M2 | HEART RATE: 83 BPM | HEIGHT: 63 IN | TEMPERATURE: 96.9 F

## 2022-05-09 DIAGNOSIS — G43.919 INTRACTABLE MIGRAINE WITHOUT STATUS MIGRAINOSUS, UNSPECIFIED MIGRAINE TYPE: Primary | ICD-10-CM

## 2022-05-09 DIAGNOSIS — M62.838 MUSCLE SPASM: ICD-10-CM

## 2022-05-09 PROBLEM — Z12.31 SCREENING MAMMOGRAM, ENCOUNTER FOR: Status: RESOLVED | Noted: 2020-12-11 | Resolved: 2022-05-09

## 2022-05-09 PROBLEM — Z90.710 HISTORY OF HYSTERECTOMY: Status: RESOLVED | Noted: 2021-09-09 | Resolved: 2022-05-09

## 2022-05-09 PROCEDURE — 99214 OFFICE O/P EST MOD 30 MIN: CPT

## 2022-05-09 PROCEDURE — 1036F TOBACCO NON-USER: CPT

## 2022-05-09 NOTE — PATIENT INSTRUCTIONS
Muscle Spasm   WHAT YOU NEED TO KNOW:   A muscle spasm is a sudden contraction of any muscle or group of muscles  A muscle cramp is a painful muscle spasm  Muscle cramps commonly occur after intense exercise or during pregnancy  They may also be caused by certain medications, dehydration, low calcium or magnesium levels, or another medical condition  DISCHARGE INSTRUCTIONS:   Medicines: You may need the following:  · NSAIDs  help decrease swelling and pain or fever  This medicine is available with or without a doctor's order  NSAIDs can cause stomach bleeding or kidney problems in certain people  If you take blood thinner medicine, always ask your healthcare provider if NSAIDs are safe for you  Always read the medicine label and follow directions  · Take your medicine as directed  Contact your healthcare provider if you think your medicine is not helping or if you have side effects  Tell him of her if you are allergic to any medicine  Keep a list of the medicines, vitamins, and herbs you take  Include the amounts, and when and why you take them  Bring the list or the pill bottles to follow-up visits  Carry your medicine list with you in case of an emergency  Follow up with your healthcare provider as directed: You may need other tests or treatment  You may also be referred to a physical therapist or other specialist  Write down your questions so you remember to ask them during your visits  Self-care:   · Stretch  your muscle to help relieve the cramp  It may be helpful to keep your muscle in the stretched position until the cramp is gone  · Apply heat  to help decrease pain and muscle spasms  Apply heat on the area for 20 to 30 minutes every 2 hours for as many days as directed  · Apply ice  to help decrease swelling and pain  Ice may also help prevent tissue damage  Use an ice pack, or put crushed ice in a plastic bag   Cover it with a towel and place it on your muscle for 15 to 20 minutes every hour or as directed  · Drink more liquids  to help prevent muscle cramps caused by dehydration  Sports drinks may help replace electrolytes you lose through sweat during exercise  Ask your healthcare provider how much liquid to drink each day and which liquids are best for you  · Eat healthy foods , such as fruits, vegetables, whole grains, low-fat dairy products, and lean proteins (meat, beans, and fish)  If you are pregnant, ask your healthcare provider about foods that are high in magnesium and sodium  They may help to relieve cramps during pregnancy  · Massage your muscle  to help relieve the cramp  · Take frequent deep breaths  until the cramp feels better  Lie down while you take the deep breaths so you do not get dizzy or lightheaded  Contact your healthcare provider if:   · You have signs of dehydration, such as a headache, dark yellow urine, dry eyes or mouth, or a fast heartbeat  · You have questions or concerns about your condition or care  Return to the emergency department if:   · You have warmth, swelling, or redness in the cramping muscle  · You have frequent or unrelieved muscle cramps in several different muscles  · You have muscle cramps with numbness, tingling, and burning in your hands and feet  © Copyright [employer] 2022 Information is for End User's use only and may not be sold, redistributed or otherwise used for commercial purposes  All illustrations and images included in CareNotes® are the copyrighted property of A D A M , Inc  or [employer]   The above information is an  only  It is not intended as medical advice for individual conditions or treatments  Talk to your doctor, nurse or pharmacist before following any medical regimen to see if it is safe and effective for you

## 2022-05-09 NOTE — ASSESSMENT & PLAN NOTE
Recommend home stretches, heat/ice, and tylenol as needed    Recommend she see chiropractor again as he helped in the past

## 2022-05-09 NOTE — PROGRESS NOTES
Assessment/Plan:    Muscle spasm  Recommend home stretches, heat/ice, and tylenol as needed  Recommend she see chiropractor again as he helped in the past      Intractable migraine without status migrainosus  Recommend she take topamax 50 mg bid daily consistently in order for the medication to work  Diagnoses and all orders for this visit:    Intractable migraine without status migrainosus, unspecified migraine type    Muscle spasm          Subjective:      Patient ID: Doreen Watts is a 40 y o  female  Doreen Watts is a 40 y o  female  has a past medical history of Ankle swelling, Calf pain, Constipation, Diabetes mellitus ([address] ), Disease of thyroid gland, Double vision, GERD (gastroesophageal reflux disease), H/O: hysterectomy, Headache, History of hysterectomy, Hypertension, Joint pain, Numbness, Palpitations, Polycystic ovary syndrome, and SOB (shortness of breath)  has a past surgical history that includes Hysterectomy  She presents today for a migraine follow-up  She was started on topamax 50 mg bid  She has not been taking it daily  She has been using it as needed which is not helping  She also endorses an increase in muscle spasms of her posterior right shoulder region  that have been occurring more often since she stopped seeing her chiropractor about 2 months ago  The following portions of the patient's history were reviewed and updated as appropriate: allergies, current medications, past family history, past medical history, past social history, past surgical history and problem list     Review of Systems   Constitutional: Negative for chills and fever  HENT: Negative for ear pain and sore throat  Eyes: Negative for pain and visual disturbance  Respiratory: Negative for cough and shortness of breath  Cardiovascular: Negative for chest pain and palpitations  Gastrointestinal: Negative for abdominal pain and vomiting     Genitourinary: Negative for dysuria and hematuria  Musculoskeletal: Positive for myalgias  Negative for arthralgias and back pain  Skin: Negative for color change and rash  Neurological: Positive for headaches  Negative for seizures and syncope  All other systems reviewed and are negative  Objective:      /74 (BP Location: Left arm, Patient Position: Sitting, Cuff Size: Large)   Pulse 83   Temp (!) 96 9 °F (36 1 °C) (Temporal)   Resp 18   Ht 5' 3" (1 6 m)   Wt 92 1 kg (203 lb)   SpO2 97%   BMI 35 96 kg/m²          Physical Exam  Vitals and nursing note reviewed  Constitutional:       Appearance: She is obese  HENT:      Head: Normocephalic and atraumatic  Right Ear: External ear normal       Left Ear: External ear normal       Nose: Nose normal    Eyes:      Extraocular Movements: Extraocular movements intact  Conjunctiva/sclera: Conjunctivae normal       Pupils: Pupils are equal, round, and reactive to light  Cardiovascular:      Rate and Rhythm: Normal rate and regular rhythm  Pulses: Normal pulses  Heart sounds: Normal heart sounds  Pulmonary:      Effort: Pulmonary effort is normal       Breath sounds: Normal breath sounds  Abdominal:      Palpations: Abdomen is soft  Tenderness: There is no abdominal tenderness  Musculoskeletal:         General: Normal range of motion  Cervical back: Normal range of motion  Skin:     General: Skin is warm and dry  Neurological:      General: No focal deficit present  Mental Status: She is alert and oriented to person, place, and time  Mental status is at baseline  Psychiatric:         Mood and Affect: Mood normal          Behavior: Behavior normal          Thought Content:  Thought content normal

## 2022-05-31 ENCOUNTER — OFFICE VISIT (OUTPATIENT)
Dept: FAMILY MEDICINE CLINIC | Facility: CLINIC | Age: 47
End: 2022-05-31

## 2022-05-31 VITALS
OXYGEN SATURATION: 98 % | TEMPERATURE: 98.4 F | DIASTOLIC BLOOD PRESSURE: 80 MMHG | WEIGHT: 205 LBS | RESPIRATION RATE: 16 BRPM | HEART RATE: 94 BPM | SYSTOLIC BLOOD PRESSURE: 122 MMHG | HEIGHT: 63 IN | BODY MASS INDEX: 36.32 KG/M2

## 2022-05-31 DIAGNOSIS — K21.9 GASTROESOPHAGEAL REFLUX DISEASE WITHOUT ESOPHAGITIS: Primary | ICD-10-CM

## 2022-05-31 DIAGNOSIS — R14.0 BLOATING: ICD-10-CM

## 2022-05-31 DIAGNOSIS — R10.13 EPIGASTRIC PAIN: ICD-10-CM

## 2022-05-31 PROCEDURE — 3008F BODY MASS INDEX DOCD: CPT

## 2022-05-31 PROCEDURE — 99213 OFFICE O/P EST LOW 20 MIN: CPT | Performed by: FAMILY MEDICINE

## 2022-05-31 RX ORDER — SIMETHICONE 125 MG
125 CAPSULE ORAL EVERY 6 HOURS PRN
Qty: 28 CAPSULE | Refills: 1 | Status: SHIPPED | OUTPATIENT
Start: 2022-05-31 | End: 2022-06-29

## 2022-05-31 NOTE — ASSESSMENT & PLAN NOTE
No improvement with OTC omeprazole for several months to years   Will order stool H pylori, advice to stop the omeprazole for a few weeks before doing this test   Will reevealuate and treat as needed if H pylori positive, if negative will consider a referal to GI for possible scoping given the chronicity of the symptoms

## 2022-05-31 NOTE — PROGRESS NOTES
Assessment/Plan:    Gastroesophageal reflux disease without esophagitis  No improvement with OTC omeprazole for several months to years   Will order stool H pylori, advice to stop the omeprazole for a few weeks before doing this test   Will reevealuate and treat as needed if H pylori positive, if negative will consider a referal to GI for possible scoping given the chronicity of the symptoms        Diagnoses and all orders for this visit:    Gastroesophageal reflux disease without esophagitis  -     H  pylori antigen, stool; Future    Epigastric pain  -     H  pylori antigen, stool; Future  -     simethicone (MYLICON,GAS-X) 207 MG CAPS; Take 1 capsule (125 mg total) by mouth every 6 (six) hours as needed for flatulence    Bloating  -     simethicone (MYLICON,GAS-X) 666 MG CAPS; Take 1 capsule (125 mg total) by mouth every 6 (six) hours as needed for flatulence          [facility]ive[facility]   [facility]  [id number]      Patient ID: Debora Palma is a 40 y o  female  Abdominal Pain  This is a recurrent problem  The current episode started more than 1 year ago  The problem occurs intermittently  The problem has been gradually worsening  The pain is located in the epigastric region  The pain is at a severity of 10/10  The pain is moderate  The quality of the pain is dull  The abdominal pain does not radiate  Associated symptoms include flatus and nausea  Pertinent negatives include no constipation, diarrhea, fever or vomiting  The pain is aggravated by eating  The pain is relieved by nothing  She has tried H2 blockers and proton pump inhibitors for the symptoms  The treatment provided no relief  The following portions of the patient's history were reviewed and updated as appropriate: allergies, current medications, past family history, past medical history, past social history, past surgical history and problem list     Review of Systems   Constitutional: Negative for chills and fever  Gastrointestinal: Positive for abdominal pain, flatus and nausea  Negative for constipation, diarrhea and vomiting  Objective:      /80 (BP Location: Right arm, Patient Position: Sitting, Cuff Size: Large)   Pulse 94   Temp 98 4 °F (36 9 °C) (Temporal)   Resp 16   Ht 5' 3" (1 6 m)   Wt 93 kg (205 lb)   SpO2 98%   Breastfeeding No   BMI 36 31 kg/m²          Physical Exam  Constitutional:       General: She is not in acute distress  Appearance: She is well-developed  HENT:      Head: Normocephalic and atraumatic  Cardiovascular:      Rate and Rhythm: Normal rate  Heart sounds: Normal heart sounds  No murmur heard  Pulmonary:      Effort: Pulmonary effort is normal  No respiratory distress  Breath sounds: Normal breath sounds  Abdominal:      General: Bowel sounds are normal       Palpations: Abdomen is soft  Tenderness: There is no abdominal tenderness  There is no guarding or rebound  Hernia: No hernia is present  Musculoskeletal:         General: No deformity  Normal range of motion  Cervical back: Neck supple  Skin:     General: Skin is warm and dry  Findings: No rash  Neurological:      Mental Status: She is alert and oriented to person, place, and time  Cranial Nerves: No cranial nerve deficit

## 2022-05-31 NOTE — PATIENT INSTRUCTIONS
???? (??????????????????? ?????????? ???????)   ???????????? ??????:   ??????????????????? ?????????? ??????? (????) ??? ???????? ?????? (???????) ? ???????, ??????? ?????????? ????? 2 ??? ? ?????? ? ??????? ?????????? ??????  ??????? ????????, ??? ???? ? ??????? ?? ?????? ??????? ????????????? ????? ? ??? ???????  ???? ???? ?? ??????, ?? ? ???????? ??????? ??? ????? ???????? ? ?????? ????????????        ???????? ?????? ??????? ????: ???? ????? ??????????? ??-?? ????, ??? ?????? ????? (????????) ???????? ?? ????????? ??????? ???????  ? ?????????? ???????? ???????? ???????????, ????? ?????????? ???? ? ???????  ? ????? ???????????, ????? ???????? ???? ? ?????????? ??????? ? ???????  ???? ???????? ?? ??????????? ??????? ???????, ?????????? ??????? ? ???? ???????????? ??????? (?????? ????????) ? ???????  ????????? ???????? ????? ???????? ???? ???????? ????:  ???????????? ????????, ????? ??? ?????? ????, ???????, ????????, ?????????? ??????, ???? ???????? ? ??????? ????     ????? ??????????? ????????? (Hiatal Hernia)    ????????? ?????????, ????? ??? ????????? ?????????? ??????? (???????????? ??? ??????? ???????? ????????? ????????), ????????? ??? ??????? ????????, ?????????? ???????? ??? ???????????????     ????????????, ???????? ??? ????????????    ???????? ???????? ????? ???    ???????????? ???????? ??? ??????? ???????    ???????? ? ????????:  ?????? (?????? ???? ? ?????)    ???? ????? ?????? ????, ??????? ???????????????? ?? ???, ??????? ??? ?????    ????, ??????? ???????? ??? ????????? ????????? ????    ??????? ??? ?????? ???? ?? ???    ????? ??????    ??????????? ???????? ??? ???? ??? ????????    ?????????? ??? ???? ? ?????    ??????? ??? ?????    ???????? ??????? ?????? ????? ?????? ?????? ????    ????????? ? ?????? ?????????? ?????? (911 ? ???), ????:  ? ??? ?????? ???? ? ????? ? ????????? ??????????? ???????       ?????????? ??????????? ?? ??????? ? ????????? ???????:  ? ??? ?????????? ??????? ????? ???????? ?????     ?? ??????????? ??????????? ??? ???? ??? ????????     ???? ??????? ?????, ? ?????? ??? ??????? ?? ??????     ??????? ????? ?????? ?? ???????? ???? ??? ???????? ?????     ?????????? ? ????? ??? ????????????????, ????:  ?? ??????????, ??? ??? ??????? ??????????, ?? ?? ?????? ????????? ??? ??????? ?????     ? ??? ???????? ????? ??? ??? ????? ????     ?? ??????? ??? ??? ?????? ? ????? ???????     ???????? ??????????? ??? ?? ???????? ????? ?????? ????????????? ??????????     ? ??? ???? ??????? ??? ???????? ???????????? ?????? ????????? ??? ???????     ??????? ????:  ????????????? ???????? ???????????? ??? ?????????? ?????????? ?????????? ???????  ????????????? ???????? ????? ????? ?????????????? ??? ????, ????? ?????? ??????? ??????????? ????????? ? ??????? ??? ?????? ??????????? (???????)     ???????? ????????????? ???????? ???????????, ????? ???????? ??????? ????? ??????? ?????? ????????? ????????  ??? ???????? ???????? ???????? ? ????????????? ???????     ??????? ????:      ?? ?????????? ???? ? ?? ????? ???????, ??????? ????? ????????? ??????  ? ??? ????????? ???????, ?????? ???????, ???????? ??? ?????? ?????, ? ????? ???????, ?????????? ?????? ??? ???????????? ??????? (???????)  ? ?????? ????????? ????????? ?????? ????, ???, ???????? ? ???????? ?? ?????? ?????????  ?? ?????????? ???? ? ?? ????? ???????, ??????? ????? ??????? ??????????? ????????, ????????, ??????????, ???? ? ????????     ?? ??????????  ????? ???????? ?????????? ???? ?? ???? ??? ??????????? ?????????? ?????????? ???????, ??????????? ??? ?? ?????????????  ?????? ???????? ???? ??????? ???? ?????????? ???? ????? ??? ? ???? ????????? ? ?? ?????  ?? ?????????? ???? ? ?????? ?? 2-3 ???? ?? ???     ????????? ????????? ???????  ??? ????? ????????? ????? ??????? ????????? ????????????? ?????  ?? ?????? ???????????? ????????? ??????? ??? ?????? ? ????? ?? ????? ???     ????????????? ??????????? ???  ???? ? ??? ??????? ?????????? ???, ?? ??? ???????? ??????? ????????? ???????? ????     ?? ??????  ??????? ????? ? ?????????? ?????? ??????? ??????????? ????????? ? ???????? ??????????? ???????? ????  ??????? ? ?????? ???????? ?????, ??? ??? ??????, ???? ?? ?????? ?????? ? ??? ????? ??????, ????? ???????  ??????????? ???????? ? ????????? ???????? ???????? ???? ???????? ???????  ??????????????????? ? ??????? ??????, ?????? ??? ????????? ??? ?????????     ?? ?????? ?? ?????  ???????? ??????????? ??????? ? ??? ???????  ?? ?????? ??????, ??????? ?????? ?????????? ???? ?????  ?? ????????????  ???? ????? ???-?? ???????, ????????? ? ???????     ????????? ?????? ????? ??? ???????????????? ? ???????????? ? ??????????: ???????? ???? ???????, ????? ?? ?????? ?????? ?? ?? ????? ?????????   © Copyright [employer] 2022 Information is for End User's use only and may not be sold, redistributed or otherwise used for commercial purposes  All illustrations and images included in CareNotes® are the copyrighted property of [employer]  or [employer]  The above information is an  only  It is not intended as medical advice for individual conditions or treatments  Talk to your doctor, nurse or pharmacist before following any medical regimen to see if it is safe and effective for you

## 2022-06-22 ENCOUNTER — APPOINTMENT (OUTPATIENT)
Dept: LAB | Facility: CLINIC | Age: 47
End: 2022-06-22
Payer: COMMERCIAL

## 2022-06-22 DIAGNOSIS — K21.9 GASTROESOPHAGEAL REFLUX DISEASE WITHOUT ESOPHAGITIS: ICD-10-CM

## 2022-06-22 DIAGNOSIS — R10.13 EPIGASTRIC PAIN: ICD-10-CM

## 2022-06-22 PROCEDURE — 87338 HPYLORI STOOL AG IA: CPT

## 2022-06-23 LAB — H PYLORI AG STL QL IA: NEGATIVE

## 2022-06-27 ENCOUNTER — APPOINTMENT (OUTPATIENT)
Dept: LAB | Facility: HOSPITAL | Age: 47
End: 2022-06-27
Payer: COMMERCIAL

## 2022-06-27 DIAGNOSIS — E03.9 HYPOTHYROIDISM, UNSPECIFIED TYPE: ICD-10-CM

## 2022-06-27 DIAGNOSIS — E78.1 HYPERTRIGLYCERIDEMIA: ICD-10-CM

## 2022-06-27 DIAGNOSIS — E78.5 HYPERLIPIDEMIA, UNSPECIFIED HYPERLIPIDEMIA TYPE: ICD-10-CM

## 2022-06-27 LAB
CHOLEST SERPL-MCNC: 244 MG/DL
HDLC SERPL-MCNC: 47 MG/DL
LDLC SERPL CALC-MCNC: 147 MG/DL (ref 0–100)
NONHDLC SERPL-MCNC: 197 MG/DL
TRIGL SERPL-MCNC: 249 MG/DL
TSH SERPL DL<=0.05 MIU/L-ACNC: 4.09 UIU/ML (ref 0.45–4.5)

## 2022-06-27 PROCEDURE — 80061 LIPID PANEL: CPT

## 2022-06-27 PROCEDURE — 36415 COLL VENOUS BLD VENIPUNCTURE: CPT

## 2022-06-27 PROCEDURE — 84443 ASSAY THYROID STIM HORMONE: CPT

## 2022-06-29 ENCOUNTER — OFFICE VISIT (OUTPATIENT)
Dept: FAMILY MEDICINE CLINIC | Facility: CLINIC | Age: 47
End: 2022-06-29

## 2022-06-29 VITALS
BODY MASS INDEX: 36.68 KG/M2 | HEART RATE: 85 BPM | RESPIRATION RATE: 18 BRPM | OXYGEN SATURATION: 98 % | SYSTOLIC BLOOD PRESSURE: 110 MMHG | HEIGHT: 63 IN | DIASTOLIC BLOOD PRESSURE: 78 MMHG | WEIGHT: 207.02 LBS | TEMPERATURE: 96.9 F

## 2022-06-29 DIAGNOSIS — E03.9 HYPOTHYROIDISM, UNSPECIFIED TYPE: ICD-10-CM

## 2022-06-29 DIAGNOSIS — E78.5 HYPERLIPIDEMIA, UNSPECIFIED HYPERLIPIDEMIA TYPE: ICD-10-CM

## 2022-06-29 DIAGNOSIS — E11.69 TYPE 2 DIABETES MELLITUS WITH OTHER SPECIFIED COMPLICATION, WITHOUT LONG-TERM CURRENT USE OF INSULIN (HCC): ICD-10-CM

## 2022-06-29 DIAGNOSIS — K21.9 GASTROESOPHAGEAL REFLUX DISEASE WITHOUT ESOPHAGITIS: Primary | ICD-10-CM

## 2022-06-29 DIAGNOSIS — G43.919 INTRACTABLE MIGRAINE WITHOUT STATUS MIGRAINOSUS, UNSPECIFIED MIGRAINE TYPE: ICD-10-CM

## 2022-06-29 PROCEDURE — 99214 OFFICE O/P EST MOD 30 MIN: CPT

## 2022-06-29 PROCEDURE — 3008F BODY MASS INDEX DOCD: CPT | Performed by: FAMILY MEDICINE

## 2022-06-29 RX ORDER — OMEPRAZOLE 20 MG/1
20 CAPSULE, DELAYED RELEASE ORAL 2 TIMES DAILY
Qty: 180 CAPSULE | Refills: 0 | Status: SHIPPED | OUTPATIENT
Start: 2022-06-29

## 2022-06-29 RX ORDER — FAMOTIDINE 20 MG/1
20 TABLET, FILM COATED ORAL 2 TIMES DAILY
Qty: 180 TABLET | Refills: 1 | Status: SHIPPED | OUTPATIENT
Start: 2022-06-29

## 2022-06-29 RX ORDER — DULAGLUTIDE 0.75 MG/.5ML
0.75 INJECTION, SOLUTION SUBCUTANEOUS WEEKLY
Qty: 2 ML | Refills: 2 | Status: SHIPPED | OUTPATIENT
Start: 2022-06-29 | End: 2022-08-03 | Stop reason: SDUPTHER

## 2022-06-29 RX ORDER — TOPIRAMATE 50 MG/1
50 TABLET, FILM COATED ORAL 2 TIMES DAILY
Qty: 60 TABLET | Refills: 0 | Status: SHIPPED | OUTPATIENT
Start: 2022-06-29

## 2022-06-29 RX ORDER — ATORVASTATIN CALCIUM 40 MG/1
40 TABLET, FILM COATED ORAL DAILY
Qty: 90 TABLET | Refills: 1 | Status: SHIPPED | OUTPATIENT
Start: 2022-06-29

## 2022-06-29 RX ORDER — PANTOPRAZOLE SODIUM 20 MG/1
20 TABLET, DELAYED RELEASE ORAL
Qty: 30 TABLET | Refills: 5 | Status: SHIPPED | OUTPATIENT
Start: 2022-06-29 | End: 2022-06-29

## 2022-06-29 RX ORDER — LEVOTHYROXINE SODIUM 0.12 MG/1
125 TABLET ORAL DAILY
Qty: 30 TABLET | Refills: 1 | Status: SHIPPED | OUTPATIENT
Start: 2022-06-29

## 2022-06-29 NOTE — PROGRESS NOTES
Assessment/Plan:    Gastroesophageal reflux disease without esophagitis  -Start omeprazole 20 mg bid & prepcid 20 mg bid  -Referred to GI for possible EGD       Diagnoses and all orders for this visit:    Gastroesophageal reflux disease without esophagitis  -     Ambulatory Referral to Gastroenterology; Future  -     Discontinue: pantoprazole (PROTONIX) 20 mg tablet; Take 1 tablet (20 mg total) by mouth daily before breakfast  -     omeprazole (PriLOSEC) 20 mg delayed release capsule; Take 1 capsule (20 mg total) by mouth 2 (two) times a day  -     famotidine (PEPCID) 20 mg tablet; Take 1 tablet (20 mg total) by mouth 2 (two) times a day    Hyperlipidemia, unspecified hyperlipidemia type  -     atorvastatin (LIPITOR) 40 mg tablet; Take 1 tablet (40 mg total) by mouth daily    Type 2 diabetes mellitus with other specified complication, without long-term current use of insulin (HCC)  -     Dulaglutide (Trulicity) 1 54 RZ/7 5BU SOPN; Inject 0 5 mL (0 75 mg total) under the skin once a week  -     HEMOGLOBIN A1C W/ EAG ESTIMATION; Standing  -     HEMOGLOBIN A1C W/ EAG ESTIMATION    Hypothyroidism, unspecified type  -     levothyroxine 125 mcg tablet; Take 1 tablet (125 mcg total) by mouth daily    Intractable migraine without status migrainosus, unspecified migraine type  -     topiramate (Topamax) 50 MG tablet; Take 1 tablet (50 mg total) by mouth 2 (two) times a day          Subjective:      Patient ID: Steve Bhardwaj is a 40 y o  female  Steve Bhardwaj is a 40 y o  female  has a past medical history of Ankle swelling, Calf pain, Constipation, Diabetes mellitus ([address] ), Disease of thyroid gland, Double vision, GERD (gastroesophageal reflux disease), H/O: hysterectomy, Headache, History of hysterectomy, Hypertension, Joint pain, Numbness, Palpitations, Polycystic ovary syndrome, and SOB (shortness of breath)  has a past surgical history that includes Hysterectomy      She presents today for a GERD follow-up  She was tested for h pylori last month which was negative  She has tried ompreazole, carafate, protonix, and many others otc medications without relief  She has not tried splitting the dose to bid and has not tried more than one medication together  The following portions of the patient's history were reviewed and updated as appropriate: allergies, current medications, past family history, past medical history, past social history, past surgical history and problem list     Review of Systems   Constitutional: Negative for chills and fever  HENT: Negative for ear pain and sore throat  Eyes: Negative for pain and visual disturbance  Respiratory: Negative for cough and shortness of breath  Cardiovascular: Negative for chest pain and palpitations  Gastrointestinal: Positive for abdominal pain  Negative for vomiting         (+) heart burn   Genitourinary: Negative for dysuria and hematuria  Musculoskeletal: Negative for arthralgias and back pain  Skin: Negative for color change and rash  Neurological: Negative for seizures and syncope  All other systems reviewed and are negative  Objective:      /78 (BP Location: Right arm, Patient Position: Sitting, Cuff Size: Standard)   Pulse 85   Temp (!) 96 9 °F (36 1 °C) (Temporal)   Resp 18   Ht 5' 3" (1 6 m)   Wt 93 9 kg (207 lb 0 3 oz)   SpO2 98%   BMI 36 67 kg/m²          Physical Exam  Vitals and nursing note reviewed  Constitutional:       Appearance: She is obese  HENT:      Head: Normocephalic and atraumatic  Right Ear: External ear normal       Left Ear: External ear normal       Nose: Nose normal    Eyes:      Extraocular Movements: Extraocular movements intact  Conjunctiva/sclera: Conjunctivae normal       Pupils: Pupils are equal, round, and reactive to light  Cardiovascular:      Rate and Rhythm: Normal rate and regular rhythm  Pulses: Normal pulses  Heart sounds: Normal heart sounds  Pulmonary:      Effort: Pulmonary effort is normal       Breath sounds: Normal breath sounds  Abdominal:      General: Bowel sounds are normal       Palpations: Abdomen is soft  Tenderness: There is no abdominal tenderness  Musculoskeletal:         General: Normal range of motion  Cervical back: Normal range of motion  Skin:     General: Skin is warm and dry  Neurological:      General: No focal deficit present  Mental Status: She is alert and oriented to person, place, and time  Mental status is at baseline  Psychiatric:         Mood and Affect: Mood normal          Behavior: Behavior normal          Thought Content:  Thought content normal

## 2022-07-08 ENCOUNTER — CONSULT (OUTPATIENT)
Dept: GASTROENTEROLOGY | Facility: CLINIC | Age: 47
End: 2022-07-08
Payer: COMMERCIAL

## 2022-07-08 VITALS
DIASTOLIC BLOOD PRESSURE: 70 MMHG | WEIGHT: 206 LBS | SYSTOLIC BLOOD PRESSURE: 112 MMHG | HEIGHT: 63 IN | TEMPERATURE: 97.9 F | BODY MASS INDEX: 36.5 KG/M2

## 2022-07-08 DIAGNOSIS — Z12.11 SCREENING FOR COLORECTAL CANCER: ICD-10-CM

## 2022-07-08 DIAGNOSIS — R10.13 EPIGASTRIC PAIN: Primary | ICD-10-CM

## 2022-07-08 DIAGNOSIS — Z12.12 SCREENING FOR COLORECTAL CANCER: ICD-10-CM

## 2022-07-08 PROCEDURE — 99244 OFF/OP CNSLTJ NEW/EST MOD 40: CPT | Performed by: PHYSICIAN ASSISTANT

## 2022-07-08 PROCEDURE — 3008F BODY MASS INDEX DOCD: CPT

## 2022-07-08 PROCEDURE — 3074F SYST BP LT 130 MM HG: CPT | Performed by: PHYSICIAN ASSISTANT

## 2022-07-08 PROCEDURE — 3078F DIAST BP <80 MM HG: CPT | Performed by: PHYSICIAN ASSISTANT

## 2022-07-08 NOTE — PATIENT INSTRUCTIONS
Scheduled date of EGD/colonoscopy (as of today):  8/25/22  Physician performing EGD/colonoscopy:  Dr Antonietta Tran  Location of EGD/colonoscopy: [address]  Desired bowel prep reviewed with patient:  Miralax/MagCitr, diabetic  Instructions reviewed with patient by:   Myrtle  Clearances:   n/a

## 2022-07-08 NOTE — LETTER
July 8, 2022     Mariama Elliott MD  [id number]  [id number]    Patient: Odalis Henriquez   YOB: 1975   Date of Visit: 7/8/2022       Dear Dr Cat Stewart: Thank you for referring Odalis Henriquez to me for evaluation  Below are my notes for this consultation  If you have questions, please do not hesitate to call me  I look forward to following your patient along with you  Sincerely,        Sharon Hudson PA-C        CC: LUZ Hoyos PA-C  7/8/2022 11:06 AM  Sign when Signing Visit  [facility] - Outpatient Consultation  Odalis May 40 y o  female MRN: [id number]  Encounter: [id number]          ASSESSMENT AND PLAN:      1  Epigastric pain  She complains of postprandial epigastric pain radiating to her back  She does not take NSAIDs, and her pain has not responded to low-dose PPI therapy  We will schedule EGD to evaluate for erosive reflux disease, peptic ulcer disease, H pylori infection  We will obtain RUQ ultrasound to evaluate for cholelithiasis and check hepatic function panel  She may continue omeprazole 20 mg twice daily and Pepcid twice daily as needed  I reviewed in detail reflux diet and lifestyle precautions  This includes limiting coffee, soda, tomatoes, citrus, fatty and spicy foods  She would benefit from weight loss as well  - Ambulatory Referral to Gastroenterology  - Hepatic function panel; Future  - US right upper quadrant; Future  - EGD; Future    2  Screening for colorectal cancer  She turned 40 this year and should have colonoscopy for colorectal cancer screening  She is average risk and denies family history of colon cancer  I explained purpose of colonoscopy is to detect and remove polyps to prevent risk of cancer  I explained risks and benefits of the procedure   Risks include but are not limited to infection, bleeding, perforation, missed lesions  She expressed understanding and agrees to proceed  Gave instructions for MiraLax/Mag citrate prep  - Colonoscopy; Future    Follow-up after procedures  ______________________________________________________________________    HPI:  26-year-old female with obesity, diabetes mellitus, hypothyroidism, migraines, hyperlipidemia referred by PCP due to GERD  Patient is Arabic-speaking but her boyfriend is present and provides translation  She declines use of Realeyes   She reports postprandial epigastric pain radiating to her right upper quadrant and back for the past month  She also feels bloated and full  She denies heartburn, regurgitation, difficulty swallowing  She has tried multiple medications including omeprazole, Protonix, Carafate, other out over-the-counter medications without relief  She did test negative for H pylori via stool test but unclear if she was off PPI for at least 2 weeks prior to testing  She does not take NSAIDs  She has never had an EGD or colonoscopy  Surgical history significant for hysterectomy  She does not smoke and she drinks alcohol rarely  REVIEW OF SYSTEMS:    CONSTITUTIONAL: Denies any fever, chills, rigors, and weight loss  HEENT: No earache or tinnitus  Denies hearing loss or visual disturbances  CARDIOVASCULAR: No chest pain or palpitations  RESPIRATORY: Denies any cough, hemoptysis, shortness of breath or dyspnea on exertion  GASTROINTESTINAL: As noted in the History of Present Illness  GENITOURINARY: No problems with urination  Denies any hematuria or dysuria  NEUROLOGIC: No dizziness or vertigo, denies headaches  MUSCULOSKELETAL: Denies any muscle or joint pain  SKIN: Denies skin rashes or itching  ENDOCRINE: Denies excessive thirst  Denies intolerance to heat or cold  PSYCHOSOCIAL: Denies depression or anxiety  Denies any recent memory loss         Historical Information   Past Medical History:   Diagnosis Date    Ankle swelling     Calf pain     with walking    Constipation     Diabetes mellitus ([address] )     Disease of thyroid gland     Double vision     GERD (gastroesophageal reflux disease)     H/O: hysterectomy     Headache     History of hysterectomy 9/9/2021    Hypertension     Joint pain     Numbness     Palpitations     Polycystic ovary syndrome     SOB (shortness of breath)      Past Surgical History:   Procedure Laterality Date    HYSTERECTOMY       Social History   Social History     Substance and Sexual Activity   Alcohol Use Yes    Comment: rare     Social History     Substance and Sexual Activity   Drug Use Never     Social History     Tobacco Use   Smoking Status Never Smoker   Smokeless Tobacco Never Used     Family History   Problem Relation Age of Onset    Hypertension Mother     Heart disease Mother     Diabetes Mother     Thyroid disease Mother     Thyroid disease Father     Diabetes Father     Hypertension Father     Hypertension Sister     Heart attack Sister     Diabetes Sister     Heart disease Sister     Hypertension Brother     Heart disease Brother     Heart attack Brother     Diabetes Brother        Meds/Allergies       Current Outpatient Medications:     atorvastatin (LIPITOR) 40 mg tablet    Dulaglutide (Trulicity) 3 54 BZ/6 5LA SOPN    famotidine (PEPCID) 20 mg tablet    fenofibrate (TRICOR) 48 mg tablet    furosemide (LASIX) 20 mg tablet    levothyroxine 125 mcg tablet    loratadine (CLARITIN) 10 mg tablet    melatonin 3 mg    omeprazole (PriLOSEC) 20 mg delayed release capsule    topiramate (Topamax) 50 MG tablet    Allergies   Allergen Reactions    Penicillins Rash           Objective     Blood pressure 112/70, temperature 97 9 °F (36 6 °C), temperature source Tympanic, height 5' 3" (1 6 m), weight 93 4 kg (206 lb), not currently breastfeeding  Body mass index is 36 49 kg/m²          PHYSICAL EXAM:      General Appearance:   Alert, cooperative, no distress HEENT:   Normocephalic, atraumatic, anicteric      Neck:  Supple, symmetrical, trachea midline   Lungs:   Clear to auscultation bilaterally; no rales, rhonchi or wheezing; respirations unlabored    Heart[de-identified]   Regular rate and rhythm; no murmur, rub, or gallop  Abdomen:   Soft, mild epigastric tenderness, non-distended; normal bowel sounds; no masses, no organomegaly    Genitalia:   Deferred    Rectal:   Deferred    Extremities:  No cyanosis, clubbing or edema    Pulses:  2+ and symmetric    Skin:  No jaundice, rashes, or lesions    Lymph nodes:  No palpable cervical lymphadenopathy        Lab Results:   No visits with results within 1 Day(s) from this visit  Latest known visit with results is:   Appointment on 06/27/2022   Component Date Value    TSH 3RD GENERATON 06/27/2022 4 095     Cholesterol 06/27/2022 244 (A)    Triglycerides 06/27/2022 249 (A)    HDL, Direct 06/27/2022 47 (A)    LDL Calculated 06/27/2022 147 (A)    Non-HDL-Chol (CHOL-HDL) 06/27/2022 197          Radiology Results:   No results found

## 2022-07-08 NOTE — PROGRESS NOTES
[facility] - Outpatient Consultation  Elliot Flynn 40 y o  female MRN: [id number]  Encounter: [id number]          ASSESSMENT AND PLAN:      1  Epigastric pain  She complains of postprandial epigastric pain radiating to her back  She does not take NSAIDs, and her pain has not responded to low-dose PPI therapy  We will schedule EGD to evaluate for erosive reflux disease, peptic ulcer disease, H pylori infection  We will obtain RUQ ultrasound to evaluate for cholelithiasis and check hepatic function panel  She may continue omeprazole 20 mg twice daily and Pepcid twice daily as needed  I reviewed in detail reflux diet and lifestyle precautions  This includes limiting coffee, soda, tomatoes, citrus, fatty and spicy foods  She would benefit from weight loss as well  - Ambulatory Referral to Gastroenterology  - Hepatic function panel; Future  - US right upper quadrant; Future  - EGD; Future    2  Screening for colorectal cancer  She turned 40 this year and should have colonoscopy for colorectal cancer screening  She is average risk and denies family history of colon cancer  I explained purpose of colonoscopy is to detect and remove polyps to prevent risk of cancer  I explained risks and benefits of the procedure  Risks include but are not limited to infection, bleeding, perforation, missed lesions  She expressed understanding and agrees to proceed  Gave instructions for MiraLax/Mag citrate prep  - Colonoscopy; Future    Follow-up after procedures  ______________________________________________________________________    HPI:  18-year-old female with obesity, diabetes mellitus, hypothyroidism, migraines, hyperlipidemia referred by PCP due to GERD  Patient is Swedish-speaking but her boyfriend is present and provides translation  She declines use of Spherix       She reports postprandial epigastric pain radiating to her right upper quadrant and back for the past month  She also feels bloated and full  She denies heartburn, regurgitation, difficulty swallowing  She has tried multiple medications including omeprazole, Protonix, Carafate, other out over-the-counter medications without relief  She did test negative for H pylori via stool test but unclear if she was off PPI for at least 2 weeks prior to testing  She does not take NSAIDs  She has never had an EGD or colonoscopy  Surgical history significant for hysterectomy  She does not smoke and she drinks alcohol rarely  REVIEW OF SYSTEMS:    CONSTITUTIONAL: Denies any fever, chills, rigors, and weight loss  HEENT: No earache or tinnitus  Denies hearing loss or visual disturbances  CARDIOVASCULAR: No chest pain or palpitations  RESPIRATORY: Denies any cough, hemoptysis, shortness of breath or dyspnea on exertion  GASTROINTESTINAL: As noted in the History of Present Illness  GENITOURINARY: No problems with urination  Denies any hematuria or dysuria  NEUROLOGIC: No dizziness or vertigo, denies headaches  MUSCULOSKELETAL: Denies any muscle or joint pain  SKIN: Denies skin rashes or itching  ENDOCRINE: Denies excessive thirst  Denies intolerance to heat or cold  PSYCHOSOCIAL: Denies depression or anxiety  Denies any recent memory loss         Historical Information   Past Medical History:   Diagnosis Date    Ankle swelling     Calf pain     with walking    Constipation     Diabetes mellitus ([address] )     Disease of thyroid gland     Double vision     GERD (gastroesophageal reflux disease)     H/O: hysterectomy     Headache     History of hysterectomy 9/9/2021    Hypertension     Joint pain     Numbness     Palpitations     Polycystic ovary syndrome     SOB (shortness of breath)      Past Surgical History:   Procedure Laterality Date    HYSTERECTOMY       Social History   Social History     Substance and Sexual Activity   Alcohol Use Yes    Comment: rare     Social History     Substance and Sexual Activity   Drug Use Never     Social History     Tobacco Use   Smoking Status Never Smoker   Smokeless Tobacco Never Used     Family History   Problem Relation Age of Onset    Hypertension Mother     Heart disease Mother     Diabetes Mother     Thyroid disease Mother     Thyroid disease Father     Diabetes Father     Hypertension Father     Hypertension Sister     Heart attack Sister     Diabetes Sister     Heart disease Sister     Hypertension Brother     Heart disease Brother     Heart attack Brother     Diabetes Brother        Meds/Allergies       Current Outpatient Medications:     atorvastatin (LIPITOR) 40 mg tablet    Dulaglutide (Trulicity) 4 79 QZ/5 4GA SOPN    famotidine (PEPCID) 20 mg tablet    fenofibrate (TRICOR) 48 mg tablet    furosemide (LASIX) 20 mg tablet    levothyroxine 125 mcg tablet    loratadine (CLARITIN) 10 mg tablet    melatonin 3 mg    omeprazole (PriLOSEC) 20 mg delayed release capsule    topiramate (Topamax) 50 MG tablet    Allergies   Allergen Reactions    Penicillins Rash           Objective     Blood pressure 112/70, temperature 97 9 °F (36 6 °C), temperature source Tympanic, height 5' 3" (1 6 m), weight 93 4 kg (206 lb), not currently breastfeeding  Body mass index is 36 49 kg/m²  PHYSICAL EXAM:      General Appearance:   Alert, cooperative, no distress   HEENT:   Normocephalic, atraumatic, anicteric      Neck:  Supple, symmetrical, trachea midline   Lungs:   Clear to auscultation bilaterally; no rales, rhonchi or wheezing; respirations unlabored    Heart[de-identified]   Regular rate and rhythm; no murmur, rub, or gallop     Abdomen:   Soft, mild epigastric tenderness, non-distended; normal bowel sounds; no masses, no organomegaly    Genitalia:   Deferred    Rectal:   Deferred    Extremities:  No cyanosis, clubbing or edema    Pulses:  2+ and symmetric    Skin:  No jaundice, rashes, or lesions    Lymph nodes:  No palpable cervical lymphadenopathy        Lab Results:   No visits with results within 1 Day(s) from this visit  Latest known visit with results is:   Appointment on 06/27/2022   Component Date Value    TSH 3RD GENERATON 06/27/2022 4 095     Cholesterol 06/27/2022 244 (A)    Triglycerides 06/27/2022 249 (A)    HDL, Direct 06/27/2022 47 (A)    LDL Calculated 06/27/2022 147 (A)    Non-HDL-Chol (CHOL-HDL) 06/27/2022 197          Radiology Results:   No results found

## 2022-08-03 DIAGNOSIS — E11.69 TYPE 2 DIABETES MELLITUS WITH OTHER SPECIFIED COMPLICATION, WITHOUT LONG-TERM CURRENT USE OF INSULIN (HCC): ICD-10-CM

## 2022-08-04 RX ORDER — DULAGLUTIDE 0.75 MG/.5ML
0.75 INJECTION, SOLUTION SUBCUTANEOUS WEEKLY
Qty: 2 ML | Refills: 0 | Status: SHIPPED | OUTPATIENT
Start: 2022-08-04 | End: 2022-09-22 | Stop reason: SDUPTHER

## 2022-08-24 RX ORDER — SODIUM CHLORIDE, SODIUM LACTATE, POTASSIUM CHLORIDE, CALCIUM CHLORIDE 600; 310; 30; 20 MG/100ML; MG/100ML; MG/100ML; MG/100ML
75 INJECTION, SOLUTION INTRAVENOUS CONTINUOUS
Status: CANCELLED | OUTPATIENT
Start: 2022-08-24

## 2022-08-25 ENCOUNTER — HOSPITAL ENCOUNTER (OUTPATIENT)
Dept: GASTROENTEROLOGY | Facility: AMBULARY SURGERY CENTER | Age: 47
Setting detail: OUTPATIENT SURGERY
End: 2022-08-25
Payer: COMMERCIAL

## 2022-08-25 ENCOUNTER — ANESTHESIA (OUTPATIENT)
Dept: GASTROENTEROLOGY | Facility: AMBULARY SURGERY CENTER | Age: 47
End: 2022-08-25

## 2022-08-25 ENCOUNTER — ANESTHESIA EVENT (OUTPATIENT)
Dept: GASTROENTEROLOGY | Facility: AMBULARY SURGERY CENTER | Age: 47
End: 2022-08-25

## 2022-08-25 VITALS
SYSTOLIC BLOOD PRESSURE: 106 MMHG | OXYGEN SATURATION: 100 % | DIASTOLIC BLOOD PRESSURE: 66 MMHG | RESPIRATION RATE: 18 BRPM | TEMPERATURE: 97.7 F | HEART RATE: 73 BPM

## 2022-08-25 DIAGNOSIS — R10.13 EPIGASTRIC PAIN: ICD-10-CM

## 2022-08-25 DIAGNOSIS — Z12.11 SCREENING FOR COLORECTAL CANCER: ICD-10-CM

## 2022-08-25 DIAGNOSIS — Z12.12 SCREENING FOR COLORECTAL CANCER: ICD-10-CM

## 2022-08-25 LAB — GLUCOSE SERPL-MCNC: 99 MG/DL (ref 65–140)

## 2022-08-25 PROCEDURE — 88305 TISSUE EXAM BY PATHOLOGIST: CPT | Performed by: STUDENT IN AN ORGANIZED HEALTH CARE EDUCATION/TRAINING PROGRAM

## 2022-08-25 PROCEDURE — 43239 EGD BIOPSY SINGLE/MULTIPLE: CPT | Performed by: INTERNAL MEDICINE

## 2022-08-25 PROCEDURE — G0121 COLON CA SCRN NOT HI RSK IND: HCPCS | Performed by: INTERNAL MEDICINE

## 2022-08-25 PROCEDURE — 82948 REAGENT STRIP/BLOOD GLUCOSE: CPT

## 2022-08-25 RX ORDER — SODIUM CHLORIDE, SODIUM LACTATE, POTASSIUM CHLORIDE, CALCIUM CHLORIDE 600; 310; 30; 20 MG/100ML; MG/100ML; MG/100ML; MG/100ML
75 INJECTION, SOLUTION INTRAVENOUS CONTINUOUS
Status: DISCONTINUED | OUTPATIENT
Start: 2022-08-25 | End: 2022-08-29 | Stop reason: HOSPADM

## 2022-08-25 RX ORDER — PROPOFOL 10 MG/ML
INJECTION, EMULSION INTRAVENOUS AS NEEDED
Status: DISCONTINUED | OUTPATIENT
Start: 2022-08-25 | End: 2022-08-25

## 2022-08-25 RX ORDER — LIDOCAINE HYDROCHLORIDE 20 MG/ML
INJECTION, SOLUTION EPIDURAL; INFILTRATION; INTRACAUDAL; PERINEURAL AS NEEDED
Status: DISCONTINUED | OUTPATIENT
Start: 2022-08-25 | End: 2022-08-25

## 2022-08-25 RX ORDER — SODIUM CHLORIDE, SODIUM LACTATE, POTASSIUM CHLORIDE, CALCIUM CHLORIDE 600; 310; 30; 20 MG/100ML; MG/100ML; MG/100ML; MG/100ML
INJECTION, SOLUTION INTRAVENOUS CONTINUOUS PRN
Status: DISCONTINUED | OUTPATIENT
Start: 2022-08-25 | End: 2022-08-25

## 2022-08-25 RX ADMIN — PROPOFOL 100 MG: 10 INJECTION, EMULSION INTRAVENOUS at 10:24

## 2022-08-25 RX ADMIN — PROPOFOL 50 MG: 10 INJECTION, EMULSION INTRAVENOUS at 10:36

## 2022-08-25 RX ADMIN — PROPOFOL 50 MG: 10 INJECTION, EMULSION INTRAVENOUS at 10:27

## 2022-08-25 RX ADMIN — SODIUM CHLORIDE, SODIUM LACTATE, POTASSIUM CHLORIDE, AND CALCIUM CHLORIDE: .6; .31; .03; .02 INJECTION, SOLUTION INTRAVENOUS at 09:26

## 2022-08-25 RX ADMIN — PROPOFOL 50 MG: 10 INJECTION, EMULSION INTRAVENOUS at 10:39

## 2022-08-25 RX ADMIN — PROPOFOL 50 MG: 10 INJECTION, EMULSION INTRAVENOUS at 10:30

## 2022-08-25 RX ADMIN — LIDOCAINE HYDROCHLORIDE 100 MG: 20 INJECTION, SOLUTION EPIDURAL; INFILTRATION; INTRACAUDAL; PERINEURAL at 10:24

## 2022-08-25 RX ADMIN — PROPOFOL 50 MG: 10 INJECTION, EMULSION INTRAVENOUS at 10:33

## 2022-08-25 NOTE — ANESTHESIA PREPROCEDURE EVALUATION
Procedure:  EGD  COLONOSCOPY    Relevant Problems   CARDIO   (+) Hyperlipidemia   (+) Intractable migraine without status migrainosus      ENDO   (+) Diabetes mellitus, type 2 (HCC)   (+) Hypothyroidism      GI/HEPATIC   (+) Gastroesophageal reflux disease without esophagitis      GYN   (+) History of hysterectomy      NEURO/PSYCH   (+) Intractable migraine without status migrainosus        Physical Exam    Airway    Mallampati score: III  TM Distance: >3 FB  Neck ROM: full     Dental       Cardiovascular  Rhythm: regular, Rate: normal,     Pulmonary  Breath sounds clear to auscultation,     Other Findings        Anesthesia Plan  ASA Score- 2     Anesthesia Type- IV sedation with anesthesia with ASA Monitors  Additional Monitors:   Airway Plan:           Plan Factors-    Chart reviewed  Patient is not a current smoker  Induction- intravenous  Postoperative Plan-     Informed Consent- Anesthetic plan and risks discussed with patient  I personally reviewed this patient with the CRNA  Discussed and agreed on the Anesthesia Plan with the CRNA  Refugio Carson

## 2022-08-25 NOTE — H&P
History and Physical -  [facility]  Roel Chao 40 y o  female MRN: [id number]                  HPI: Roel Chao is a 40y o  year old female who presents for EGD/colonoscopy      REVIEW OF SYSTEMS: Per the HPI, and otherwise unremarkable      Historical Information   Past Medical History:   Diagnosis Date    Ankle swelling     Calf pain     with walking    Constipation     Diabetes mellitus ([address] )     Disease of thyroid gland     Double vision     GERD (gastroesophageal reflux disease)     H/O: hysterectomy     Headache     History of hysterectomy 9/9/2021    Hypertension     Joint pain     Numbness     Palpitations     Polycystic ovary syndrome     SOB (shortness of breath)      Past Surgical History:   Procedure Laterality Date    HYSTERECTOMY       Social History   Social History     Substance and Sexual Activity   Alcohol Use Yes    Comment: rare     Social History     Substance and Sexual Activity   Drug Use Never     Social History     Tobacco Use   Smoking Status Never Smoker   Smokeless Tobacco Never Used     Family History   Problem Relation Age of Onset    Hypertension Mother     Heart disease Mother     Diabetes Mother     Thyroid disease Mother     Thyroid disease Father     Diabetes Father     Hypertension Father     Hypertension Sister     Heart attack Sister     Diabetes Sister     Heart disease Sister     Hypertension Brother     Heart disease Brother     Heart attack Brother     Diabetes Brother        Meds/Allergies       Current Outpatient Medications:     atorvastatin (LIPITOR) 40 mg tablet    Dulaglutide (Trulicity) 0 94 UJ/5 3AM SOPN    famotidine (PEPCID) 20 mg tablet    fenofibrate (TRICOR) 48 mg tablet    furosemide (LASIX) 20 mg tablet    levothyroxine 125 mcg tablet    omeprazole (PriLOSEC) 20 mg delayed release capsule    topiramate (Topamax) 50 MG tablet    loratadine (CLARITIN) 10 mg tablet   melatonin 3 mg    Current Facility-Administered Medications:     lactated ringers infusion, 75 mL/hr, Intravenous, Continuous    Allergies   Allergen Reactions    Penicillins Rash       Objective     There were no vitals taken for this visit  PHYSICAL EXAM    Gen: NAD  Head: NCAT  CV: RRR  CHEST: Clear  ABD: soft, NT/ND  EXT: no edema      ASSESSMENT/PLAN:  This is a 40y o  year old female here for egd/colonoscopy, and she is stable and optimized for her procedure

## 2022-08-25 NOTE — ANESTHESIA POSTPROCEDURE EVALUATION
Post-Op Assessment Note    CV Status:  Stable  Pain Score: 0    Pain management: adequate     Mental Status:  Alert and awake   Hydration Status:  Euvolemic   PONV Controlled:  Controlled   Airway Patency:  Patent      Post Op Vitals Reviewed: Yes      Staff: Anesthesiologist, CRNA         No complications documented      BP   118/67   Temp   97 7   Pulse  80   Resp   16   SpO2   96%

## 2022-08-30 PROCEDURE — 88305 TISSUE EXAM BY PATHOLOGIST: CPT | Performed by: STUDENT IN AN ORGANIZED HEALTH CARE EDUCATION/TRAINING PROGRAM

## 2022-09-20 ENCOUNTER — APPOINTMENT (OUTPATIENT)
Dept: LAB | Facility: HOSPITAL | Age: 47
End: 2022-09-20
Payer: COMMERCIAL

## 2022-09-20 DIAGNOSIS — R10.13 EPIGASTRIC PAIN: ICD-10-CM

## 2022-09-20 LAB
ALBUMIN SERPL BCP-MCNC: 3.9 G/DL (ref 3.5–5)
ALP SERPL-CCNC: 70 U/L (ref 46–116)
ALT SERPL W P-5'-P-CCNC: 37 U/L (ref 12–78)
AST SERPL W P-5'-P-CCNC: 21 U/L (ref 5–45)
BILIRUB DIRECT SERPL-MCNC: 0.05 MG/DL (ref 0–0.2)
BILIRUB SERPL-MCNC: 0.21 MG/DL (ref 0.2–1)
PROT SERPL-MCNC: 7.9 G/DL (ref 6.4–8.4)

## 2022-09-20 PROCEDURE — 80076 HEPATIC FUNCTION PANEL: CPT

## 2022-09-22 DIAGNOSIS — E11.69 TYPE 2 DIABETES MELLITUS WITH OTHER SPECIFIED COMPLICATION, WITHOUT LONG-TERM CURRENT USE OF INSULIN (HCC): ICD-10-CM

## 2022-09-26 RX ORDER — DULAGLUTIDE 0.75 MG/.5ML
0.75 INJECTION, SOLUTION SUBCUTANEOUS WEEKLY
Qty: 2 ML | Refills: 0 | Status: SHIPPED | OUTPATIENT
Start: 2022-09-26 | End: 2022-09-27 | Stop reason: SDUPTHER

## 2022-09-27 ENCOUNTER — OFFICE VISIT (OUTPATIENT)
Dept: FAMILY MEDICINE CLINIC | Facility: CLINIC | Age: 47
End: 2022-09-27

## 2022-09-27 VITALS
OXYGEN SATURATION: 98 % | BODY MASS INDEX: 37.92 KG/M2 | HEIGHT: 63 IN | DIASTOLIC BLOOD PRESSURE: 80 MMHG | RESPIRATION RATE: 18 BRPM | SYSTOLIC BLOOD PRESSURE: 128 MMHG | WEIGHT: 214 LBS | HEART RATE: 85 BPM | TEMPERATURE: 96.7 F

## 2022-09-27 DIAGNOSIS — E11.69 TYPE 2 DIABETES MELLITUS WITH OTHER SPECIFIED COMPLICATION, WITHOUT LONG-TERM CURRENT USE OF INSULIN (HCC): ICD-10-CM

## 2022-09-27 DIAGNOSIS — K21.9 GASTROESOPHAGEAL REFLUX DISEASE WITHOUT ESOPHAGITIS: Primary | ICD-10-CM

## 2022-09-27 DIAGNOSIS — K59.00 CONSTIPATION, UNSPECIFIED CONSTIPATION TYPE: ICD-10-CM

## 2022-09-27 DIAGNOSIS — E66.9 OBESITY (BMI 30-39.9): ICD-10-CM

## 2022-09-27 DIAGNOSIS — E03.9 HYPOTHYROIDISM, UNSPECIFIED TYPE: ICD-10-CM

## 2022-09-27 DIAGNOSIS — E78.5 HYPERLIPIDEMIA, UNSPECIFIED HYPERLIPIDEMIA TYPE: ICD-10-CM

## 2022-09-27 DIAGNOSIS — G43.919 INTRACTABLE MIGRAINE WITHOUT STATUS MIGRAINOSUS, UNSPECIFIED MIGRAINE TYPE: ICD-10-CM

## 2022-09-27 DIAGNOSIS — M62.838 MUSCLE SPASMS OF NECK: ICD-10-CM

## 2022-09-27 PROCEDURE — 99214 OFFICE O/P EST MOD 30 MIN: CPT | Performed by: NURSE PRACTITIONER

## 2022-09-27 PROCEDURE — 3079F DIAST BP 80-89 MM HG: CPT | Performed by: NURSE PRACTITIONER

## 2022-09-27 PROCEDURE — 3074F SYST BP LT 130 MM HG: CPT | Performed by: NURSE PRACTITIONER

## 2022-09-27 RX ORDER — METHOCARBAMOL 500 MG/1
500 TABLET, FILM COATED ORAL 2 TIMES DAILY PRN
Qty: 40 TABLET | Refills: 0 | Status: SHIPPED | OUTPATIENT
Start: 2022-09-27

## 2022-09-27 RX ORDER — DULAGLUTIDE 0.75 MG/.5ML
0.75 INJECTION, SOLUTION SUBCUTANEOUS WEEKLY
Qty: 2 ML | Refills: 0 | Status: SHIPPED | OUTPATIENT
Start: 2022-09-27 | End: 2022-09-27 | Stop reason: SDUPTHER

## 2022-09-27 RX ORDER — POLYETHYLENE GLYCOL 3350 17 G/17G
17 POWDER, FOR SOLUTION ORAL 2 TIMES DAILY
Qty: 578 G | Refills: 0 | Status: SHIPPED | OUTPATIENT
Start: 2022-09-27

## 2022-09-27 RX ORDER — DULAGLUTIDE 0.75 MG/.5ML
0.75 INJECTION, SOLUTION SUBCUTANEOUS WEEKLY
Qty: 2 ML | Refills: 0 | Status: SHIPPED | OUTPATIENT
Start: 2022-09-27

## 2022-09-27 NOTE — PROGRESS NOTES
Assessment/Plan:    Hypothyroidism  Recheck TSH level   Continue with levothyroxine 125 mcg daily     Diabetes mellitus, type 2 (HCC)    Lab Results   Component Value Date    HGBA1C 5 6 09/20/2022     Continue with Trulicity 7 09 mg weekly   Referral for eye exam     Intractable migraine without status migrainosus  Chronic, suspect neck and shoulder tension contributing to sx   Will continue with Topamax 50 mg bid and trial muscle relaxer PRN   Would recommend also having eyes checked and labs done     [facility] was [facility] for follow[facility]up  Diagnoses and all orders for this visit:    Gastroesophageal reflux disease without esophagitis    Hypothyroidism, unspecified type  -     TSH, 3rd generation with Free T4 reflex; Future    Type 2 diabetes mellitus with other specified complication, without long-term current use of insulin (HCC)  -     Discontinue: Dulaglutide (Trulicity) 9 04 AR/1 3ID SOPN; Inject 0 5 mL (0 75 mg total) under the skin once a week  -     Microalbumin / creatinine urine ratio  -     Ambulatory Referral to Ophthalmology; Future  -     Dulaglutide (Trulicity) 5 39 CS/7 7KR SOPN; Inject 0 5 mL (0 75 mg total) under the skin once a week    Intractable migraine without status migrainosus, unspecified migraine type  -     CBC and differential; Future  -     Comprehensive metabolic panel; Future  -     Ambulatory Referral to Ophthalmology; Future    Hyperlipidemia, unspecified hyperlipidemia type  -     Lipid panel; Future    Obesity (BMI 30-39 9)  -     Vitamin D 25 hydroxy; Future    Muscle spasms of neck  -     methocarbamol (ROBAXIN) 500 mg tablet; Take 1 tablet (500 mg total) by mouth 2 (two) times a day as needed for muscle spasms    Constipation, unspecified constipation type  -     polyethylene glycol (GLYCOLAX) 17 GM/SCOOP powder; Take 17 g by mouth 2 (two) times a day        Return in about 8 weeks (around 11/22/2022) for headaches           Subjective:     Hebert Washington is a 40 y o  female who  has a past medical history of Ankle swelling, Calf pain, Constipation, Diabetes mellitus ([address] ), Disease of thyroid gland, Double vision, GERD (gastroesophageal reflux disease), H/O: hysterectomy, Headache, History of hysterectomy, Hypertension, Joint pain, Numbness, Palpitations, Polycystic ovary syndrome, and SOB (shortness of breath)  who presented to the office today for follow up  The following portions of the patient's history were reviewed and updated as appropriate: allergies, current medications, past family history, past medical history, past social history, past surgical history and problem list     Current Outpatient Medications on File Prior to Visit   Medication Sig Dispense Refill    atorvastatin (LIPITOR) 40 mg tablet Take 1 tablet (40 mg total) by mouth daily 90 tablet 1    famotidine (PEPCID) 20 mg tablet Take 1 tablet (20 mg total) by mouth 2 (two) times a day 180 tablet 1    fenofibrate (TRICOR) 48 mg tablet Take 1 tablet (48 mg total) by mouth daily 30 tablet 1    furosemide (LASIX) 20 mg tablet Take 1 tablet (20 mg total) by mouth daily as needed (hand swelling) 15 tablet 0    levothyroxine 125 mcg tablet Take 1 tablet (125 mcg total) by mouth daily 30 tablet 1    loratadine (CLARITIN) 10 mg tablet Take 1 tablet (10 mg total) by mouth daily 30 tablet 1    melatonin 3 mg Take 1 tablet (3 mg total) by mouth daily at bedtime 30 tablet 0    omeprazole (PriLOSEC) 20 mg delayed release capsule Take 1 capsule (20 mg total) by mouth 2 (two) times a day 180 capsule 0    topiramate (Topamax) 50 MG tablet Take 1 tablet (50 mg total) by mouth 2 (two) times a day 60 tablet 0     No current facility-administered medications on file prior to visit  Review of Systems   Constitutional: Negative for chills and fever  HENT: Negative for ear pain and sore throat  Eyes: Negative for pain and visual disturbance  Respiratory: Negative for cough and shortness of breath  Cardiovascular: Negative for chest pain and palpitations  Gastrointestinal: Negative for abdominal pain and vomiting  Genitourinary: Negative for dysuria and hematuria  Musculoskeletal: Negative for arthralgias and back pain  Skin: Negative for color change and rash  Neurological: Positive for headaches  Negative for seizures and syncope  All other systems reviewed and are negative  BMI Counseling: Body mass index is 37 91 kg/m²  The BMI is above normal  Nutrition recommendations include decreasing fast food intake, consuming healthier snacks and limiting drinks that contain sugar  Rationale for BMI follow-up plan is due to patient being overweight or obese  Objective:    /80 (BP Location: Left arm, Patient Position: Sitting, Cuff Size: Standard)   Pulse 85   Temp (!) 96 7 °F (35 9 °C) (Temporal)   Resp 18   Ht 5' 3" (1 6 m)   Wt 97 1 kg (214 lb)   SpO2 98%   Breastfeeding Yes   BMI 37 91 kg/m²     Physical Exam  Vitals and nursing note reviewed  Constitutional:       General: She is not in acute distress  Appearance: She is well-developed  She is not diaphoretic  HENT:      Head: Normocephalic and atraumatic  Left Ear: External ear normal    Eyes:      Conjunctiva/sclera: Conjunctivae normal       Pupils: Pupils are equal, round, and reactive to light  Cardiovascular:      Rate and Rhythm: Normal rate and regular rhythm  Pulmonary:      Effort: Pulmonary effort is normal  No respiratory distress  Breath sounds: Normal breath sounds  No wheezing  Abdominal:      General: Bowel sounds are normal  There is no distension  Palpations: Abdomen is soft  Tenderness: There is no abdominal tenderness  Musculoskeletal:         General: No deformity  Normal range of motion  Cervical back: Normal range of motion and neck supple  Lymphadenopathy:      Cervical: No cervical adenopathy  Skin:     General: Skin is warm and dry        Capillary Refill: Capillary refill takes less than 2 seconds  Findings: No rash  Neurological:      General: No focal deficit present  Mental Status: She is alert and oriented to person, place, and time  Cranial Nerves: No cranial nerve deficit  Sensory: No sensory deficit  Motor: No weakness        Coordination: Coordination normal       Gait: Gait normal       Deep Tendon Reflexes: Reflexes normal    Psychiatric:         Behavior: Behavior normal          Tamara Chambers  09/28/22  12:24 PM

## 2022-09-28 ENCOUNTER — APPOINTMENT (OUTPATIENT)
Dept: LAB | Facility: HOSPITAL | Age: 47
End: 2022-09-28
Payer: COMMERCIAL

## 2022-09-28 DIAGNOSIS — E03.9 HYPOTHYROIDISM, UNSPECIFIED TYPE: ICD-10-CM

## 2022-09-28 DIAGNOSIS — E78.5 HYPERLIPIDEMIA, UNSPECIFIED HYPERLIPIDEMIA TYPE: ICD-10-CM

## 2022-09-28 DIAGNOSIS — E66.9 OBESITY (BMI 30-39.9): ICD-10-CM

## 2022-09-28 DIAGNOSIS — G43.919 INTRACTABLE MIGRAINE WITHOUT STATUS MIGRAINOSUS, UNSPECIFIED MIGRAINE TYPE: ICD-10-CM

## 2022-09-28 LAB
25(OH)D3 SERPL-MCNC: 19.2 NG/ML (ref 30–100)
ALBUMIN SERPL BCP-MCNC: 3.7 G/DL (ref 3.5–5)
ALP SERPL-CCNC: 74 U/L (ref 46–116)
ALT SERPL W P-5'-P-CCNC: 42 U/L (ref 12–78)
ANION GAP SERPL CALCULATED.3IONS-SCNC: 7 MMOL/L (ref 4–13)
AST SERPL W P-5'-P-CCNC: 20 U/L (ref 5–45)
BASOPHILS # BLD AUTO: 0.03 THOUSANDS/ÂΜL (ref 0–0.1)
BASOPHILS NFR BLD AUTO: 0 % (ref 0–1)
BILIRUB SERPL-MCNC: 0.18 MG/DL (ref 0.2–1)
BUN SERPL-MCNC: 16 MG/DL (ref 5–25)
CALCIUM SERPL-MCNC: 9.4 MG/DL (ref 8.3–10.1)
CHLORIDE SERPL-SCNC: 104 MMOL/L (ref 96–108)
CHOLEST SERPL-MCNC: 200 MG/DL
CO2 SERPL-SCNC: 30 MMOL/L (ref 21–32)
CREAT SERPL-MCNC: 0.56 MG/DL (ref 0.6–1.3)
CREAT UR-MCNC: 157 MG/DL
EOSINOPHIL # BLD AUTO: 0.11 THOUSAND/ÂΜL (ref 0–0.61)
EOSINOPHIL NFR BLD AUTO: 1 % (ref 0–6)
ERYTHROCYTE [DISTWIDTH] IN BLOOD BY AUTOMATED COUNT: 13.9 % (ref 11.6–15.1)
GFR SERPL CREATININE-BSD FRML MDRD: 112 ML/MIN/1.73SQ M
GLUCOSE P FAST SERPL-MCNC: 82 MG/DL (ref 65–99)
HCT VFR BLD AUTO: 38.5 % (ref 34.8–46.1)
HDLC SERPL-MCNC: 55 MG/DL
HGB BLD-MCNC: 12.8 G/DL (ref 11.5–15.4)
IMM GRANULOCYTES # BLD AUTO: 0.04 THOUSAND/UL (ref 0–0.2)
IMM GRANULOCYTES NFR BLD AUTO: 1 % (ref 0–2)
LDLC SERPL CALC-MCNC: 97 MG/DL (ref 0–100)
LYMPHOCYTES # BLD AUTO: 2.35 THOUSANDS/ÂΜL (ref 0.6–4.47)
LYMPHOCYTES NFR BLD AUTO: 28 % (ref 14–44)
MCH RBC QN AUTO: 30.5 PG (ref 26.8–34.3)
MCHC RBC AUTO-ENTMCNC: 33.2 G/DL (ref 31.4–37.4)
MCV RBC AUTO: 92 FL (ref 82–98)
MICROALBUMIN UR-MCNC: 21.1 MG/L (ref 0–20)
MICROALBUMIN/CREAT 24H UR: 13 MG/G CREATININE (ref 0–30)
MONOCYTES # BLD AUTO: 0.58 THOUSAND/ÂΜL (ref 0.17–1.22)
MONOCYTES NFR BLD AUTO: 7 % (ref 4–12)
NEUTROPHILS # BLD AUTO: 5.22 THOUSANDS/ÂΜL (ref 1.85–7.62)
NEUTS SEG NFR BLD AUTO: 63 % (ref 43–75)
NONHDLC SERPL-MCNC: 145 MG/DL
NRBC BLD AUTO-RTO: 0 /100 WBCS
PLATELET # BLD AUTO: 297 THOUSANDS/UL (ref 149–390)
PMV BLD AUTO: 11.2 FL (ref 8.9–12.7)
POTASSIUM SERPL-SCNC: 3.9 MMOL/L (ref 3.5–5.3)
PROT SERPL-MCNC: 8.1 G/DL (ref 6.4–8.4)
RBC # BLD AUTO: 4.19 MILLION/UL (ref 3.81–5.12)
SODIUM SERPL-SCNC: 141 MMOL/L (ref 135–147)
TRIGL SERPL-MCNC: 240 MG/DL
TSH SERPL DL<=0.05 MIU/L-ACNC: 4.39 UIU/ML (ref 0.45–4.5)
WBC # BLD AUTO: 8.33 THOUSAND/UL (ref 4.31–10.16)

## 2022-09-28 PROCEDURE — 3061F NEG MICROALBUMINURIA REV: CPT | Performed by: NURSE PRACTITIONER

## 2022-09-28 PROCEDURE — 84443 ASSAY THYROID STIM HORMONE: CPT

## 2022-09-28 PROCEDURE — 82570 ASSAY OF URINE CREATININE: CPT | Performed by: NURSE PRACTITIONER

## 2022-09-28 PROCEDURE — 80061 LIPID PANEL: CPT

## 2022-09-28 PROCEDURE — 80053 COMPREHEN METABOLIC PANEL: CPT

## 2022-09-28 PROCEDURE — 36415 COLL VENOUS BLD VENIPUNCTURE: CPT

## 2022-09-28 PROCEDURE — 82043 UR ALBUMIN QUANTITATIVE: CPT | Performed by: NURSE PRACTITIONER

## 2022-09-28 PROCEDURE — 82306 VITAMIN D 25 HYDROXY: CPT

## 2022-09-28 PROCEDURE — 85025 COMPLETE CBC W/AUTO DIFF WBC: CPT

## 2022-09-28 NOTE — ASSESSMENT & PLAN NOTE
Chronic, suspect neck and shoulder tension contributing to sx   Will continue with Topamax 50 mg bid and trial muscle relaxer PRN   Would recommend also having eyes checked and labs done

## 2022-09-28 NOTE — ASSESSMENT & PLAN NOTE
Lab Results   Component Value Date    HGBA1C 5 6 09/20/2022     Continue with Trulicity 5 22 mg weekly   Referral for eye exam

## 2022-10-02 DIAGNOSIS — E55.9 VITAMIN D DEFICIENCY: Primary | ICD-10-CM

## 2022-10-02 RX ORDER — ERGOCALCIFEROL 1.25 MG/1
50000 CAPSULE ORAL WEEKLY
Qty: 16 CAPSULE | Refills: 0 | Status: SHIPPED | OUTPATIENT
Start: 2022-10-02 | End: 2023-08-03

## 2022-11-16 DIAGNOSIS — E11.69 TYPE 2 DIABETES MELLITUS WITH OTHER SPECIFIED COMPLICATION, WITHOUT LONG-TERM CURRENT USE OF INSULIN (HCC): ICD-10-CM

## 2022-11-16 RX ORDER — DULAGLUTIDE 0.75 MG/.5ML
INJECTION, SOLUTION SUBCUTANEOUS
Qty: 2 ML | Refills: 0 | Status: SHIPPED | OUTPATIENT
Start: 2022-11-16

## 2022-12-06 NOTE — PROGRESS NOTES
Name: Naveen Smith      : 1975      MRN: [id number]  Encounter Provider: LUZ Rutledge  Encounter Date: 2022   Encounter department: [address] [place] [address]     1  Leg edema  Assessment & Plan:  -Recommend supportive management such as decreasing salt intake, leg elevation, compression stocking, exercise  2  Type 2 diabetes mellitus with other specified complication, without long-term current use of insulin (HCC)  -     Dulaglutide (Trulicity) 1 38 DV/6 2QH SOPN; Inject 0 5 mL (0 75 mg total) under the skin once a week    3  Encounter for immunization  -     influenza vaccine, quadrivalent, recombinant, PF, 0 5 mL, for patients 18 yr+ (FLUBLOK)  -     Age 15 y+, BOOSTER (BIVALENT): [place] 78 vac bivalent kylee-sucr  -     Pneumococcal Conjugate Vaccine 20-valent (Pcv20)    4  Encounter for screening mammogram for malignant neoplasm of breast  -     Mammo screening bilateral w 3d & cad; Future; Expected date: 2022    5  BMI 36 0-36 9,adult  -     Ambulatory Referral to Weight Management; Future    6  Hypothyroidism, unspecified type  -     TSH, 3rd generation with Free T4 reflex; Future         Subjective      Naveen Smith is a 56 y o  female  has a past medical history of Ankle swelling, Calf pain, Constipation, Diabetes mellitus ([address] ), Disease of thyroid gland, Double vision, GERD (gastroesophageal reflux disease), H/O: hysterectomy, Headache, History of hysterectomy, Hypertension, Joint pain, Numbness, Palpitations, Polycystic ovary syndrome, and SOB (shortness of breath)  has a past surgical history that includes Hysterectomy  She presents today endorsing bilateral lower leg edema  This has been an ongoing problem  She uses lasix prn for this which sometimes helps  She is on her feet for many hours at time at work which worsens the edema   She does elevated her legs above heart level after work which also helps  She has not tried compression stockings  She denies exercise and following a healthy low-salt diet as this is difficult for her due to her many responsibilities  She was being evaluated for bariatric surgery but her insurance did not cover  She is interested in going the non-surgical route  She is also requesting a TSH level check due to fatigue and hair loss  Review of Systems   Constitutional: Positive for fatigue  Negative for chills and fever  HENT: Negative for ear pain and sore throat  Eyes: Negative for pain and visual disturbance  Respiratory: Negative for cough and shortness of breath  Cardiovascular: Positive for leg swelling  Negative for chest pain and palpitations  Gastrointestinal: Negative for abdominal pain and vomiting  Genitourinary: Negative for dysuria and hematuria  Musculoskeletal: Negative for arthralgias and back pain  Skin: Negative for color change and rash  (+) hair loss   Neurological: Negative for seizures and syncope  All other systems reviewed and are negative        Current Outpatient Medications on File Prior to Visit   Medication Sig   • atorvastatin (LIPITOR) 40 mg tablet Take 1 tablet (40 mg total) by mouth daily   • ergocalciferol (VITAMIN D2) 50,000 units Take 1 capsule (50,000 Units total) by mouth once a week   • famotidine (PEPCID) 20 mg tablet Take 1 tablet (20 mg total) by mouth 2 (two) times a day   • fenofibrate (TRICOR) 48 mg tablet Take 1 tablet (48 mg total) by mouth daily   • furosemide (LASIX) 20 mg tablet Take 1 tablet (20 mg total) by mouth daily as needed (hand swelling)   • levothyroxine 125 mcg tablet Take 1 tablet (125 mcg total) by mouth daily   • loratadine (CLARITIN) 10 mg tablet Take 1 tablet (10 mg total) by mouth daily   • melatonin 3 mg Take 1 tablet (3 mg total) by mouth daily at bedtime   • methocarbamol (ROBAXIN) 500 mg tablet Take 1 tablet (500 mg total) by mouth 2 (two) times a day as needed for muscle spasms   • omeprazole (PriLOSEC) 20 mg delayed release capsule Take 1 capsule (20 mg total) by mouth 2 (two) times a day   • polyethylene glycol (GLYCOLAX) 17 GM/SCOOP powder Take 17 g by mouth 2 (two) times a day   • topiramate (Topamax) 50 MG tablet Take 1 tablet (50 mg total) by mouth 2 (two) times a day   • [DISCONTINUED] Trulicity 3 80 JY/5 4EM SOPN INJECT 0 5 ML UNDER THE SKIN ONCE WEEKLY       Objective     /80 (BP Location: Left arm, Patient Position: Sitting, Cuff Size: Large)   Pulse 93   Temp 98 2 °F (36 8 °C) (Temporal)   Resp 18   Ht 5' 3" (1 6 m)   Wt 94 3 kg (208 lb)   SpO2 99%   BMI 36 85 kg/m²     Physical Exam  Vitals and nursing note reviewed  Constitutional:       Appearance: She is obese  HENT:      Head: Normocephalic and atraumatic  Right Ear: External ear normal       Left Ear: External ear normal       Nose: Nose normal       Mouth/Throat:      Mouth: Mucous membranes are moist       Pharynx: Oropharynx is clear  Eyes:      Extraocular Movements: Extraocular movements intact  Conjunctiva/sclera: Conjunctivae normal       Pupils: Pupils are equal, round, and reactive to light  Cardiovascular:      Rate and Rhythm: Normal rate and regular rhythm  Pulses: Normal pulses  Heart sounds: Normal heart sounds  Pulmonary:      Effort: Pulmonary effort is normal       Breath sounds: Normal breath sounds  Abdominal:      General: Abdomen is flat  Bowel sounds are normal       Palpations: Abdomen is soft  Tenderness: There is no abdominal tenderness  Musculoskeletal:         General: Normal range of motion  Cervical back: Normal range of motion and neck supple  Right lower leg: Normal  No edema  Left lower leg: Normal  No edema  Right ankle: Normal       Left ankle: Normal    Skin:     General: Skin is warm and dry  Neurological:      General: No focal deficit present        Mental Status: She is alert and oriented to person, place, and time  Mental status is at baseline  Psychiatric:         Mood and Affect: Mood normal          Behavior: Behavior normal          Thought Content:  Thought content normal          Judgment: Judgment normal        Damaris Craft

## 2022-12-07 ENCOUNTER — OFFICE VISIT (OUTPATIENT)
Dept: FAMILY MEDICINE CLINIC | Facility: CLINIC | Age: 47
End: 2022-12-07

## 2022-12-07 VITALS
WEIGHT: 208 LBS | HEART RATE: 93 BPM | RESPIRATION RATE: 18 BRPM | HEIGHT: 63 IN | DIASTOLIC BLOOD PRESSURE: 80 MMHG | TEMPERATURE: 98.2 F | SYSTOLIC BLOOD PRESSURE: 110 MMHG | OXYGEN SATURATION: 99 % | BODY MASS INDEX: 36.86 KG/M2

## 2022-12-07 DIAGNOSIS — E03.9 HYPOTHYROIDISM, UNSPECIFIED TYPE: ICD-10-CM

## 2022-12-07 DIAGNOSIS — Z12.31 ENCOUNTER FOR SCREENING MAMMOGRAM FOR MALIGNANT NEOPLASM OF BREAST: ICD-10-CM

## 2022-12-07 DIAGNOSIS — E11.69 TYPE 2 DIABETES MELLITUS WITH OTHER SPECIFIED COMPLICATION, WITHOUT LONG-TERM CURRENT USE OF INSULIN (HCC): ICD-10-CM

## 2022-12-07 DIAGNOSIS — Z23 ENCOUNTER FOR IMMUNIZATION: ICD-10-CM

## 2022-12-07 DIAGNOSIS — R60.0 LEG EDEMA: Primary | ICD-10-CM

## 2022-12-07 PROBLEM — Z90.710 HISTORY OF HYSTERECTOMY: Status: RESOLVED | Noted: 2021-09-09 | Resolved: 2022-12-07

## 2022-12-07 RX ORDER — DULAGLUTIDE 0.75 MG/.5ML
0.75 INJECTION, SOLUTION SUBCUTANEOUS WEEKLY
Qty: 4 ML | Refills: 0 | Status: SHIPPED | OUTPATIENT
Start: 2022-12-07

## 2022-12-07 NOTE — ASSESSMENT & PLAN NOTE
-Recommend supportive management such as decreasing salt intake, leg elevation, compression stocking, exercise

## 2022-12-16 ENCOUNTER — HOSPITAL ENCOUNTER (OUTPATIENT)
Dept: ULTRASOUND IMAGING | Facility: HOSPITAL | Age: 47
Discharge: HOME | End: 2022-12-16

## 2022-12-16 DIAGNOSIS — R10.13 EPIGASTRIC PAIN: ICD-10-CM

## 2023-02-17 DIAGNOSIS — E11.69 TYPE 2 DIABETES MELLITUS WITH OTHER SPECIFIED COMPLICATION, WITHOUT LONG-TERM CURRENT USE OF INSULIN (HCC): ICD-10-CM

## 2023-02-18 RX ORDER — DULAGLUTIDE 0.75 MG/.5ML
0.75 INJECTION, SOLUTION SUBCUTANEOUS WEEKLY
Qty: 4 ML | Refills: 0 | Status: SHIPPED | OUTPATIENT
Start: 2023-02-18

## 2023-03-24 ENCOUNTER — APPOINTMENT (OUTPATIENT)
Dept: LAB | Facility: HOSPITAL | Age: 48
End: 2023-03-24

## 2023-03-24 DIAGNOSIS — E03.9 HYPOTHYROIDISM, UNSPECIFIED TYPE: ICD-10-CM

## 2023-03-24 LAB — TSH SERPL DL<=0.05 MIU/L-ACNC: 2.22 UIU/ML (ref 0.45–4.5)

## 2023-04-10 PROBLEM — B02.9 HERPES ZOSTER WITHOUT COMPLICATION: Status: RESOLVED | Noted: 2021-02-08 | Resolved: 2023-04-10

## 2023-04-10 PROBLEM — E66.9 OBESITY (BMI 30-39.9): Status: ACTIVE | Noted: 2020-12-11

## 2023-04-10 PROBLEM — L29.9 ITCH OF SKIN: Status: RESOLVED | Noted: 2022-01-24 | Resolved: 2023-04-10

## 2023-04-10 PROBLEM — M25.552 LEFT HIP PAIN: Status: RESOLVED | Noted: 2021-09-09 | Resolved: 2023-04-10

## 2023-04-10 PROBLEM — M79.671 RIGHT FOOT PAIN: Status: ACTIVE | Noted: 2023-04-10

## 2023-04-10 PROBLEM — M62.838 MUSCLE SPASM: Status: RESOLVED | Noted: 2020-12-11 | Resolved: 2023-04-10

## 2023-06-05 DIAGNOSIS — E03.9 HYPOTHYROIDISM, UNSPECIFIED TYPE: ICD-10-CM

## 2023-06-05 RX ORDER — LEVOTHYROXINE SODIUM 0.12 MG/1
125 TABLET ORAL DAILY
Qty: 90 TABLET | Refills: 0 | Status: SHIPPED | OUTPATIENT
Start: 2023-06-05

## 2023-06-28 DIAGNOSIS — E11.69 TYPE 2 DIABETES MELLITUS WITH OTHER SPECIFIED COMPLICATION, WITHOUT LONG-TERM CURRENT USE OF INSULIN (HCC): ICD-10-CM

## 2023-06-28 RX ORDER — DULAGLUTIDE 0.75 MG/.5ML
0.75 INJECTION, SOLUTION SUBCUTANEOUS WEEKLY
Qty: 16 ML | Refills: 0 | Status: SHIPPED | OUTPATIENT
Start: 2023-06-28 | End: 2023-07-26 | Stop reason: ALTCHOICE

## 2023-06-29 ENCOUNTER — TELEPHONE (OUTPATIENT)
Dept: FAMILY MEDICINE CLINIC | Facility: CLINIC | Age: 48
End: 2023-06-29

## 2023-06-29 NOTE — TELEPHONE ENCOUNTER
06/29/23    Pt has been informed about med being sent to Pharmacy  Pt stated that she was aware due to Pharmacy letting her know  Pt October appt has been reschedule 07/24/23 Per her Request / needs DM f/u

## 2023-07-18 ENCOUNTER — OFFICE VISIT (OUTPATIENT)
Dept: BARIATRICS | Facility: CLINIC | Age: 48
End: 2023-07-18
Payer: COMMERCIAL

## 2023-07-18 VITALS
HEART RATE: 94 BPM | RESPIRATION RATE: 16 BRPM | WEIGHT: 217.6 LBS | BODY MASS INDEX: 38.55 KG/M2 | HEIGHT: 63 IN | DIASTOLIC BLOOD PRESSURE: 71 MMHG | SYSTOLIC BLOOD PRESSURE: 111 MMHG

## 2023-07-18 DIAGNOSIS — K21.9 GASTROESOPHAGEAL REFLUX DISEASE WITHOUT ESOPHAGITIS: ICD-10-CM

## 2023-07-18 DIAGNOSIS — E03.9 HYPOTHYROIDISM, UNSPECIFIED TYPE: ICD-10-CM

## 2023-07-18 DIAGNOSIS — G43.919 INTRACTABLE MIGRAINE WITHOUT STATUS MIGRAINOSUS, UNSPECIFIED MIGRAINE TYPE: ICD-10-CM

## 2023-07-18 DIAGNOSIS — E11.69 TYPE 2 DIABETES MELLITUS WITH OTHER SPECIFIED COMPLICATION, WITHOUT LONG-TERM CURRENT USE OF INSULIN (HCC): ICD-10-CM

## 2023-07-18 DIAGNOSIS — E66.9 OBESITY (BMI 30-39.9): Primary | ICD-10-CM

## 2023-07-18 DIAGNOSIS — E78.5 HYPERLIPIDEMIA, UNSPECIFIED HYPERLIPIDEMIA TYPE: ICD-10-CM

## 2023-07-18 PROCEDURE — 99244 OFF/OP CNSLTJ NEW/EST MOD 40: CPT | Performed by: NURSE PRACTITIONER

## 2023-07-18 NOTE — ASSESSMENT & PLAN NOTE
- Taking Trulicity. Will check with primary about possibly changing to Ozempic. May improve with weight loss and lifestyle modification.        Lab Results   Component Value Date    HGBA1C 5.6 03/24/2023

## 2023-07-18 NOTE — ASSESSMENT & PLAN NOTE
- Discussed options of [facility]-Intensive Lifestyle Intervention Program, Very Low Calorie Diet-VLCD, Conservative Program, Mario-En-Y Gastric Bypass and Vertical Sleeve Gastrectomy and the role of weight loss medications. - Insurance does not cover weight loss surgery. - Patient is interested in pursuing Conservative Program with menu planning with dietician. She is interested in partial meal replacement. Samples of [employer] products given. - Initial weight loss goal of 5-10% weight loss for improved health  - Weight loss can improve patient's co-morbid conditions and/or prevent weight-related complications. - S/P hysterectomy  - On phentermine in the past and did well. - Already on Topamax for migraine prevention, but no difference with appetite. - Taking Trulicity through primary care. No difference with appetite. Will review with primary care to see if changing to Ozempic possible, to help with weight loss. - Patient denies personal history of pancreatitis. Patient also denies personal and family history of thyroid cancer and multiple endocrine neoplasia type 2 (MEN 2 tumor). - Stop [place] 2/8  - Labs reviewed: TSH and A1C 3/24/2023 were both within acceptable range. Lipid panel, CMP, and CBC 9/28/2022. Trig elevated and will likely improve with weight loss. Remainder of the blood work was within acceptable range. Goals:  Do not skip meals. Food log (ie.) www.[employer].com,MyRegistry.com,"[employer]oDesk. com,etc. baritastic (use [employer], dietdoctor. com or smartphone manpreet [employer] for recipes)  No sugary beverages. At least 64oz of water daily. Increase physical activity by 10 minutes daily. Gradually increase physical activity to a goal of 5 days per week for 30 minutes of MODERATE intensity PLUS 2 days per week of FULL BODY resistance training (use smartphone apps [employer], Home Workout, etc.)  Start food logging, weighing and measuring food. 1300 calories per day. Sample menu given. Further nutrition recommendations per the dietician. Keep up the great work with water intake, at least 64 oz daily. Start exercise, such as walking 2 days per week for 15 minutes and gradually increase to goal of 5 days per week for 30 minutes and 2 days resistance training. Increase sleep time closer to 7-8 hours. Get 3 meals per day with healthy snacks in between to avoid getting excessively hungry.

## 2023-07-18 NOTE — ASSESSMENT & PLAN NOTE
- Taking atorvastatin. May improve with weight loss and lifestyle modification. Continue management with prescribing provider.

## 2023-07-18 NOTE — ASSESSMENT & PLAN NOTE
- Taking pepcid and protonix. May improve with weight loss and lifestyle modification. Continue management with prescribing provider.

## 2023-07-25 ENCOUNTER — APPOINTMENT (OUTPATIENT)
Dept: LAB | Facility: HOSPITAL | Age: 48
End: 2023-07-25
Payer: COMMERCIAL

## 2023-07-25 DIAGNOSIS — E11.69 TYPE 2 DIABETES MELLITUS WITH OTHER SPECIFIED COMPLICATION, WITHOUT LONG-TERM CURRENT USE OF INSULIN (HCC): ICD-10-CM

## 2023-07-25 LAB
ANION GAP SERPL CALCULATED.3IONS-SCNC: 7 MMOL/L
BUN SERPL-MCNC: 20 MG/DL (ref 5–25)
CALCIUM SERPL-MCNC: 9.8 MG/DL (ref 8.4–10.2)
CHLORIDE SERPL-SCNC: 104 MMOL/L (ref 96–108)
CHOLEST SERPL-MCNC: 252 MG/DL
CO2 SERPL-SCNC: 27 MMOL/L (ref 21–32)
CREAT SERPL-MCNC: 0.72 MG/DL (ref 0.6–1.3)
GFR SERPL CREATININE-BSD FRML MDRD: 100 ML/MIN/1.73SQ M
GLUCOSE P FAST SERPL-MCNC: 125 MG/DL (ref 65–99)
HDLC SERPL-MCNC: 47 MG/DL
LDLC SERPL CALC-MCNC: 125 MG/DL (ref 0–100)
POTASSIUM SERPL-SCNC: 3.8 MMOL/L (ref 3.5–5.3)
SODIUM SERPL-SCNC: 138 MMOL/L (ref 135–147)
TRIGL SERPL-MCNC: 398 MG/DL

## 2023-07-25 PROCEDURE — 80061 LIPID PANEL: CPT

## 2023-07-25 PROCEDURE — 80048 BASIC METABOLIC PNL TOTAL CA: CPT

## 2023-07-26 ENCOUNTER — TELEPHONE (OUTPATIENT)
Dept: BARIATRICS | Facility: CLINIC | Age: 48
End: 2023-07-26

## 2023-07-26 DIAGNOSIS — E66.9 DIABETES MELLITUS TYPE 2 IN OBESE (HCC): Primary | ICD-10-CM

## 2023-07-26 DIAGNOSIS — E11.69 DIABETES MELLITUS TYPE 2 IN OBESE (HCC): Primary | ICD-10-CM

## 2023-07-26 NOTE — TELEPHONE ENCOUNTER
----- Message from Trice Bal, [address] [place] [address] sent at 7/18/2023  5:43 PM EDT -----  Regarding: RE: Valente Reyes, yes that works, thank you  ----- Message -----  From: LUZ Clayton  Sent: 7/18/2023   5:06 PM EDT  To: LZU Jesus  Subject: Efrain Wells,     I saw our mutual patient today in medical weight management consultation. I was thinking of trying to change her from Trulicity to Ozempic to try to help with weight loss, but wanted to reach out to you first. It is okay with you if I try to get Ozempic approved for her?      Thanks,  Mayela

## 2023-07-26 NOTE — TELEPHONE ENCOUNTER
Message sent via [address] [place] [address] regarding [address] [place] [address]. Per patient and her  (as discussed during the office visit), okay to message via [address] [place] [address].

## 2023-08-02 NOTE — PROGRESS NOTES
Name: Kayode Vidales      : 1975      MRN: [id number]  Encounter Provider: LUZ Fontenot  Encounter Date: 8/3/2023   Encounter department: [facility]     1. Type 2 diabetes mellitus with other specified complication, without long-term current use of insulin [facility])  Assessment & Plan:    Lab Results   Component Value Date    HGBA1C 6.2 (H) 2023   - was controlled with trulicity. Weight management switched to ozempic to help with weight loss. She is awaiting prior auth approval.    Orders:  -     HEMOGLOBIN A1C W/ EAG ESTIMATION; Standing  -     HEMOGLOBIN A1C W/ EAG ESTIMATION  -     atorvastatin (LIPITOR) 80 mg tablet; Take 1 tablet (80 mg total) by mouth daily    2. Encounter for screening for HIV  -     : HIV 1/2 AB/AG w Reflex [facility] for 2 yr old and above; Future    3. Hypothyroidism, unspecified type  Assessment & Plan:  Lab Results   Component Value Date    [id number] 6.842 (H) 2023     Normal T4. Given symptoms, will increase synthroid from 125 mcg daily to synthroid 150 mcg daily. Check TSH in 6 weeks    Orders:  -     levothyroxine (Synthroid) 150 mcg tablet; Take 1 tablet (150 mcg total) by mouth daily    4. Hyperlipidemia, unspecified hyperlipidemia type  Assessment & Plan:  Lab Results   Component Value Date    CHOLESTEROL 252 (H) 2023    CHOLESTEROL 200 2022    CHOLESTEROL 244 (H) 2022     Lab Results   Component Value Date    HDL 47 (L) 2023    HDL 55 2022    HDL 47 (L) 2022     Lab Results   Component Value Date    TRIG 398 (H) 2023    TRIG 240 (H) 2022    TRIG 249 (H) 2022     Lab Results   Component Value Date    NONHDLC 145 2022    [address] 197 2022    [address] 191 2022     Lab Results   Component Value Date    LDLCALC 125 (H) 2023     - reports compliance with lipitor 40 mg daily. Increase to 80 mg daily.  Dietary counseling provided. Orders:  -     atorvastatin (LIPITOR) 80 mg tablet; Take 1 tablet (80 mg total) by mouth daily    5. Gastroesophageal reflux disease without esophagitis  Assessment & Plan:  - Continue Pepcid 20 mg BID, protonix 20 mg BID. Nonpharmacologic treatments include: eating smaller meals, elevation of the head of bed at night, avoidance of caffeine, chocolate, nicotine and peppermint, and avoiding tight fitting clothing.    - F/u GI    Orders:  -     famotidine (PEPCID) 20 mg tablet; Take 1 tablet (20 mg total) by mouth 2 (two) times a day  -     pantoprazole (PROTONIX) 20 mg tablet; Take 1 tablet (20 mg total) by mouth 2 (two) times a day    6. Intractable migraine without status migrainosus, unspecified migraine type  Assessment & Plan:  - Continue topamax. Orders:  -     topiramate (Topamax) 50 MG tablet; Take 1 tablet (50 mg total) by mouth 2 (two) times a day    7. Difficulty sleeping  Assessment & Plan:  - Continue melatonin      8. Obesity (BMI 30-39. 9)  Assessment & Plan:  BMI Counseling: Body mass index is 38.55 kg/m². The BMI is above normal. Nutrition recommendations include reducing portion sizes, decreasing overall calorie intake, 3-5 servings of fruits/vegetables daily, reducing fast food intake, consuming healthier snacks, decreasing soda and/or juice intake, moderation in carbohydrate intake, increasing intake of lean protein, reducing intake of saturated fat and trans fat and reducing intake of cholesterol. Exercise recommendations include moderate aerobic physical activity for 150 minutes/week.  Continue weight management follow-up             Fifi Canela is a 56 y.o. female  has a past medical history of Ankle swelling, Calf pain, Constipation, Diabetes mellitus ([address]), Disease of thyroid gland, Double vision, GERD (gastroesophageal reflux disease), H/O: hysterectomy, Headache, History of hysterectomy, History of hysterectomy, Hypertension, Joint pain, Numbness, Palpitations, Polycystic ovary syndrome, and SOB (shortness of breath). has a past surgical history that includes Hysterectomy. Patient presents today for follow-up of chronic conditions. Review of Systems   Constitutional: Positive for fatigue and unexpected weight change. Negative for chills and fever. HENT: Negative for ear pain and sore throat. Eyes: Negative for pain and visual disturbance. Respiratory: Negative for cough and shortness of breath. Cardiovascular: Positive for palpitations. Negative for chest pain. Gastrointestinal: Negative for abdominal pain and vomiting. Genitourinary: Negative for dysuria and hematuria. Musculoskeletal: Negative for arthralgias and back pain. Skin: Negative for color change and rash. (+) hair loss     Neurological: Negative for seizures and syncope. All other systems reviewed and are negative. Current Outpatient Medications on File Prior to Visit   Medication Sig   • melatonin 3 mg Take 1 tablet (3 mg total) by mouth daily at bedtime   • methocarbamol (ROBAXIN) 500 mg tablet Take 1 tablet (500 mg total) by mouth 2 (two) times a day as needed for muscle spasms   • polyethylene glycol (GLYCOLAX) 17 GM/SCOOP powder Take 17 g by mouth 2 (two) times a day   • semaglutide, 0.25 or 0.5 mg/dose, (Ozempic, 0.25 or 0.5 MG/DOSE,) 2 mg/3 mL injection pen Take 0.25 mg subcutaneously once a week for 4 weeks then increase to 0.5 mg subcutaneously once a week.    • [DISCONTINUED] atorvastatin (LIPITOR) 40 mg tablet Take 1 tablet (40 mg total) by mouth daily   • [DISCONTINUED] ergocalciferol (VITAMIN D2) 50,000 units Take 1 capsule (50,000 Units total) by mouth once a week   • [DISCONTINUED] famotidine (PEPCID) 20 mg tablet Take 1 tablet (20 mg total) by mouth 2 (two) times a day   • [DISCONTINUED] levothyroxine 125 mcg tablet Take 1 tablet (125 mcg total) by mouth daily   • [DISCONTINUED] pantoprazole (PROTONIX) 20 mg tablet Take 1 tablet (20 mg total) by mouth 2 (two) times a day   • [DISCONTINUED] topiramate (Topamax) 50 MG tablet Take 1 tablet (50 mg total) by mouth 2 (two) times a day       Objective     /82 (BP Location: Left arm, Patient Position: Sitting, Cuff Size: Standard)   Pulse 93   Temp 98.3 °F (36.8 °C) (Temporal)   Resp 18   Ht 5' 3" (1.6 m)   Wt 98.7 kg (217 lb 9.6 oz)   SpO2 98%   Breastfeeding No   BMI 38.55 kg/m²     Physical Exam  Vitals and nursing note reviewed. Constitutional:       Appearance: She is obese. HENT:      Head: Normocephalic and atraumatic. Right Ear: External ear normal.      Left Ear: External ear normal.      Nose: Nose normal.   Eyes:      Extraocular Movements: Extraocular movements intact. Conjunctiva/sclera: Conjunctivae normal.      Pupils: Pupils are equal, round, and reactive to light. Cardiovascular:      Rate and Rhythm: Normal rate and regular rhythm. Pulses: Normal pulses. Heart sounds: Normal heart sounds. Pulmonary:      Effort: Pulmonary effort is normal.      Breath sounds: Normal breath sounds. Abdominal:      General: Bowel sounds are normal.      Palpations: Abdomen is soft. Tenderness: There is no abdominal tenderness. Musculoskeletal:         General: Normal range of motion. Cervical back: Normal range of motion. Skin:     General: Skin is warm and dry. Capillary Refill: Capillary refill takes less than 2 seconds. Neurological:      General: No focal deficit present. Mental Status: She is alert and oriented to person, place, and time. Mental status is at baseline. Psychiatric:         Mood and Affect: Mood normal.         Behavior: Behavior normal.         Thought Content:  Thought content normal.         Judgment: Judgment normal.       Nicolle Soni

## 2023-08-03 ENCOUNTER — OFFICE VISIT (OUTPATIENT)
Dept: FAMILY MEDICINE CLINIC | Facility: CLINIC | Age: 48
End: 2023-08-03

## 2023-08-03 VITALS
OXYGEN SATURATION: 98 % | HEIGHT: 63 IN | WEIGHT: 217.6 LBS | DIASTOLIC BLOOD PRESSURE: 82 MMHG | BODY MASS INDEX: 38.55 KG/M2 | TEMPERATURE: 98.3 F | SYSTOLIC BLOOD PRESSURE: 104 MMHG | RESPIRATION RATE: 18 BRPM | HEART RATE: 93 BPM

## 2023-08-03 DIAGNOSIS — E78.5 HYPERLIPIDEMIA, UNSPECIFIED HYPERLIPIDEMIA TYPE: ICD-10-CM

## 2023-08-03 DIAGNOSIS — E66.9 OBESITY (BMI 30-39.9): ICD-10-CM

## 2023-08-03 DIAGNOSIS — G47.9 DIFFICULTY SLEEPING: ICD-10-CM

## 2023-08-03 DIAGNOSIS — G43.919 INTRACTABLE MIGRAINE WITHOUT STATUS MIGRAINOSUS, UNSPECIFIED MIGRAINE TYPE: ICD-10-CM

## 2023-08-03 DIAGNOSIS — Z11.4 ENCOUNTER FOR SCREENING FOR HIV: ICD-10-CM

## 2023-08-03 DIAGNOSIS — K21.9 GASTROESOPHAGEAL REFLUX DISEASE WITHOUT ESOPHAGITIS: ICD-10-CM

## 2023-08-03 DIAGNOSIS — E11.69 TYPE 2 DIABETES MELLITUS WITH OTHER SPECIFIED COMPLICATION, WITHOUT LONG-TERM CURRENT USE OF INSULIN (HCC): Primary | ICD-10-CM

## 2023-08-03 DIAGNOSIS — E03.9 HYPOTHYROIDISM, UNSPECIFIED TYPE: ICD-10-CM

## 2023-08-03 PROCEDURE — 99214 OFFICE O/P EST MOD 30 MIN: CPT

## 2023-08-03 RX ORDER — PANTOPRAZOLE SODIUM 20 MG/1
20 TABLET, DELAYED RELEASE ORAL 2 TIMES DAILY
Qty: 180 TABLET | Refills: 0 | Status: SHIPPED | OUTPATIENT
Start: 2023-08-03 | End: 2024-01-30

## 2023-08-03 RX ORDER — ATORVASTATIN CALCIUM 80 MG/1
80 TABLET, FILM COATED ORAL DAILY
Qty: 90 TABLET | Refills: 3 | Status: SHIPPED | OUTPATIENT
Start: 2023-08-03

## 2023-08-03 RX ORDER — TOPIRAMATE 50 MG/1
50 TABLET, FILM COATED ORAL 2 TIMES DAILY
Qty: 180 TABLET | Refills: 1 | Status: SHIPPED | OUTPATIENT
Start: 2023-08-03

## 2023-08-03 RX ORDER — FAMOTIDINE 20 MG/1
20 TABLET, FILM COATED ORAL 2 TIMES DAILY
Qty: 180 TABLET | Refills: 1 | Status: SHIPPED | OUTPATIENT
Start: 2023-08-03

## 2023-08-03 RX ORDER — LEVOTHYROXINE SODIUM 0.15 MG/1
150 TABLET ORAL DAILY
Qty: 90 TABLET | Refills: 0 | Status: SHIPPED | OUTPATIENT
Start: 2023-08-03 | End: 2023-08-03 | Stop reason: SDUPTHER

## 2023-08-03 RX ORDER — LEVOTHYROXINE SODIUM 0.15 MG/1
150 TABLET ORAL DAILY
Qty: 90 TABLET | Refills: 0 | Status: SHIPPED | OUTPATIENT
Start: 2023-08-03

## 2023-08-03 RX ORDER — ATORVASTATIN CALCIUM 80 MG/1
80 TABLET, FILM COATED ORAL DAILY
Qty: 90 TABLET | Refills: 3 | Status: SHIPPED | OUTPATIENT
Start: 2023-08-03 | End: 2023-08-03 | Stop reason: SDUPTHER

## 2023-08-03 NOTE — PROGRESS NOTES
Weight Management Medical Nutrition Assessment  Miriam Reasons presented for 1/6 bundle.  interpreted for today's visit. Today's weight is 217.3#. Patient has a history of type 2 diabetes. Reports challenging eating schedule due to working 3rd shift. Per dietary recall patient consumes excess calories from portion sizes and skips meals when working. Encouraged patient to add in snacks or additional meal as carbohydrates help to stabilize blood sugars. Reviewed carbohydrates. Handout provided. Patient interested in meal replacement for breakfast. Product ordered. Discussed snack ideas when working. Provided sample menu. Hydration adequate. Patient agreeable to reduce sugar in coffee. We developed and reviewed a low calorie meal plan.     Patient seen by Medical Provider in past 6 months:  yes  Requested to schedule appointment with Medical Provider: No      Anthropometric Measurements  Start Weight (#): 217.3#  Current Weight (#): 217.3#  TBW % Change from start weight: n/a  Ideal Body Weight (#): 115#  Goal Weight (#): lose 40#  Highest:  Lowest:    Weight Loss History  Previous weight loss attempts: Exercise  Self Created Diets (Portion Control, Healthy Food Choices, etc.)  Prescription Weight Loss Medications    Food and Nutrition Related History  Wake up: 4-5PM  Bed Time: sometime after 7AM  (works 3rd shift)    Food Recall  Breakfast: fried food OR bread and eggs    Snack: skip  Lunch: skip  Snack: skip  Dinner: rice, beans, chicken   Snack: coffee with 1 Tbsp sugar and milk    Beverages: 6-8 water bottles, 2 cups coffee with 1 Tbsp sugar  Alcohol: none  Volume of beverage intake: 52-80oz    Weekends: Same  Cravings: none reported  Trouble area of day: at work skips meal    Frequency of Eating out: occasionally  Food restrictions: none reported  Cooking: self or   Food Shopping: self or     Physical Activity Intake  Activity: none  Frequency: none   Physical limitations/barriers to exercise: none    Estimated Needs  Energy  B[employer] [place] Energy Needs: BMR : 2697   1-2# loss weekly sedentary:  912-1412             1-2# loss weekly lightly active: 9350-6205  Maintenance calories for sedentary activity level: 1912  Protein: 63-78g      (1.2-1.5g/kg IBW)  Fluid: 62oz    (35mL/kg IBW)    Nutrition Diagnosis  Yes; Overweight/obesity  related to Excess energy intake as evidenced by  BMI more than normative standard for age and sex (obesity-grade II 35-39. 9)       Nutrition Intervention    Nutrition Prescription  Calories: 1300 calories  Protein: 63-78g  Fluid: 62oz    Meal Plan (Osmel/Pro/Carb)  Breakfast: 300/25/30-45  Snack:  Lunch: 500/25/30-45  Snack: 100-150/5-10/15-20  Dinner: 400/15/30-45  Snack:    Nutrition Education:    Calorie controlled menu  Lean protein food choices  Healthy snack options  Food journaling tips  Carbohydrates      Nutrition Counseling:  Strategies: meal planning, portion sizes, healthy snack choices, hydration, fiber intake, protein intake, exercise, food journal      Monitoring and Evaluation:  Evaluation criteria:  Energy Intake  Meet protein needs  Maintain adequate hydration  Monitor weekly weight  Meal planning/preparation  Food journal   Decreased portions at mealtimes and snacks  Physical activity     Barriers to learning:language  Readiness to change: Preparation:  (Getting ready to change)   Comprehension: good  Expected Compliance: good

## 2023-08-03 NOTE — ASSESSMENT & PLAN NOTE
Lab Results   Component Value Date    CHOLESTEROL 252 (H) 07/25/2023    CHOLESTEROL 200 09/28/2022    CHOLESTEROL 244 (H) 06/27/2022     Lab Results   Component Value Date    HDL 47 (L) 07/25/2023    HDL 55 09/28/2022    HDL 47 (L) 06/27/2022     Lab Results   Component Value Date    TRIG 398 (H) 07/25/2023    TRIG 240 (H) 09/28/2022    TRIG 249 (H) 06/27/2022     Lab Results   Component Value Date    NONHDLC 145 09/28/2022    [address] 06/27/2022    [address] 03/23/2022     Lab Results   Component Value Date    LDLCALC 125 (H) 07/25/2023     - reports compliance with lipitor 40 mg daily. Increase to 80 mg daily. Dietary counseling provided.

## 2023-08-03 NOTE — ASSESSMENT & PLAN NOTE
Lab Results   Component Value Date    [id number] 6.842 (H) 07/25/2023     Normal T4. Given symptoms, will increase synthroid from 125 mcg daily to synthroid 150 mcg daily.  Check TSH in 6 weeks

## 2023-08-03 NOTE — ASSESSMENT & PLAN NOTE
BMI Counseling: Body mass index is 38.55 kg/m². The BMI is above normal. Nutrition recommendations include reducing portion sizes, decreasing overall calorie intake, 3-5 servings of fruits/vegetables daily, reducing fast food intake, consuming healthier snacks, decreasing soda and/or juice intake, moderation in carbohydrate intake, increasing intake of lean protein, reducing intake of saturated fat and trans fat and reducing intake of cholesterol. Exercise recommendations include moderate aerobic physical activity for 150 minutes/week.  Continue weight management follow-up

## 2023-08-03 NOTE — ASSESSMENT & PLAN NOTE
Lab Results   Component Value Date    HGBA1C 6.2 (H) 07/25/2023   - was controlled with trulicity. Weight management switched to ozempic to help with weight loss.  She is awaiting prior auth approval.

## 2023-08-03 NOTE — ASSESSMENT & PLAN NOTE
- Continue Pepcid 20 mg BID, protonix 20 mg BID.   Nonpharmacologic treatments include: eating smaller meals, elevation of the head of bed at night, avoidance of caffeine, chocolate, nicotine and peppermint, and avoiding tight fitting clothing.    - F/u GI

## 2023-08-06 DIAGNOSIS — E11.69 DIABETES MELLITUS TYPE 2 IN OBESE (HCC): ICD-10-CM

## 2023-08-06 DIAGNOSIS — E66.9 DIABETES MELLITUS TYPE 2 IN OBESE (HCC): ICD-10-CM

## 2023-08-09 ENCOUNTER — TELEPHONE (OUTPATIENT)
Dept: BARIATRICS | Facility: CLINIC | Age: 48
End: 2023-08-09

## 2023-08-11 ENCOUNTER — OFFICE VISIT (OUTPATIENT)
Dept: BARIATRICS | Facility: CLINIC | Age: 48
End: 2023-08-11

## 2023-08-11 VITALS — BODY MASS INDEX: 38.5 KG/M2 | HEIGHT: 63 IN | WEIGHT: 217.3 LBS

## 2023-08-11 DIAGNOSIS — R63.5 ABNORMAL WEIGHT GAIN: ICD-10-CM

## 2023-08-11 PROCEDURE — DB6PK

## 2023-08-11 PROCEDURE — RECHECK

## 2023-08-21 DIAGNOSIS — E11.69 DIABETES MELLITUS TYPE 2 IN OBESE (HCC): ICD-10-CM

## 2023-08-21 DIAGNOSIS — E66.9 DIABETES MELLITUS TYPE 2 IN OBESE (HCC): ICD-10-CM

## 2023-08-21 DIAGNOSIS — E03.9 HYPOTHYROIDISM, UNSPECIFIED TYPE: ICD-10-CM

## 2023-08-22 RX ORDER — LEVOTHYROXINE SODIUM 0.15 MG/1
150 TABLET ORAL DAILY
Qty: 90 TABLET | Refills: 0 | OUTPATIENT
Start: 2023-08-22

## 2023-10-20 DIAGNOSIS — E66.9 DIABETES MELLITUS TYPE 2 IN OBESE: ICD-10-CM

## 2023-10-20 DIAGNOSIS — E11.69 DIABETES MELLITUS TYPE 2 IN OBESE: ICD-10-CM

## 2023-10-31 DIAGNOSIS — E03.9 HYPOTHYROIDISM, UNSPECIFIED TYPE: ICD-10-CM

## 2023-10-31 DIAGNOSIS — K21.9 GASTROESOPHAGEAL REFLUX DISEASE WITHOUT ESOPHAGITIS: ICD-10-CM

## 2023-10-31 RX ORDER — LEVOTHYROXINE SODIUM 0.15 MG/1
150 TABLET ORAL DAILY
Qty: 90 TABLET | Refills: 1 | Status: SHIPPED | OUTPATIENT
Start: 2023-10-31

## 2023-10-31 RX ORDER — PANTOPRAZOLE SODIUM 20 MG/1
20 TABLET, DELAYED RELEASE ORAL 2 TIMES DAILY
Qty: 60 TABLET | Refills: 2 | Status: SHIPPED | OUTPATIENT
Start: 2023-10-31

## 2023-11-18 DIAGNOSIS — E11.69 DIABETES MELLITUS TYPE 2 IN OBESE: ICD-10-CM

## 2023-11-18 DIAGNOSIS — E66.9 DIABETES MELLITUS TYPE 2 IN OBESE: ICD-10-CM

## 2024-01-21 ENCOUNTER — TELEPHONE (OUTPATIENT)
Dept: BARIATRICS | Facility: CLINIC | Age: 49
End: 2024-01-21

## 2024-01-21 DIAGNOSIS — E11.69 DIABETES MELLITUS TYPE 2 IN OBESE: ICD-10-CM

## 2024-01-21 DIAGNOSIS — E66.9 DIABETES MELLITUS TYPE 2 IN OBESE: ICD-10-CM

## 2024-01-22 ENCOUNTER — APPOINTMENT (OUTPATIENT)
Dept: LAB | Facility: HOSPITAL | Age: 49
End: 2024-01-22
Payer: COMMERCIAL

## 2024-01-22 DIAGNOSIS — Z11.4 ENCOUNTER FOR SCREENING FOR HIV: ICD-10-CM

## 2024-01-22 LAB
HIV 1+2 AB+HIV1 P24 AG SERPL QL IA: NORMAL
HIV 2 AB SERPL QL IA: NORMAL
HIV1 AB SERPL QL IA: NORMAL
HIV1 P24 AG SERPL QL IA: NORMAL

## 2024-01-22 PROCEDURE — 87389 HIV-1 AG W/HIV-1&-2 AB AG IA: CPT

## 2024-01-22 NOTE — TELEPHONE ENCOUNTER
Has not been seen since July 2023 and has no upcoming appointments scheduled. Our office tried reaching out to her in November 2023 and was not able to reach her. She needs a nurse visit in the next 1-2 weeks and a scheduled follow-up with a medical weight management provider.

## 2024-01-23 DIAGNOSIS — E11.69 DIABETES MELLITUS TYPE 2 IN OBESE: ICD-10-CM

## 2024-01-23 DIAGNOSIS — E66.9 DIABETES MELLITUS TYPE 2 IN OBESE: ICD-10-CM

## 2024-01-23 NOTE — TELEPHONE ENCOUNTER
Patient stated that she will not be coming here because her insurance doesn't cover weight loss and she has to pay out of pocket. Patient stated that she will call her PCP to see they can prescribed the ozempic.

## 2024-02-05 ENCOUNTER — OFFICE VISIT (OUTPATIENT)
Dept: FAMILY MEDICINE CLINIC | Facility: CLINIC | Age: 49
End: 2024-02-05

## 2024-02-05 VITALS
DIASTOLIC BLOOD PRESSURE: 74 MMHG | TEMPERATURE: 98 F | HEIGHT: 63 IN | BODY MASS INDEX: 37.56 KG/M2 | OXYGEN SATURATION: 99 % | RESPIRATION RATE: 16 BRPM | HEART RATE: 100 BPM | SYSTOLIC BLOOD PRESSURE: 108 MMHG | WEIGHT: 212 LBS

## 2024-02-05 DIAGNOSIS — Z12.11 COLON CANCER SCREENING: ICD-10-CM

## 2024-02-05 DIAGNOSIS — E11.69 DIABETES MELLITUS TYPE 2 IN OBESE: Primary | ICD-10-CM

## 2024-02-05 DIAGNOSIS — E03.9 ACQUIRED HYPOTHYROIDISM: ICD-10-CM

## 2024-02-05 DIAGNOSIS — E66.9 DIABETES MELLITUS TYPE 2 IN OBESE: Primary | ICD-10-CM

## 2024-02-05 DIAGNOSIS — Z12.31 ENCOUNTER FOR SCREENING MAMMOGRAM FOR MALIGNANT NEOPLASM OF BREAST: ICD-10-CM

## 2024-02-05 DIAGNOSIS — E78.2 MIXED HYPERLIPIDEMIA: ICD-10-CM

## 2024-02-05 DIAGNOSIS — M54.50 CHRONIC BILATERAL LOW BACK PAIN WITHOUT SCIATICA: ICD-10-CM

## 2024-02-05 DIAGNOSIS — Z23 ENCOUNTER FOR IMMUNIZATION: ICD-10-CM

## 2024-02-05 DIAGNOSIS — G89.29 CHRONIC BILATERAL LOW BACK PAIN WITHOUT SCIATICA: ICD-10-CM

## 2024-02-05 PROCEDURE — 90471 IMMUNIZATION ADMIN: CPT

## 2024-02-05 PROCEDURE — 90686 IIV4 VACC NO PRSV 0.5 ML IM: CPT

## 2024-02-05 PROCEDURE — 99214 OFFICE O/P EST MOD 30 MIN: CPT

## 2024-02-05 RX ORDER — CYCLOBENZAPRINE HCL 10 MG
10 TABLET ORAL 3 TIMES DAILY PRN
Qty: 90 TABLET | Refills: 0 | Status: SHIPPED | OUTPATIENT
Start: 2024-02-05 | End: 2024-02-05

## 2024-02-05 RX ORDER — BACLOFEN 10 MG/1
10 TABLET ORAL 3 TIMES DAILY PRN
Qty: 90 TABLET | Refills: 0 | Status: SHIPPED | OUTPATIENT
Start: 2024-02-05

## 2024-02-05 RX ORDER — LIDOCAINE 40 MG/G
CREAM TOPICAL AS NEEDED
Qty: 28 G | Refills: 12 | Status: SHIPPED | OUTPATIENT
Start: 2024-02-05

## 2024-02-05 NOTE — ASSESSMENT & PLAN NOTE
Lab Results   Component Value Date    CHOLESTEROL 252 (H) 07/25/2023    CHOLESTEROL 200 09/28/2022    CHOLESTEROL 244 (H) 06/27/2022     Lab Results   Component Value Date    HDL 47 (L) 07/25/2023    HDL 55 09/28/2022    HDL 47 (L) 06/27/2022     Lab Results   Component Value Date    TRIG 398 (H) 07/25/2023    TRIG 240 (H) 09/28/2022    TRIG 249 (H) 06/27/2022     Lab Results   Component Value Date    NONHDLC 145 09/28/2022    NONHDLC 197 06/27/2022    NONHDLC 191 03/23/2022     Lab Results   Component Value Date    LDLCALC 125 (H) 07/25/2023     - continue lipitor 80 mg daily. Check lipid panel

## 2024-02-05 NOTE — ASSESSMENT & PLAN NOTE
Lab Results   Component Value Date    [id number] 5.729 (H) 01/22/2024     T4 within normal range.  Continue levothyroxine 150 mcg daily.

## 2024-02-05 NOTE — ASSESSMENT & PLAN NOTE
A1C acceptable.  Continue checking blood sugars at home daily.  Continue Ozempic 0.5 mg weekly.  Lab Results   Component Value Date    HGBA1C 6.0 (H) 01/22/2024

## 2024-02-05 NOTE — ASSESSMENT & PLAN NOTE
Continue drinking a minimum of 5 bottles of water daily.  Continue Topamax 50 mg 2 times a day to prevent migraines.  Schedule eye exam.

## 2024-02-05 NOTE — PROGRESS NOTES
Name: Betty Shane      : 1975      MRN: [id number]  Encounter Provider: LUZ Jarrett  Encounter Date: 2024   Encounter department: [facility]    Assessment & Plan     1. Diabetes mellitus type 2 in obese   Assessment & Plan:  A1C acceptable.  Continue checking blood sugars at home daily.  Continue Ozempic 0.5 mg weekly.  Lab Results   Component Value Date    HGBA1C 6.0 (H) 2024       Orders:  -     semaglutide, 0.25 or 0.5 mg/dose, (Ozempic, 0.25 or 0.5 MG/DOSE,) 2 mg/3 mL injection pen; Inject 0.75 mL (0.5 mg total) under the skin every 7 days  -     Ambulatory Referral to Ophthalmology; Future  -     Albumin / creatinine urine ratio; Future    2. Encounter for immunization  -     influenza vaccine, quadrivalent, 0.5 mL, preservative-free, for adult and pediatric patients 6 mos+ (AFLURIA, FLUARIX, FLULAVAL, FLUZONE)    3. Chronic bilateral low back pain without sciatica  Assessment & Plan:  Schedule PT.  Baclofen prn. Apply Lidocaine cream as needed for back pain.    Orders:  -     Ambulatory Referral to Comprehensive Sp[facility] Program; Future  -     lidocaine (LMX) 4 % cream; Apply topically as needed for mild pain  -     baclofen 10 mg tablet; Take 1 tablet (10 mg total) by mouth 3 (three) times a day as needed for muscle spasms    4. Encounter for screening mammogram for malignant neoplasm of breast  -     Mammo screening bilateral w 3d & cad; Future; Expected date: 2024    5. Colon cancer screening  -     Ambulatory Referral to Gastroenterology; Future    6. Acquired hypothyroidism  Assessment & Plan:  Lab Results   Component Value Date    NOL5QDHQCLPC 5.729 (H) 2024     T4 within normal range.  Continue levothyroxine 150 mcg daily.      7. Mixed hyperlipidemia  Assessment & Plan:  Lab Results   Component Value Date    CHOLESTEROL 252 (H) 2023    CHOLESTEROL 200 2022    CHOLESTEROL 244 (H) 2022     Lab  Results   Component Value Date    HDL 47 (L) 07/25/2023    HDL 55 09/28/2022    HDL 47 (L) 06/27/2022     Lab Results   Component Value Date    TRIG 398 (H) 07/25/2023    TRIG 240 (H) 09/28/2022    TRIG 249 (H) 06/27/2022     Lab Results   Component Value Date    NONHDLC 145 09/28/2022    NONHDLC 197 06/27/2022    NONHDLC 191 03/23/2022     Lab Results   Component Value Date    LDLCALC 125 (H) 07/25/2023     - continue lipitor 80 mg daily. Check lipid panel    Orders:  -     Lipid panel; Future           Subjective      Betty Shane is a 48 y.o. female  has a past medical history of Ankle swelling, Calf pain, Constipation, Diabetes mellitus (HCC), Disease of thyroid gland, Double vision, GERD (gastroesophageal reflux disease), H/O: hysterectomy, Headache, History of hysterectomy, History of hysterectomy, Hypertension, Joint pain, Numbness, Palpitations, Polycystic ovary syndrome, and SOB (shortness of breath).  has a past surgical history that includes Hysterectomy.        Diabetes Mellitus  Patient presents for follow up of diabetes. Current symptoms include: none. Symptoms have gradually improved. Patient denies hyperglycemia, hypoglycemia , and nausea. Evaluation to date has included: hemoglobin A1C.  Home sugars: BGs consistently in an acceptable range. Current treatment: Ozempic 0.75ml. Last dilated eye exam: over 1 year.    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  Pt reports standing 9 hours while working.  Pt reports chronic lower back pain/aches and spasms that increased in severity 2 weeks ago.  Pt reports 8/10 pain, worse while working/on her feet.  Pt reports motrin and Robaxin offers some relief.  Pt reports heating pad offers minimal relief.        Review of Systems   Constitutional:  Negative for chills and fever.   HENT:  Negative for ear pain and sore throat.    Eyes:  Negative for pain and visual disturbance.   Respiratory:  Negative for cough and shortness of breath.   "  Cardiovascular:  Negative for chest pain and palpitations.   Gastrointestinal:  Negative for abdominal pain and vomiting.   Genitourinary:  Negative for dysuria and hematuria.   Musculoskeletal:  Positive for back pain. Negative for arthralgias.   Skin:  Negative for color change and rash.   Neurological:  Negative for seizures and syncope.   All other systems reviewed and are negative.      Current Outpatient Medications on File Prior to Visit   Medication Sig    atorvastatin (LIPITOR) 80 mg tablet Take 1 tablet (80 mg total) by mouth daily    famotidine (PEPCID) 20 mg tablet Take 1 tablet (20 mg total) by mouth 2 (two) times a day    levothyroxine 150 mcg tablet TAKE 1 TABLET BY MOUTH EVERY DAY    pantoprazole (PROTONIX) 20 mg tablet TAKE 1 TABLET BY MOUTH TWICE A DAY    polyethylene glycol (GLYCOLAX) 17 GM/SCOOP powder Take 17 g by mouth 2 (two) times a day    topiramate (Topamax) 50 MG tablet Take 1 tablet (50 mg total) by mouth 2 (two) times a day    [DISCONTINUED] melatonin 3 mg Take 1 tablet (3 mg total) by mouth daily at bedtime    [DISCONTINUED] methocarbamol (ROBAXIN) 500 mg tablet Take 1 tablet (500 mg total) by mouth 2 (two) times a day as needed for muscle spasms    [DISCONTINUED] semaglutide, 0.25 or 0.5 mg/dose, (Ozempic, 0.25 or 0.5 MG/DOSE,) 2 mg/3 mL injection pen Inject 0.75 mL (0.5 mg total) under the skin every 7 days       Objective     /74 (BP Location: Left arm, Patient Position: Sitting, Cuff Size: Large)   Pulse 100   Temp 98 °F (36.7 °C) (Temporal)   Resp 16   Ht 5' 3\" (1.6 m)   Wt 96.2 kg (212 lb)   SpO2 99%   BMI 37.55 kg/m²     Physical Exam  Vitals and nursing note reviewed.   Constitutional:       Appearance: Normal appearance. She is obese.   HENT:      Head: Normocephalic and atraumatic.      Right Ear: External ear normal.      Left Ear: External ear normal.      Nose: Nose normal.      Mouth/Throat:      Mouth: Mucous membranes are moist.      Pharynx: Oropharynx is " clear.   Eyes:      Extraocular Movements: Extraocular movements intact.      Conjunctiva/sclera: Conjunctivae normal.      Pupils: Pupils are equal, round, and reactive to light.   Cardiovascular:      Rate and Rhythm: Normal rate and regular rhythm.      Pulses: Normal pulses.      Heart sounds: Normal heart sounds.   Pulmonary:      Effort: Pulmonary effort is normal.      Breath sounds: Normal breath sounds.   Abdominal:      General: Bowel sounds are normal.      Palpations: Abdomen is soft.      Tenderness: There is no abdominal tenderness.   Musculoskeletal:         General: Tenderness present. Normal range of motion.      Cervical back: Normal range of motion and neck supple.   Skin:     General: Skin is warm and dry.   Neurological:      General: No focal deficit present.      Mental Status: She is alert and oriented to person, place, and time. Mental status is at baseline.   Psychiatric:         Mood and Affect: Mood normal.         Behavior: Behavior normal.         Thought Content: Thought content normal.         Judgment: Judgment normal.       LUZ Jarrett

## 2024-02-06 ENCOUNTER — NURSE TRIAGE (OUTPATIENT)
Dept: PHYSICAL THERAPY | Facility: OTHER | Age: 49
End: 2024-02-06

## 2024-02-06 DIAGNOSIS — M54.50 ACUTE EXACERBATION OF CHRONIC LOW BACK PAIN: Primary | ICD-10-CM

## 2024-02-06 DIAGNOSIS — G89.29 ACUTE EXACERBATION OF CHRONIC LOW BACK PAIN: Primary | ICD-10-CM

## 2024-02-06 NOTE — TELEPHONE ENCOUNTER
"Additional Information  • Negative: Is this related to a work injury?  • Negative: Is this related to an MVA?  • Negative: Are you currently recieving homecare services?    Background - Initial Assessment  Clinical complaint: B/L Lower Back pain. Hx of back pain for over a year. Worsening 2 weeks ago. Patient states pain radiates to her upper back, into her hips, both legs mainly the right leg. Numbness and tingling in both hands and feet. NKI. Saw a Chiropractor over a year ago. Seen by PCP yesterday who referred patient to [facility] for PT. Patient states pain is constant \"sometimes I feel  muscle knots in my back and upper shoulders\". Patient described pain as burning and painful.  Date of onset: over a year ago, worsening 2 weeks ago.  Frequency of pain: constant  Quality of pain: burning, numb, tingling and painful.    Protocols used: [facility] Protocol    "

## 2024-02-06 NOTE — TELEPHONE ENCOUNTER
Piotr Durbin PSR assisted with interpretation.    Additional Information   Negative: Has the patient had unexplained weight loss?   Negative: Does the patient have a fever?   Negative: Is the patient experiencing urine retention?   Negative: Is the patient experiencing acute drop foot or paralysis?   Negative: Has the patient experienced major trauma? (fall from height, high speed collision, direct blow to spine) and is also experiencing nausea, light-headedness, or loss of consciousness?   Negative: Is the patient experiencing blood in sputum?   Affirmative: Is this a chronic condition?    Protocols used: [facility] Protocol    This RN did review in detail the [facility] and what we can provide for their back pain.  Patient is agreeable to being triaged by this RN and would like to proceed with Physical Therapy.    Referral was placed for Physical Therapy at the [facility] site. Patients information was sent to the  to make evaluation appointment. Patient made aware that the PT office  will be calling to schedule the appointment.  Patient was provided with the phone number to the PT office.    No further questions and/or concerns were voiced by the patient at this time. Patient states understanding of the referral that was placed.    Referral Closed.

## 2024-02-08 ENCOUNTER — PREP FOR PROCEDURE (OUTPATIENT)
Age: 49
End: 2024-02-08

## 2024-02-08 ENCOUNTER — TELEPHONE (OUTPATIENT)
Age: 49
End: 2024-02-08

## 2024-02-08 DIAGNOSIS — Z12.11 SCREENING FOR COLON CANCER: Primary | ICD-10-CM

## 2024-02-08 NOTE — TELEPHONE ENCOUNTER
Scheduled date of colonoscopy (as of today): 04/30/24  Physician performing colonoscopy: FLOR  Location of colonoscopy: [facility]  Bowel prep reviewed with patient: CH[facility]STEN[facility] DIABETIC  Instructions reviewed with patient by: MAIL  Clearances: DIABETIC    : QUINCY [phone]

## 2024-03-03 DIAGNOSIS — K21.9 GASTROESOPHAGEAL REFLUX DISEASE WITHOUT ESOPHAGITIS: ICD-10-CM

## 2024-03-03 DIAGNOSIS — G43.919 INTRACTABLE MIGRAINE WITHOUT STATUS MIGRAINOSUS, UNSPECIFIED MIGRAINE TYPE: ICD-10-CM

## 2024-03-04 RX ORDER — TOPIRAMATE 50 MG/1
50 TABLET, FILM COATED ORAL 2 TIMES DAILY
Qty: 60 TABLET | Refills: 5 | Status: SHIPPED | OUTPATIENT
Start: 2024-03-04

## 2024-03-04 RX ORDER — FAMOTIDINE 20 MG/1
20 TABLET, FILM COATED ORAL 2 TIMES DAILY
Qty: 60 TABLET | Refills: 5 | Status: SHIPPED | OUTPATIENT
Start: 2024-03-04

## 2024-03-05 DIAGNOSIS — K21.9 GASTROESOPHAGEAL REFLUX DISEASE WITHOUT ESOPHAGITIS: ICD-10-CM

## 2024-03-05 RX ORDER — PANTOPRAZOLE SODIUM 20 MG/1
20 TABLET, DELAYED RELEASE ORAL 2 TIMES DAILY
Qty: 180 TABLET | Refills: 1 | Status: SHIPPED | OUTPATIENT
Start: 2024-03-05

## 2024-03-25 DIAGNOSIS — E11.69 DIABETES MELLITUS TYPE 2 IN OBESE: ICD-10-CM

## 2024-03-25 DIAGNOSIS — E66.9 DIABETES MELLITUS TYPE 2 IN OBESE: ICD-10-CM

## 2024-04-01 DIAGNOSIS — K21.9 GASTROESOPHAGEAL REFLUX DISEASE WITHOUT ESOPHAGITIS: ICD-10-CM

## 2024-04-01 RX ORDER — FAMOTIDINE 20 MG/1
20 TABLET, FILM COATED ORAL 2 TIMES DAILY
Qty: 180 TABLET | Refills: 2 | Status: SHIPPED | OUTPATIENT
Start: 2024-04-01

## 2024-04-22 DIAGNOSIS — E11.69 DIABETES MELLITUS TYPE 2 IN OBESE: ICD-10-CM

## 2024-04-22 DIAGNOSIS — E66.9 DIABETES MELLITUS TYPE 2 IN OBESE: ICD-10-CM

## 2024-06-03 ENCOUNTER — OFFICE VISIT (OUTPATIENT)
Dept: FAMILY MEDICINE CLINIC | Facility: CLINIC | Age: 49
End: 2024-06-03

## 2024-06-03 VITALS
HEIGHT: 63 IN | TEMPERATURE: 97.5 F | BODY MASS INDEX: 38.09 KG/M2 | HEART RATE: 92 BPM | RESPIRATION RATE: 16 BRPM | WEIGHT: 215 LBS | DIASTOLIC BLOOD PRESSURE: 82 MMHG | SYSTOLIC BLOOD PRESSURE: 120 MMHG | OXYGEN SATURATION: 97 %

## 2024-06-03 DIAGNOSIS — M72.2 PLANTAR FASCIITIS OF RIGHT FOOT: Primary | ICD-10-CM

## 2024-06-03 DIAGNOSIS — K21.9 GASTROESOPHAGEAL REFLUX DISEASE WITHOUT ESOPHAGITIS: ICD-10-CM

## 2024-06-03 DIAGNOSIS — E03.9 HYPOTHYROIDISM, UNSPECIFIED TYPE: ICD-10-CM

## 2024-06-03 DIAGNOSIS — E66.9 TYPE 2 DIABETES MELLITUS WITH OBESITY  (HCC): ICD-10-CM

## 2024-06-03 DIAGNOSIS — E11.69 TYPE 2 DIABETES MELLITUS WITH OBESITY  (HCC): ICD-10-CM

## 2024-06-03 DIAGNOSIS — Z11.59 NEED FOR HEPATITIS C SCREENING TEST: ICD-10-CM

## 2024-06-03 DIAGNOSIS — R53.83 OTHER FATIGUE: ICD-10-CM

## 2024-06-03 DIAGNOSIS — R29.818 SUSPECTED SLEEP APNEA: ICD-10-CM

## 2024-06-03 PROCEDURE — 99214 OFFICE O/P EST MOD 30 MIN: CPT

## 2024-06-03 RX ORDER — NAPROXEN 500 MG/1
500 TABLET ORAL 2 TIMES DAILY WITH MEALS
Qty: 28 TABLET | Refills: 0 | Status: SHIPPED | OUTPATIENT
Start: 2024-06-03 | End: 2024-06-17

## 2024-06-03 RX ORDER — PANTOPRAZOLE SODIUM 40 MG/1
40 TABLET, DELAYED RELEASE ORAL
Qty: 90 TABLET | Refills: 3 | Status: SHIPPED | OUTPATIENT
Start: 2024-06-03 | End: 2025-05-29

## 2024-06-03 NOTE — PROGRESS NOTES
Adult Annual Physical  Name: Betty Shane      : 1975      MRN: [id number]  Encounter Provider: LUZ Jarrett  Encounter Date: 6/3/2024   Encounter department: [facility]    Assessment & Plan   1. Plantar fasciitis of right foot  -     Ambulatory Referral to Podiatry; Future  -     naproxen (Naprosyn) 500 mg tablet; Take 1 tablet (500 mg total) by mouth 2 (two) times a day with meals for 14 days  2. Type 2 diabetes mellitus with obesity  (HCC)  -     Hemoglobin A1C; Future  -     Basic metabolic panel; Future  3. Gastroesophageal reflux disease without esophagitis  -     pantoprazole (PROTONIX) 40 mg tablet; Take 1 tablet (40 mg total) by mouth daily before breakfast  4. Need for hepatitis C screening test  5. Suspected sleep apnea  -     Ambulatory Referral to Sleep Medicine; Future  6. Other fatigue  -     CBC and differential; Future  -     TSH, 3rd generation with Free T4 reflex; Future  -     Chronic Hepatitis Panel; Future  7. Hypothyroidism, unspecified type  -     TSH, 3rd generation with Free T4 reflex; Future    Immunizations and preventive care screenings were discussed with patient today. Appropriate education was printed on patient's after visit summary.    Counseling:  Dental Health: discussed importance of regular tooth brushing, flossing, and dental visits.  Exercise: the importance of regular exercise/physical activity was discussed. Recommend exercise 3-5 times per week for at least 30 minutes.     BMI Counseling: Body mass index is 38.09 kg/m². The BMI is above normal. Nutrition recommendations include decreasing portion sizes, encouraging healthy choices of fruits and vegetables, decreasing fast food intake, consuming healthier snacks, limiting drinks that contain sugar, moderation in carbohydrate intake, increasing intake of lean protein, reducing intake of saturated and trans fat and reducing intake of cholesterol. Exercise  "recommendations include moderate physical activity 150 minutes/week. No pharmacotherapy was ordered. Rationale for BMI follow-up plan is due to patient being overweight or obese.         History of Present Illness     Adult Annual Physical:  Patient presents for annual physical. She presents today for follow-up of right foot pain, mostly in heel, tender to palpation, worse in the morning when she takes first steps for the day. Tried tylenol, ice application, massage, different shoes and foot inserts with no relief. XRs in the past were normal. 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    She also reports that she feels as though pepcid and protonix 30 mg BID are not helping her GERD regardless of what she eats. She cannot do EGD because insurance co-pay is $2500.    She is also concerned because she feels fatigued even after sleeping for 8 hours. Associated symptoms: snoring, witnessed gasping. Risk factors for BG: obesity.     Diet and Physical Activity:  - Diet/Nutrition: well balanced diet.  - Exercise: walking and no formal exercise.    Depression Screening:  - PHQ-2 Score: 0    General Health:  - Sleep: sleeps poorly, unrefreshing sleep, daytime hypersomnolence, snores loudly, witnessed gasping and 4-6 hours of sleep on average.  - Hearing: normal hearing bilateral ears.  - Vision: no vision problems and most recent eye exam > 1 year ago.  - Dental: regular dental visits, brushes teeth twice daily and brushes teeth three times daily.    /GYN Health:  - Follows with GYN: no.   - Menopause: postmenopausal.   - History of STDs: no  - Contraception:. hysterectomy      Review of Systems  As per HPI        Objective     /82 (BP Location: Left arm, Patient Position: Sitting, Cuff Size: Standard)   Pulse 92   Temp 97.5 °F (36.4 °C) (Temporal)   Resp 16   Ht 5' 3\" (1.6 m)   Wt 97.5 kg (215 lb)   SpO2 97%   BMI 38.09 kg/m²     Physical Exam  Vitals and nursing note reviewed. "   Constitutional:       General: She is not in acute distress.     Appearance: Normal appearance. She is well-developed. She is obese.   HENT:      Head: Normocephalic and atraumatic.      Right Ear: External ear normal.      Left Ear: External ear normal.      Nose: Nose normal.   Eyes:      Conjunctiva/sclera: Conjunctivae normal.   Cardiovascular:      Rate and Rhythm: Normal rate and regular rhythm.      Pulses: Normal pulses.           Dorsalis pedis pulses are 2+ on the right side and 2+ on the left side.        Posterior tibial pulses are 2+ on the right side and 2+ on the left side.      Heart sounds: Normal heart sounds. No murmur heard.  Pulmonary:      Effort: Pulmonary effort is normal. No respiratory distress.      Breath sounds: Normal breath sounds.   Abdominal:      General: Bowel sounds are normal.      Palpations: Abdomen is soft.      Tenderness: There is no abdominal tenderness.   Musculoskeletal:         General: Normal range of motion.      Cervical back: Normal range of motion and neck supple.   Feet:      Right foot:      Skin integrity: Skin integrity normal.      Toenail Condition: Right toenails are normal.      Left foot:      Skin integrity: Skin integrity normal.      Toenail Condition: Left toenails are normal.      Comments: Right heel tender to palpation  Skin:     General: Skin is warm and dry.   Neurological:      General: No focal deficit present.      Mental Status: She is alert and oriented to person, place, and time. Mental status is at baseline.   Psychiatric:         Mood and Affect: Mood normal.         Behavior: Behavior normal.         Thought Content: Thought content normal.         Judgment: Judgment normal.       Administrative Statements

## 2024-06-05 ENCOUNTER — HOSPITAL ENCOUNTER (OUTPATIENT)
Dept: MAMMOGRAPHY | Facility: CLINIC | Age: 49
Discharge: HOME | End: 2024-06-05
Payer: COMMERCIAL

## 2024-06-05 VITALS — WEIGHT: 215 LBS | HEIGHT: 63 IN | BODY MASS INDEX: 38.09 KG/M2

## 2024-06-05 DIAGNOSIS — Z12.31 ENCOUNTER FOR SCREENING MAMMOGRAM FOR MALIGNANT NEOPLASM OF BREAST: ICD-10-CM

## 2024-06-05 PROCEDURE — 77063 BREAST TOMOSYNTHESIS BI: CPT

## 2024-06-05 PROCEDURE — 77067 SCR MAMMO BI INCL CAD: CPT

## 2024-06-12 ENCOUNTER — OFFICE VISIT (OUTPATIENT)
Dept: PODIATRY | Facility: CLINIC | Age: 49
End: 2024-06-12
Payer: COMMERCIAL

## 2024-06-12 VITALS — RESPIRATION RATE: 18 BRPM | HEIGHT: 63 IN | BODY MASS INDEX: 38.09 KG/M2

## 2024-06-12 DIAGNOSIS — M72.2 PLANTAR FASCIITIS OF RIGHT FOOT: Primary | ICD-10-CM

## 2024-06-12 PROCEDURE — 99203 OFFICE O/P NEW LOW 30 MIN: CPT | Performed by: PODIATRIST

## 2024-06-12 PROCEDURE — 20550 NJX 1 TENDON SHEATH/LIGAMENT: CPT | Performed by: PODIATRIST

## 2024-06-12 RX ORDER — TRIAMCINOLONE ACETONIDE 40 MG/ML
40 INJECTION, SUSPENSION INTRA-ARTICULAR; INTRAMUSCULAR
Status: SHIPPED | OUTPATIENT
Start: 2024-06-12

## 2024-06-12 RX ORDER — LIDOCAINE HYDROCHLORIDE 10 MG/ML
1 INJECTION, SOLUTION INFILTRATION; PERINEURAL
Status: SHIPPED | OUTPATIENT
Start: 2024-06-12

## 2024-06-12 RX ADMIN — LIDOCAINE HYDROCHLORIDE 1 ML: 10 INJECTION, SOLUTION INFILTRATION; PERINEURAL at 08:30

## 2024-06-12 RX ADMIN — TRIAMCINOLONE ACETONIDE 40 MG: 40 INJECTION, SUSPENSION INTRA-ARTICULAR; INTRAMUSCULAR at 08:30

## 2024-06-12 NOTE — PROGRESS NOTES
Ambulatory Visit  Name: Betty Shane      : 1975      MRN: [id number]  Encounter Provider: Alban Enriquez DPM  Encounter Date: 2024   Encounter department: [facility]    Assessment & Plan   1. Plantar fasciitis of right foot  -     Ambulatory Referral to Podiatry  -     Foot/lower extremity injection  -     lidocaine (XYLOCAINE) 1 % injection 1 mL  -     triamcinolone acetonide (KENALOG-40) 40 mg/mL injection 40 mg  Diagnosis and options discussed with patient  Patient agreeable to the plan as stated below    1. Discussed diagnosis and treatment options  2. Provided home therapy and stretching exercises  3. Stressed compliance with home/formal PT and arch supports.    Foot/lower extremity injection    Performed by: Alban Enriquez DPM  Authorized by: Alban Enriquez DPM    Procedure:     Verbal consent obtained?: Yes      Risks and benefits: Risks, benefits and alternatives were discussed      Consent given by:  Patient    Time out: Immediately prior to the procedure a time out was called      Time out performed at:  2024 8:59 AM    Patient states understanding of procedure being performed: Yes      Patient identity confirmed:  Verbally with patient    Supporting Documentation:     Indications:  Therapeutic and pain    Procedure Details:      Needle size: 25 G G    Approach:  Medial    Laterality:  Right    Location: aponeurosis      Aponeurosis Structures: Plantar fascia origin      Injection Information:       Medications:  1 mL lidocaine 1 %; 40 mg triamcinolone acetonide 40 mg/mL          History of Present Illness     Betty Shane is a 48 y.o. female who presents with right heel pain for over a year. She had an XR last year. She was told to do stretches which she did the stretches about once a day for maybe 2 weeks. They recommended some inserts in shoes but they didn't help. She did not have any injections. She works on her  "feet.    Review of Systems  As stated in HPI, otherwise normal    Medical History Reviewed by provider this encounter:  Tobacco  Allergies  Meds  Problems  Med Hx  Surg Hx  Fam Hx        Objective     Resp 18   Ht 5' 3\" (1.6 m)   BMI 38.09 kg/m²     Physical Exam  Vitals reviewed.   Constitutional:       Appearance: She is obese.   Cardiovascular:      Rate and Rhythm: Normal rate.      Pulses: Normal pulses.   Pulmonary:      Effort: Pulmonary effort is normal. No respiratory distress.   Musculoskeletal:         General: Tenderness (sharp pain right plantar fascia instertion. No significant joint restrictions.) present. No deformity.   Skin:     General: Skin is warm.      Findings: No erythema or rash.   Neurological:      Mental Status: She is alert.      Sensory: No sensory deficit.       Administrative Statements           "

## 2024-06-12 NOTE — PATIENT INSTRUCTIONS
Ejercicios para la fascitis plantar   LO QUE NECESITA SABER:   Los ejercicios para la fascitis plantar ayudan a estirar la fascia plantar, los músculos de la pantorrilla y el tendón de Jonatan. También ayudan a fortalecer los músculos que dan soporte a los talones y los pies. Los ejercicios y el estiramiento pueden ayudar a evitar que la fascitis plantar empeore o regrese.       INSTRUCCIONES SOBRE [facility]:   Llame a lewis médico o fisioterapeuta si:  Lewis dolor e inflamación aumentan    Usted desarrolla un nuevo dolor en la rodilla, cadera o espalda.    Usted tiene preguntas o inquietudes acerca de lewis condición o cuidado.    Cómo hacer ejercicios para la fascitis plantar: Pregunte a lewis médico cuándo debe comenzar a hacer estos ejercicios y con qué frecuencia debe realizarlos.  Estiramiento en tabla inclinada: Párese sobre ari tabla inclinada con los dedos de los pies más elevados que lewis talón. Presione el talón contra la tabla. Mantenga lewis rodilla ligeramente flexionada. Mantenga esta posición jorge 1 minuto. Repita 5 veces.         Estiramiento del talón: Párese derecho con las christ sobre la pared. Coloque la pierna lesionada ligeramente detrás de la otra pierna. Mantenga los talones sobre el piso, inclínese hacia adelante y doble ambas rodillas. Sostenga está posición por 30 segundos.         Estiramiento de la pantorrilla: Párese derecho con las christ sobre la pared. [place] un paso hacia adelante de forma que lewis pie no lastimado se encuentre en frente de lewis pie lastimado. Mantenga la pierna de adelante doblada y la pierna de atrás derecha. Cuidadosamente inclínese hacia adelante hasta que sienta lewis pantorrilla estirarse. Sostenga la tensión por 30 segundos y luego afloje.         Estiramiento de la fascia plantar sentado: Siéntese en ari superficie firme, andrew el piso o ari alfombra. Extienda las piernas hacia adelante. Eleve el pie lesionado unas pocas pulgadas por encima del suelo. Mantenga lewis pierna  derecha. Agárrese los dedos del pie lesionado y llévelos hacia usted. Con la otra mano, tóquese la fascia plantar. Debe sentir que hace presión hacia afuera. Sostenga está posición por 30 segundos. Si no puede llegar a los dedos, coloque ari toalla o ari corbata alrededor de lewis pie. Tire suavemente de la toalla o la corbata y flexione los dedos hacia usted.         Levantamiento de talones: Párese sobre la pierna lesionada. Levante la otra pierna del suelo. Agárrese de ari baranda o ari pared para mantener el equilibrio. Levántese lentamente sobre los dedos de la pierna lesionada. Sostenga está posición por 5 segundos. Baje el talón despacio hacia el piso.         Enrollar los dedos del pie: Coloque ari toalla sobre el piso. Ponga lewis pie plano sobre la toalla. Agarre la toalla con los dedos del pie. Levante la toalla con los dedos del pie.         Golpecitos con el dedo eri del pie: Siéntese y coloque martha pies en posición plana sobre el piso. Mantenga lewis talón en el suelo. Apunte con los dedos de lewis pie hacia el techo. Con martha 4 dedos más pequeños apuntando hacia arriba, doble lewis dedo eri hacia abajo y dé golpecitos suaves contra el piso. Dé entre 10 y 50 golpecitos. Apunte todos los 5 dedos del pie hacia el techo nuevamente. Esta vez, deje el dedo eri apuntando hacia arriba y dé golpecitos en el piso con los 4 dedos más pequeños. Dé entre carla y cincuenta golpecitos cada vez.       Acuda a martha consultas de control con lewis médico o fisioterapeuta según le indicaron: Anote martha preguntas para que se acuerde de hacerlas jorge martha visitas.  © Copyright [employer] 2023 Information is for End User's use only and may not be sold, redistributed or otherwise used for commercial purposes.  Esta información es sólo para uso en educación. Lewis intención no es darle un consejo médico sobre enfermedades o tratamientos. Colsulte con lewis médico, enfermera o farmacéutico antes de seguir cualquier régimen médico para saber si es  seguro y efectivo para usted.

## 2024-06-14 DIAGNOSIS — E03.9 HYPOTHYROIDISM, UNSPECIFIED TYPE: ICD-10-CM

## 2024-06-14 RX ORDER — LEVOTHYROXINE SODIUM 0.15 MG/1
150 TABLET ORAL DAILY
Qty: 30 TABLET | Refills: 5 | Status: SHIPPED | OUTPATIENT
Start: 2024-06-14

## 2024-07-09 DIAGNOSIS — E03.9 HYPOTHYROIDISM, UNSPECIFIED TYPE: ICD-10-CM

## 2024-07-09 RX ORDER — LEVOTHYROXINE SODIUM 0.15 MG/1
150 TABLET ORAL DAILY
Qty: 90 TABLET | Refills: 2 | Status: SHIPPED | OUTPATIENT
Start: 2024-07-09

## 2024-07-24 ENCOUNTER — OFFICE VISIT (OUTPATIENT)
Dept: PODIATRY | Facility: CLINIC | Age: 49
End: 2024-07-24
Payer: COMMERCIAL

## 2024-07-24 VITALS — HEIGHT: 63 IN | BODY MASS INDEX: 38.09 KG/M2 | RESPIRATION RATE: 18 BRPM

## 2024-07-24 DIAGNOSIS — M72.2 PLANTAR FASCIITIS OF RIGHT FOOT: Primary | ICD-10-CM

## 2024-07-24 PROCEDURE — RECHECK: Performed by: PODIATRIST

## 2024-07-24 PROCEDURE — 20550 NJX 1 TENDON SHEATH/LIGAMENT: CPT | Performed by: PODIATRIST

## 2024-07-24 RX ORDER — TRIAMCINOLONE ACETONIDE 40 MG/ML
40 INJECTION, SUSPENSION INTRA-ARTICULAR; INTRAMUSCULAR
Status: SHIPPED | OUTPATIENT
Start: 2024-07-24

## 2024-07-24 RX ORDER — LIDOCAINE HYDROCHLORIDE 10 MG/ML
1 INJECTION, SOLUTION INFILTRATION; PERINEURAL
Status: SHIPPED | OUTPATIENT
Start: 2024-07-24

## 2024-07-24 RX ADMIN — TRIAMCINOLONE ACETONIDE 40 MG: 40 INJECTION, SUSPENSION INTRA-ARTICULAR; INTRAMUSCULAR at 08:15

## 2024-07-24 RX ADMIN — LIDOCAINE HYDROCHLORIDE 1 ML: 10 INJECTION, SOLUTION INFILTRATION; PERINEURAL at 08:15

## 2024-07-24 NOTE — PROGRESS NOTES
Assessment/Plan:      Diagnoses and all orders for this visit:    Plantar fasciitis of right foot  -     Foot/lower extremity injection  -     lidocaine (XYLOCAINE) 1 % injection 1 mL  -     triamcinolone acetonide (KENALOG-40) 40 mg/mL injection 40 mg      Seeing improvement, I offered to take her out of work which she declined. I then recommended a second injection. Stressed need to wear sneakers, she is in sandals today. She also needs to stretch as often as possible.     Foot/lower extremity injection    Performed by: Alban Enriquez DPM  Authorized by: Alban Enriquez DPM    Procedure:     Verbal consent obtained?: Yes      Risks and benefits: Risks, benefits and alternatives were discussed      Consent given by:  Patient    Time out: Immediately prior to the procedure a time out was called      Time out performed at:  7/24/2024 8:16 AM    Patient states understanding of procedure being performed: Yes      Patient identity confirmed:  Verbally with patient    Supporting Documentation:     Indications:  Joint swelling and pain    Procedure Details:      Needle size: 25 G G    Approach:  Plantar    Laterality:  Right    Location: aponeurosis      Aponeurosis Structures: Plantar fascia origin      Injection Information:       Medications:  40 mg triamcinolone acetonide 40 mg/mL; 1 mL lidocaine 1 %    Comments:      After answering all the patient's questions, I injected right heel heel with 0.5cc kenalog 40 and 1cc 1% lidocaine plain.  Oral directions were given for rest and ice on the affected heel(s) and elevation. The ice is to be used for approximately 20 minutes at a time, 1-2 times a day for a few days.  Home therapy instructions were demonstrated and a copy was given to the patient. The patient is to call at once if there is any increased pain, swelling, tenderness, redness, etc.          Subjective:     Patient ID: Betty Shane is a 48 y.o. female.    F/U right heel pain. She got a  injection. She is seeing improvement but her job is very physical.         Review of Systems    As stated in HPI, otherwise normal    Medical History Reviewed by provider this encounter:  Tobacco  Allergies  Meds  Problems  Med Hx  Surg Hx  Fam Hx        Objective:     Physical Exam  Vitals reviewed.   Constitutional:       Appearance: She is not ill-appearing or diaphoretic.   Cardiovascular:      Rate and Rhythm: Normal rate.      Pulses: Normal pulses.   Pulmonary:      Effort: Pulmonary effort is normal. No respiratory distress.   Musculoskeletal:         General: Tenderness (medial plantar fascia tenderness right. Normal STJ ROM, ankle equinus mild) present.   Skin:     Capillary Refill: Capillary refill takes less than 2 seconds.      Findings: No erythema or rash.   Neurological:      Mental Status: She is alert and oriented to person, place, and time.      Sensory: No sensory deficit.      Gait: Gait normal.   Psychiatric:         Mood and Affect: Mood normal.

## 2024-08-01 ENCOUNTER — APPOINTMENT (OUTPATIENT)
Dept: LAB | Facility: HOSPITAL | Age: 49
End: 2024-08-01
Payer: COMMERCIAL

## 2024-08-01 DIAGNOSIS — E66.9 TYPE 2 DIABETES MELLITUS WITH OBESITY  (HCC): ICD-10-CM

## 2024-08-01 DIAGNOSIS — R53.83 OTHER FATIGUE: ICD-10-CM

## 2024-08-01 DIAGNOSIS — E03.9 HYPOTHYROIDISM, UNSPECIFIED TYPE: ICD-10-CM

## 2024-08-01 DIAGNOSIS — E11.69 TYPE 2 DIABETES MELLITUS WITH OBESITY  (HCC): ICD-10-CM

## 2024-08-01 LAB
ANION GAP SERPL CALCULATED.3IONS-SCNC: 6 MMOL/L (ref 4–13)
BASOPHILS # BLD AUTO: 0.03 THOUSANDS/ÂΜL (ref 0–0.1)
BASOPHILS NFR BLD AUTO: 0 % (ref 0–1)
BUN SERPL-MCNC: 15 MG/DL (ref 5–25)
CALCIUM SERPL-MCNC: 9.2 MG/DL (ref 8.4–10.2)
CHLORIDE SERPL-SCNC: 104 MMOL/L (ref 96–108)
CO2 SERPL-SCNC: 30 MMOL/L (ref 21–32)
CREAT SERPL-MCNC: 0.81 MG/DL (ref 0.6–1.3)
EOSINOPHIL # BLD AUTO: 0.11 THOUSAND/ÂΜL (ref 0–0.61)
EOSINOPHIL NFR BLD AUTO: 1 % (ref 0–6)
ERYTHROCYTE [DISTWIDTH] IN BLOOD BY AUTOMATED COUNT: 13.4 % (ref 11.6–15.1)
EST. AVERAGE GLUCOSE BLD GHB EST-MCNC: 134 MG/DL
GFR SERPL CREATININE-BSD FRML MDRD: 86 ML/MIN/1.73SQ M
GLUCOSE P FAST SERPL-MCNC: 117 MG/DL (ref 65–99)
HBA1C MFR BLD: 6.3 %
HCT VFR BLD AUTO: 39.9 % (ref 34.8–46.1)
HGB BLD-MCNC: 13.8 G/DL (ref 11.5–15.4)
IMM GRANULOCYTES # BLD AUTO: 0.03 THOUSAND/UL (ref 0–0.2)
IMM GRANULOCYTES NFR BLD AUTO: 0 % (ref 0–2)
LYMPHOCYTES # BLD AUTO: 2.63 THOUSANDS/ÂΜL (ref 0.6–4.47)
LYMPHOCYTES NFR BLD AUTO: 31 % (ref 14–44)
MCH RBC QN AUTO: 31.3 PG (ref 26.8–34.3)
MCHC RBC AUTO-ENTMCNC: 34.6 G/DL (ref 31.4–37.4)
MCV RBC AUTO: 91 FL (ref 82–98)
MONOCYTES # BLD AUTO: 0.61 THOUSAND/ÂΜL (ref 0.17–1.22)
MONOCYTES NFR BLD AUTO: 7 % (ref 4–12)
NEUTROPHILS # BLD AUTO: 5.11 THOUSANDS/ÂΜL (ref 1.85–7.62)
NEUTS SEG NFR BLD AUTO: 61 % (ref 43–75)
NRBC BLD AUTO-RTO: 0 /100 WBCS
PLATELET # BLD AUTO: 339 THOUSANDS/UL (ref 149–390)
PMV BLD AUTO: 10.6 FL (ref 8.9–12.7)
POTASSIUM SERPL-SCNC: 3.9 MMOL/L (ref 3.5–5.3)
RBC # BLD AUTO: 4.41 MILLION/UL (ref 3.81–5.12)
SODIUM SERPL-SCNC: 140 MMOL/L (ref 135–147)
TSH SERPL DL<=0.05 MIU/L-ACNC: 2.56 UIU/ML (ref 0.45–4.5)
WBC # BLD AUTO: 8.52 THOUSAND/UL (ref 4.31–10.16)

## 2024-08-01 PROCEDURE — 84443 ASSAY THYROID STIM HORMONE: CPT

## 2024-08-01 PROCEDURE — 85025 COMPLETE CBC W/AUTO DIFF WBC: CPT

## 2024-08-01 PROCEDURE — 3044F HG A1C LEVEL LT 7.0%: CPT

## 2024-08-01 PROCEDURE — 80048 BASIC METABOLIC PNL TOTAL CA: CPT

## 2024-08-01 PROCEDURE — 86704 HEP B CORE ANTIBODY TOTAL: CPT

## 2024-08-01 PROCEDURE — 36415 COLL VENOUS BLD VENIPUNCTURE: CPT

## 2024-08-01 PROCEDURE — 86705 HEP B CORE ANTIBODY IGM: CPT

## 2024-08-01 PROCEDURE — 3044F HG A1C LEVEL LT 7.0%: CPT | Performed by: PODIATRIST

## 2024-08-01 PROCEDURE — 83036 HEMOGLOBIN GLYCOSYLATED A1C: CPT

## 2024-08-01 PROCEDURE — 86803 HEPATITIS C AB TEST: CPT

## 2024-08-01 PROCEDURE — 87340 HEPATITIS B SURFACE AG IA: CPT

## 2024-08-02 LAB
HBV CORE AB SER QL: NORMAL
HBV CORE IGM SER QL: NORMAL
HBV SURFACE AG SER QL: NORMAL
HCV AB SER QL: NORMAL

## 2024-08-05 ENCOUNTER — OFFICE VISIT (OUTPATIENT)
Dept: FAMILY MEDICINE CLINIC | Facility: CLINIC | Age: 49
End: 2024-08-05

## 2024-08-05 VITALS
HEART RATE: 87 BPM | HEIGHT: 63 IN | OXYGEN SATURATION: 98 % | WEIGHT: 212.2 LBS | SYSTOLIC BLOOD PRESSURE: 125 MMHG | DIASTOLIC BLOOD PRESSURE: 86 MMHG | TEMPERATURE: 98.1 F | BODY MASS INDEX: 37.6 KG/M2 | RESPIRATION RATE: 20 BRPM

## 2024-08-05 DIAGNOSIS — E03.9 HYPOTHYROIDISM, UNSPECIFIED TYPE: ICD-10-CM

## 2024-08-05 DIAGNOSIS — E11.69 TYPE 2 DIABETES MELLITUS WITH OBESITY  (HCC): ICD-10-CM

## 2024-08-05 DIAGNOSIS — Z00.00 ANNUAL PHYSICAL EXAM: Primary | ICD-10-CM

## 2024-08-05 DIAGNOSIS — E66.9 TYPE 2 DIABETES MELLITUS WITH OBESITY  (HCC): ICD-10-CM

## 2024-08-05 DIAGNOSIS — E78.5 HYPERLIPIDEMIA, UNSPECIFIED HYPERLIPIDEMIA TYPE: ICD-10-CM

## 2024-08-05 DIAGNOSIS — G43.919 INTRACTABLE MIGRAINE WITHOUT STATUS MIGRAINOSUS, UNSPECIFIED MIGRAINE TYPE: ICD-10-CM

## 2024-08-05 DIAGNOSIS — K21.9 GASTROESOPHAGEAL REFLUX DISEASE WITHOUT ESOPHAGITIS: ICD-10-CM

## 2024-08-05 LAB
CREAT UR-MCNC: 183.4 MG/DL
MICROALBUMIN UR-MCNC: 11.6 MG/L
MICROALBUMIN/CREAT 24H UR: 6 MG/G CREATININE (ref 0–30)

## 2024-08-05 PROCEDURE — 96372 THER/PROPH/DIAG INJ SC/IM: CPT

## 2024-08-05 PROCEDURE — 82043 UR ALBUMIN QUANTITATIVE: CPT

## 2024-08-05 PROCEDURE — 99396 PREV VISIT EST AGE 40-64: CPT

## 2024-08-05 PROCEDURE — 99214 OFFICE O/P EST MOD 30 MIN: CPT

## 2024-08-05 PROCEDURE — 82570 ASSAY OF URINE CREATININE: CPT

## 2024-08-05 RX ORDER — KETOROLAC TROMETHAMINE 30 MG/ML
30 INJECTION, SOLUTION INTRAMUSCULAR; INTRAVENOUS ONCE
Status: COMPLETED | OUTPATIENT
Start: 2024-08-05 | End: 2024-08-05

## 2024-08-05 RX ORDER — SUMATRIPTAN 25 MG/1
25 TABLET, FILM COATED ORAL ONCE AS NEEDED
Qty: 90 TABLET | Refills: 0 | Status: SHIPPED | OUTPATIENT
Start: 2024-08-05

## 2024-08-05 RX ORDER — METOCLOPRAMIDE 5 MG/1
5 TABLET ORAL 4 TIMES DAILY
Qty: 30 TABLET | Refills: 0 | Status: SHIPPED | OUTPATIENT
Start: 2024-08-05

## 2024-08-05 RX ORDER — ATORVASTATIN CALCIUM 80 MG/1
80 TABLET, FILM COATED ORAL DAILY
Qty: 90 TABLET | Refills: 3 | Status: SHIPPED | OUTPATIENT
Start: 2024-08-05

## 2024-08-05 RX ORDER — NAPROXEN 500 MG/1
500 TABLET ORAL 2 TIMES DAILY WITH MEALS
Qty: 28 TABLET | Refills: 0 | Status: SHIPPED | OUTPATIENT
Start: 2024-08-05 | End: 2024-08-19

## 2024-08-05 RX ADMIN — KETOROLAC TROMETHAMINE 30 MG: 30 INJECTION, SOLUTION INTRAMUSCULAR; INTRAVENOUS at 09:08

## 2024-08-05 NOTE — PROGRESS NOTES
ReAdult Annual Physical  Name: Betty Shane      : 1975      MRN: [id number]  Encounter Provider: LUZ Jarrett  Encounter Date: 2024   Encounter department: [facility]    Assessment & Plan   1. Annual physical exam  2. Type 2 diabetes mellitus with obesity  (HCC)  Assessment & Plan:    Lab Results   Component Value Date    HGBA1C 6.3 (H) 2024    Reports home BG are in the early 100s to 200s.   Increase ozempic from 0.5 mg weekly to 1 mg weekly.   Orders:  -     Diabetic foot exam; Future  -     Albumin / creatinine urine ratio  -     semaglutide, 1 mg/dose, (Ozempic) 4 mg/3 mL injection pen; Inject 0.75 mL (1 mg total) under the skin once a week  -     atorvastatin (LIPITOR) 80 mg tablet; Take 1 tablet (80 mg total) by mouth daily  3. Gastroesophageal reflux disease without esophagitis  Assessment & Plan:  Reports suffering from more increased episodes of reflux despite compliance with pepcid BID & protonix daily.  Nonpharmacologic treatments were discussed including: eating smaller meals, elevation of the head of bed at night, avoidance of caffeine, chocolate, nicotine and peppermint, and avoiding tight fitting clothing. Continue pepcid. Increase protonix from daily to BID.   4. Intractable migraine without status migrainosus, unspecified migraine type  Assessment & Plan:  Recommend seeing ophthalmology. Abortive therapy as rx. Continue topamax  Orders:  -     SUMAtriptan (Imitrex) 25 mg tablet; Take 1 tablet (25 mg total) by mouth once as needed for migraine for up to 90 doses  -     metoclopramide (REGLAN) 5 mg tablet; Take 1 tablet (5 mg total) by mouth 4 (four) times a day  -     naproxen (Naprosyn) 500 mg tablet; Take 1 tablet (500 mg total) by mouth 2 (two) times a day with meals for 14 days  -     ketorolac (TORADOL) injection 30 mg  5. Hypothyroidism, unspecified type  Assessment & Plan:  Lab Results   Component Value Date     [id number] 2.558 08/01/2024     Continue  levothyroxine 150 mcg daily.   6. Hyperlipidemia, unspecified hyperlipidemia type  Assessment & Plan:  Lab Results   Component Value Date    CHOLESTEROL 252 (H) 07/25/2023    CHOLESTEROL 200 09/28/2022    CHOLESTEROL 244 (H) 06/27/2022     Lab Results   Component Value Date    HDL 47 (L) 07/25/2023    HDL 55 09/28/2022    HDL 47 (L) 06/27/2022     Lab Results   Component Value Date    TRIG 398 (H) 07/25/2023    TRIG 240 (H) 09/28/2022    TRIG 249 (H) 06/27/2022     Lab Results   Component Value Date    NONHDLC 145 09/28/2022    NONHDLC 197 06/27/2022    NONHDLC 191 03/23/2022     Continue atorvastatin 80 mg daily  Orders:  -     atorvastatin (LIPITOR) 80 mg tablet; Take 1 tablet (80 mg total) by mouth daily  -     Lipid Panel with Direct LDL reflex; Future    Immunizations and preventive care screenings were discussed with patient today. Appropriate education was printed on patient's after visit summary.    Counseling:  Dental Health: discussed importance of regular tooth brushing, flossing, and dental visits.  Exercise: the importance of regular exercise/physical activity was discussed. Recommend exercise 3-5 times per week for at least 30 minutes.          History of Present Illness     Adult Annual Physical:  Patient presents for annual physical. Patient presents today for follow-up of chronic conditions.   She has had a migraine episode for the past 3 days. It has been several months since she was getting these migraines. Topamax has kept them well controlled. She takes excederin prn which helps. She has been taking it every 6 hours prn for the past three days. Migraine has been accompanied by nausea.     Diet and Physical Activity:  - Diet/Nutrition: well balanced diet. drinks > 64 oz of water per day  - Exercise: no formal exercise.    General Health:  - Sleep: sleeps well and 4-6 hours of sleep on average. sleep schedule for 4 years.  - Hearing: normal hearing  "bilateral ears.  - Vision: no vision problems and goes for regular eye exams.  - Dental: regular dental visits.    /GYN Health:  - Follows with GYN: no.   - Menopause: postmenopausal.   - History of STDs: no    Review of Systems   Constitutional:  Negative for chills and fever.   HENT:  Negative for ear pain and sore throat.    Eyes:  Negative for pain and visual disturbance.   Respiratory:  Negative for cough and shortness of breath.    Cardiovascular:  Negative for chest pain and palpitations.   Gastrointestinal:  Positive for nausea. Negative for abdominal pain and vomiting.        (+) reflux   Genitourinary:  Negative for dysuria and hematuria.   Musculoskeletal:  Negative for arthralgias and back pain.   Skin:  Negative for color change and rash.   Neurological:  Positive for headaches. Negative for seizures and syncope.   All other systems reviewed and are negative.        Objective     /86 (BP Location: Left arm, Patient Position: Sitting, Cuff Size: Large)   Pulse 87   Temp 98.1 °F (36.7 °C) (Temporal)   Resp 20   Ht 5' 3\" (1.6 m)   Wt 96.3 kg (212 lb 3.2 oz)   SpO2 98%   BMI 37.59 kg/m²     Physical Exam  Vitals and nursing note reviewed.   Constitutional:       General: She is not in acute distress.     Appearance: Normal appearance. She is well-developed. She is obese.   HENT:      Head: Normocephalic and atraumatic.      Right Ear: External ear normal.      Left Ear: External ear normal.      Nose: Nose normal.   Eyes:      Conjunctiva/sclera: Conjunctivae normal.   Cardiovascular:      Rate and Rhythm: Normal rate and regular rhythm.      Pulses: Normal pulses. no weak pulses.           Dorsalis pedis pulses are 2+ on the right side and 2+ on the left side.        Posterior tibial pulses are 2+ on the right side and 2+ on the left side.      Heart sounds: Normal heart sounds. No murmur heard.  Pulmonary:      Effort: Pulmonary effort is normal. No respiratory distress.      Breath sounds: " Normal breath sounds.   Abdominal:      Palpations: Abdomen is soft.      Tenderness: There is no abdominal tenderness.   Musculoskeletal:         General: Normal range of motion.      Cervical back: Normal range of motion and neck supple.   Feet:      Right foot:      Skin integrity: No ulcer, skin breakdown, erythema, warmth, callus or dry skin.      Left foot:      Skin integrity: No ulcer, skin breakdown, erythema, warmth, callus or dry skin.   Skin:     General: Skin is warm and dry.   Neurological:      General: No focal deficit present.      Mental Status: She is alert and oriented to person, place, and time. Mental status is at baseline.   Psychiatric:         Mood and Affect: Mood normal.         Behavior: Behavior normal.         Thought Content: Thought content normal.         Judgment: Judgment normal.       Patient's shoes and socks removed.    Right Foot/Ankle   Right Foot Inspection  Skin Exam: skin normal and skin intact. No dry skin, no warmth, no callus, no erythema, no maceration, no abnormal color, no pre-ulcer, no ulcer and no callus.     Toe Exam: ROM and strength within normal limits.     Sensory   Proprioception: intact  Monofilament testing: intact    Vascular  Capillary refills: < 3 seconds  The right DP pulse is 2+. The right PT pulse is 2+.     Left Foot/Ankle  Left Foot Inspection  Skin Exam: skin normal and skin intact. No dry skin, no warmth, no erythema, no maceration, normal color, no pre-ulcer, no ulcer and no callus.     Toe Exam: ROM and strength within normal limits.     Sensory   Proprioception: intact  Monofilament testing: intact    Vascular  Capillary refills: < 3 seconds  The left DP pulse is 2+. The left PT pulse is 2+.     Assign Risk Category  No deformity present  No loss of protective sensation  No weak pulses  Risk: 0

## 2024-08-05 NOTE — ASSESSMENT & PLAN NOTE
Lab Results   Component Value Date    HGBA1C 6.3 (H) 08/01/2024    Reports home BG are in the early 100s to 200s.   Increase ozempic from 0.5 mg weekly to 1 mg weekly.

## 2024-08-05 NOTE — ASSESSMENT & PLAN NOTE
Reports suffering from more increased episodes of reflux despite compliance with pepcid BID & protonix daily.  Nonpharmacologic treatments were discussed including: eating smaller meals, elevation of the head of bed at night, avoidance of caffeine, chocolate, nicotine and peppermint, and avoiding tight fitting clothing. Continue pepcid. Increase protonix from daily to BID.

## 2024-08-05 NOTE — ASSESSMENT & PLAN NOTE
Lab Results   Component Value Date    CHOLESTEROL 252 (H) 07/25/2023    CHOLESTEROL 200 09/28/2022    CHOLESTEROL 244 (H) 06/27/2022     Lab Results   Component Value Date    HDL 47 (L) 07/25/2023    HDL 55 09/28/2022    HDL 47 (L) 06/27/2022     Lab Results   Component Value Date    TRIG 398 (H) 07/25/2023    TRIG 240 (H) 09/28/2022    TRIG 249 (H) 06/27/2022     Lab Results   Component Value Date    NONHDLC 145 09/28/2022    NONHDLC 197 06/27/2022    NONHDLC 191 03/23/2022     Continue atorvastatin 80 mg daily

## 2024-08-05 NOTE — ASSESSMENT & PLAN NOTE
Lab Results   Component Value Date    [id number] 2.558 08/01/2024     Continue  levothyroxine 150 mcg daily.

## 2024-08-05 NOTE — PATIENT INSTRUCTIONS
"Patient Education     Routine physical for adults   The Basics   Written by the doctors and editors at [facility]   What is a physical? -- A physical is a routine visit, or \"check-up,\" with your doctor. You might also hear it called a \"wellness visit\" or \"preventive visit.\"  During each visit, the doctor will:   Ask about your physical and mental health   Ask about your habits, behaviors, and lifestyle   Do an exam   Give you vaccines if needed   Talk to you about any medicines you take   Give advice about your health   Answer your questions  Getting regular check-ups is an important part of taking care of your health. It can help your doctor find and treat any problems you have. But it's also important for preventing health problems.  A routine physical is different from a \"sick visit.\" A sick visit is when you see a doctor because of a health concern or problem. Since physicals are scheduled ahead of time, you can think about what you want to ask the doctor.  How often should I get a physical? -- It depends on your age and health. In general, for people age 21 years and older:   If you are younger than 50 years, you might be able to get a physical every 3 years.   If you are 50 years or older, your doctor might recommend a physical every year.  If you have an ongoing health condition, like diabetes or high blood pressure, your doctor will probably want to see you more often.  What happens during a physical? -- In general, each visit will include:   Physical exam - The doctor or nurse will check your height, weight, heart rate, and blood pressure. They will also look at your eyes and ears. They will ask about how you are feeling and whether you have any symptoms that bother you.   Medicines - It's a good idea to bring a list of all the medicines you take to each doctor visit. Your doctor will talk to you about your medicines and answer any questions. Tell them if you are having any side effects that bother you. You " "should also tell them if you are having trouble paying for any of your medicines.   Habits and behaviors - This includes:   Your diet   Your exercise habits   Whether you smoke, drink alcohol, or use drugs   Whether you are sexually active   Whether you feel safe at home  Your doctor will talk to you about things you can do to improve your health and lower your risk of health problems. They will also offer help and support. For example, if you want to quit smoking, they can give you advice and might prescribe medicines. If you want to improve your diet or get more physical activity, they can help you with this, too.   Lab tests, if needed - The tests you get will depend on your age and situation. For example, your doctor might want to check your:   Cholesterol   Blood sugar   Iron level   Vaccines - The recommended vaccines will depend on your age, health, and what vaccines you already had. Vaccines are very important because they can prevent certain serious or deadly infections.   Discussion of screening - \"Screening\" means checking for diseases or other health problems before they cause symptoms. Your doctor can recommend screening based on your age, risk, and preferences. This might include tests to check for:   Cancer, such as breast, prostate, cervical, ovarian, colorectal, prostate, lung, or skin cancer   Sexually transmitted infections, such as chlamydia and gonorrhea   Mental health conditions like depression and anxiety  Your doctor will talk to you about the different types of screening tests. They can help you decide which screenings to have. They can also explain what the results might mean.   Answering questions - The physical is a good time to ask the doctor or nurse questions about your health. If needed, they can refer you to other doctors or specialists, too.  Adults older than 65 years often need other care, too. As you get older, your doctor will talk to you about:   How to prevent falling at " home   Hearing or vision tests   Memory testing   How to take your medicines safely   Making sure that you have the help and support you need at home  All topics are updated as new evidence becomes available and our peer review process is complete.  This topic retrieved from [employer] on: May 02, 2024.  Topic 912080 Version 1.0  Release: 32.4.3 - C32.122  © 2024 UpToDate, Inc[employer] and/or its affiliates. All rights reserved.  Consumer Information Use and Disclaimer   Disclaimer: This generalized information is a limited summary of diagnosis, treatment, and/or medication information. It is not meant to be comprehensive and should be used as a tool to help the user understand and/or assess potential diagnostic and treatment options. It does NOT include all information about conditions, treatments, medications, side effects, or risks that may apply to a specific patient. It is not intended to be medical advice or a substitute for the medical advice, diagnosis, or treatment of a health care provider based on the health care provider's examination and assessment of a patient's specific and unique circumstances. Patients must speak with a health care provider for complete information about their health, medical questions, and treatment options, including any risks or benefits regarding use of medications. This information does not endorse any treatments or medications as safe, effective, or approved for treating a specific patient. UpToDate, Inc. and its affiliates disclaim any warranty or liability relating to this information or the use thereof.The use of this information is governed by the Terms of Use, available at https://www.wolterstwidoxuwer.com/en/know/clinical-effectiveness-terms. 2024© UpToDate, Inc. and its affiliates and/or licensors. All rights reserved.  Copyright   © 2024 Up[employer]Date, Inc. and/or its affiliates. All rights reserved.

## 2024-09-09 DIAGNOSIS — G43.919 INTRACTABLE MIGRAINE WITHOUT STATUS MIGRAINOSUS, UNSPECIFIED MIGRAINE TYPE: ICD-10-CM

## 2024-09-09 RX ORDER — TOPIRAMATE 50 MG/1
50 TABLET, FILM COATED ORAL 2 TIMES DAILY
Qty: 180 TABLET | Refills: 2 | Status: SHIPPED | OUTPATIENT
Start: 2024-09-09

## 2024-09-25 DIAGNOSIS — G43.919 INTRACTABLE MIGRAINE WITHOUT STATUS MIGRAINOSUS, UNSPECIFIED MIGRAINE TYPE: ICD-10-CM

## 2024-09-25 DIAGNOSIS — E66.9 TYPE 2 DIABETES MELLITUS WITH OBESITY  (HCC): ICD-10-CM

## 2024-09-25 DIAGNOSIS — E03.9 HYPOTHYROIDISM, UNSPECIFIED TYPE: ICD-10-CM

## 2024-09-25 DIAGNOSIS — E11.69 TYPE 2 DIABETES MELLITUS WITH OBESITY  (HCC): Primary | ICD-10-CM

## 2024-09-25 DIAGNOSIS — E11.69 TYPE 2 DIABETES MELLITUS WITH OBESITY  (HCC): ICD-10-CM

## 2024-09-25 DIAGNOSIS — K21.9 GASTROESOPHAGEAL REFLUX DISEASE WITHOUT ESOPHAGITIS: ICD-10-CM

## 2024-09-25 DIAGNOSIS — E66.9 TYPE 2 DIABETES MELLITUS WITH OBESITY  (HCC): Primary | ICD-10-CM

## 2024-09-25 RX ORDER — PANTOPRAZOLE SODIUM 40 MG/1
40 TABLET, DELAYED RELEASE ORAL
Qty: 90 TABLET | Refills: 1 | Status: SHIPPED | OUTPATIENT
Start: 2024-09-25 | End: 2025-09-20

## 2024-09-25 RX ORDER — NAPROXEN 500 MG/1
500 TABLET ORAL 2 TIMES DAILY WITH MEALS
Qty: 28 TABLET | Refills: 0 | OUTPATIENT
Start: 2024-09-25 | End: 2024-10-09

## 2024-09-25 RX ORDER — LEVOTHYROXINE SODIUM 150 UG/1
150 TABLET ORAL DAILY
Qty: 90 TABLET | Refills: 1 | Status: SHIPPED | OUTPATIENT
Start: 2024-09-25

## 2024-11-12 ENCOUNTER — TELEPHONE (OUTPATIENT)
Dept: FAMILY MEDICINE CLINIC | Facility: CLINIC | Age: 49
End: 2024-11-12

## 2024-11-12 NOTE — TELEPHONE ENCOUNTER
Please disregard, patient will still like to come to our office although she moved and is scheduled to come in on 11/22.

## 2024-11-12 NOTE — TELEPHONE ENCOUNTER
Patient left voice message wanting to cancel 11/14 appt.    Appt has been rescheduled for 11/22. Thanks!

## 2024-11-15 ENCOUNTER — APPOINTMENT (OUTPATIENT)
Dept: LAB | Facility: HOSPITAL | Age: 49
End: 2024-11-15
Payer: COMMERCIAL

## 2024-11-15 DIAGNOSIS — E78.5 HYPERLIPIDEMIA, UNSPECIFIED HYPERLIPIDEMIA TYPE: ICD-10-CM

## 2024-11-15 DIAGNOSIS — E66.9 TYPE 2 DIABETES MELLITUS WITH OBESITY  (HCC): ICD-10-CM

## 2024-11-15 DIAGNOSIS — E11.69 TYPE 2 DIABETES MELLITUS WITH OBESITY  (HCC): ICD-10-CM

## 2024-11-15 DIAGNOSIS — E78.2 MIXED HYPERLIPIDEMIA: ICD-10-CM

## 2024-11-15 LAB
CHOLEST SERPL-MCNC: 274 MG/DL (ref ?–200)
CREAT UR-MCNC: 235.2 MG/DL
EST. AVERAGE GLUCOSE BLD GHB EST-MCNC: 154 MG/DL
HBA1C MFR BLD: 7 %
HDLC SERPL-MCNC: 52 MG/DL
LDLC SERPL CALC-MCNC: 177 MG/DL (ref 0–100)
MICROALBUMIN UR-MCNC: 15.6 MG/L
MICROALBUMIN/CREAT 24H UR: 7 MG/G CREATININE (ref 0–30)
TRIGL SERPL-MCNC: 223 MG/DL (ref ?–150)

## 2024-11-15 PROCEDURE — 82043 UR ALBUMIN QUANTITATIVE: CPT

## 2024-11-15 PROCEDURE — 36415 COLL VENOUS BLD VENIPUNCTURE: CPT

## 2024-11-15 PROCEDURE — 83036 HEMOGLOBIN GLYCOSYLATED A1C: CPT

## 2024-11-15 PROCEDURE — 82570 ASSAY OF URINE CREATININE: CPT

## 2024-11-15 PROCEDURE — 80061 LIPID PANEL: CPT

## 2024-11-18 ENCOUNTER — RESULTS FOLLOW-UP (OUTPATIENT)
Dept: FAMILY MEDICINE CLINIC | Facility: CLINIC | Age: 49
End: 2024-11-18

## 2024-11-22 ENCOUNTER — OFFICE VISIT (OUTPATIENT)
Dept: FAMILY MEDICINE CLINIC | Facility: CLINIC | Age: 49
End: 2024-11-22

## 2024-11-22 VITALS
WEIGHT: 212 LBS | DIASTOLIC BLOOD PRESSURE: 80 MMHG | HEART RATE: 96 BPM | OXYGEN SATURATION: 97 % | HEIGHT: 63 IN | BODY MASS INDEX: 37.56 KG/M2 | RESPIRATION RATE: 16 BRPM | SYSTOLIC BLOOD PRESSURE: 120 MMHG | TEMPERATURE: 98 F

## 2024-11-22 DIAGNOSIS — E03.9 HYPOTHYROIDISM, UNSPECIFIED TYPE: ICD-10-CM

## 2024-11-22 DIAGNOSIS — Z23 ENCOUNTER FOR IMMUNIZATION: ICD-10-CM

## 2024-11-22 DIAGNOSIS — E66.9 TYPE 2 DIABETES MELLITUS WITH OBESITY  (HCC): Primary | ICD-10-CM

## 2024-11-22 DIAGNOSIS — E78.2 MIXED HYPERLIPIDEMIA: ICD-10-CM

## 2024-11-22 DIAGNOSIS — E11.69 TYPE 2 DIABETES MELLITUS WITH OBESITY  (HCC): Primary | ICD-10-CM

## 2024-11-22 PROCEDURE — 99214 OFFICE O/P EST MOD 30 MIN: CPT

## 2024-11-22 PROCEDURE — 90471 IMMUNIZATION ADMIN: CPT

## 2024-11-22 PROCEDURE — 90656 IIV3 VACC NO PRSV 0.5 ML IM: CPT

## 2024-11-22 RX ORDER — OMEGA-3-ACID ETHYL ESTERS 1 G/1
2 CAPSULE, LIQUID FILLED ORAL 2 TIMES DAILY
Qty: 360 CAPSULE | Refills: 3 | Status: SHIPPED | OUTPATIENT
Start: 2024-11-22

## 2024-11-22 NOTE — ASSESSMENT & PLAN NOTE
Lab Results   Component Value Date    HGBA1C 7.0 (H) 11/15/2024    - Increase Ozempic from 1 mg weekly to Ozempic 2 mg weekly    Orders:    semaglutide, 2 mg/dose, (Ozempic) 8 mg/ mL injection pen; Inject 0.75 mL (2 mg total) under the skin every 7 days    Ambulatory Referral to Ophthalmology; Future    Hemoglobin A1C; Future

## 2024-11-22 NOTE — ASSESSMENT & PLAN NOTE
Lab Results   Component Value Date    CHOLESTEROL 274 (H) 11/15/2024    CHOLESTEROL 252 (H) 07/25/2023    CHOLESTEROL 200 09/28/2022     Lab Results   Component Value Date    HDL 52 11/15/2024    HDL 47 (L) 07/25/2023    HDL 55 09/28/2022     Lab Results   Component Value Date    TRIG 223 (H) 11/15/2024    TRIG 398 (H) 07/25/2023    TRIG 240 (H) 09/28/2022     Lab Results   Component Value Date    NONHDLC 145 09/28/2022    NONHDLC 197 06/27/2022    NONHDLC 191 03/23/2022     Lab Results   Component Value Date    LDLCALC 177 (H) 11/15/2024     Not at goal. Pt reports compliance with atorvastatin 80 mg daily. Start omega-3    Orders:    omega-3-acid ethyl esters (LOVAZA) 1 g capsule; Take 2 capsules (2 g total) by mouth 2 (two) times a day    Lipid Panel with Direct LDL reflex; Future

## 2024-11-22 NOTE — PROGRESS NOTES
Name: Betty hSane      : 1975      MRN: [id number]  Encounter Provider: LUZ Jarrett  Encounter Date: 2024   Encounter department: [facility]  :  Assessment & Plan  Type 2 diabetes mellitus with obesity  (HCC)    Lab Results   Component Value Date    HGBA1C 7.0 (H) 11/15/2024    - Increase Ozempic from 1 mg weekly to Ozempic 2 mg weekly    Orders:    semaglutide, 2 mg/dose, (Ozempic) 8 mg/ mL injection pen; Inject 0.75 mL (2 mg total) under the skin every 7 days    Ambulatory Referral to Ophthalmology; Future    Hemoglobin A1C; Future    Mixed hyperlipidemia  Lab Results   Component Value Date    CHOLESTEROL 274 (H) 11/15/2024    CHOLESTEROL 252 (H) 2023    CHOLESTEROL 200 2022     Lab Results   Component Value Date    HDL 52 11/15/2024    HDL 47 (L) 2023    HDL 55 2022     Lab Results   Component Value Date    TRIG 223 (H) 11/15/2024    TRIG 398 (H) 2023    TRIG 240 (H) 2022     Lab Results   Component Value Date    NONHDLC 145 2022    NONHDLC 197 2022    NONHDLC 191 2022     Lab Results   Component Value Date    LDLCALC 177 (H) 11/15/2024     Not at goal. Pt reports compliance with atorvastatin 80 mg daily. Start omega-3    Orders:    omega-3-acid ethyl esters (LOVAZA) 1 g capsule; Take 2 capsules (2 g total) by mouth 2 (two) times a day    Lipid Panel with Direct LDL reflex; Future    Hypothyroidism, unspecified type  Lab Results   Component Value Date    [id number] 1.778 11/15/2024     Continue  levothyroxine 150 mcg daily.          Encounter for immunization    Orders:    influenza vaccine preservative-free 0.5 mL IM (Fluzone, Afluria, Fluarix, Flulaval)           History of Present Illness     Betty Shane is a 49 y.o. female  has a past medical history of Ankle swelling, Calf pain, Constipation, Diabetes mellitus (HCC), Disease of thyroid gland, Double vision, GERD  "(gastroesophageal reflux disease), H/O: hysterectomy, Headache, History of hysterectomy, History of hysterectomy, Hypertension, Joint pain, Numbness, Palpitations, Polycystic ovary syndrome, and SOB (shortness of breath).  has a past surgical history that includes Hysterectomy.    Pt presents today for follow-up of HLD/DM/hypothyroidism.      Review of Systems   Constitutional:  Negative for chills and fever.   HENT:  Negative for ear pain and sore throat.    Eyes:  Negative for pain and visual disturbance.   Respiratory:  Negative for cough and shortness of breath.    Cardiovascular:  Negative for chest pain and palpitations.   Gastrointestinal:  Negative for abdominal pain and vomiting.   Genitourinary:  Negative for dysuria and hematuria.   Musculoskeletal:  Negative for arthralgias and back pain.   Skin:  Negative for color change and rash.   Neurological:  Negative for seizures and syncope.   All other systems reviewed and are negative.         Objective   /80 (BP Location: Right arm, Patient Position: Sitting, Cuff Size: Standard)   Pulse 96   Temp 98 °F (36.7 °C) (Temporal)   Resp 16   Ht 5' 3\" (1.6 m)   Wt 96.2 kg (212 lb)   SpO2 97%   BMI 37.55 kg/m²      Physical Exam  Vitals and nursing note reviewed.   Constitutional:       General: She is not in acute distress.     Appearance: Normal appearance. She is well-developed.   HENT:      Head: Normocephalic and atraumatic.      Right Ear: External ear normal.      Left Ear: External ear normal.      Nose: Nose normal.   Eyes:      Conjunctiva/sclera: Conjunctivae normal.   Cardiovascular:      Rate and Rhythm: Normal rate and regular rhythm.      Pulses: Normal pulses.      Heart sounds: Normal heart sounds. No murmur heard.  Pulmonary:      Effort: Pulmonary effort is normal. No respiratory distress.      Breath sounds: Normal breath sounds.   Abdominal:      Palpations: Abdomen is soft.      Tenderness: There is no abdominal tenderness. "   Musculoskeletal:         General: No swelling. Normal range of motion.      Cervical back: Normal range of motion and neck supple.   Skin:     General: Skin is warm and dry.      Capillary Refill: Capillary refill takes less than 2 seconds.   Neurological:      Mental Status: She is alert and oriented to person, place, and time. Mental status is at baseline.   Psychiatric:         Mood and Affect: Mood normal.         Behavior: Behavior normal.         Thought Content: Thought content normal.         Judgment: Judgment normal.

## 2024-11-22 NOTE — ASSESSMENT & PLAN NOTE
Lab Results   Component Value Date    [id number] 1.778 11/15/2024     Continue  levothyroxine 150 mcg daily.

## 2024-12-06 ENCOUNTER — OFFICE VISIT (OUTPATIENT)
Age: 49
End: 2024-12-06

## 2024-12-06 DIAGNOSIS — H43.823 VITREOMACULAR ADHESION OF BOTH EYES: Primary | ICD-10-CM

## 2024-12-06 DIAGNOSIS — H25.13 NUCLEAR SCLEROSIS OF BOTH EYES: ICD-10-CM

## 2024-12-06 DIAGNOSIS — D31.32 CHOROIDAL NEVUS, LEFT EYE: ICD-10-CM

## 2024-12-06 DIAGNOSIS — E11.69 TYPE 2 DIABETES MELLITUS WITH OBESITY  (HCC): ICD-10-CM

## 2024-12-06 DIAGNOSIS — E66.9 TYPE 2 DIABETES MELLITUS WITH OBESITY  (HCC): ICD-10-CM

## 2024-12-06 NOTE — ASSESSMENT & PLAN NOTE
Lab Results   Component Value Date    HGBA1C 7.0 (H) 11/15/2024     No diabetic retinopathy on exam, no macular edema, no NV. The importance of proper blood sugar, blood lipids, and blood pressure control for minimizing the risk of vision loss due to retinopathy associated with diabetes mellitus and hypertension was discussed with the patient. Stressed the importance of following up for monitoring and management by a primary care physician.

## 2024-12-06 NOTE — PROGRESS NOTES
Name: Betty Shane      : 1975      MRN: [id number]  Encounter Provider: Chacha Sánchez MD  Encounter Date: 2024   Encounter department: [facility] OPHTHALMOLOGY  :  Assessment & Plan  Vitreomacular adhesion of both eyes    Pt has syneresis; No retinal tears, breaks, or detachments on dilated examination; Recommend monitoring; Retinal detachment precautions were discussed with the patient. The patient was instructed to call or return immediately for an increase in flashes of light, floaters (dark spots in vision), or curtaining of the visual field.     Nuclear sclerosis of both eyes  Not visually significant; Continue to monitor;        Choroidal nevus, left eye  Recommend no intervention; Pt has a small nevus that appears benign.  Discussed < 1% chance of transformation to choroidal melanoma.        Type 2 diabetes mellitus with obesity  (HCC)    Lab Results   Component Value Date    HGBA1C 7.0 (H) 11/15/2024     No diabetic retinopathy on exam, no macular edema, no NV. The importance of proper blood sugar, blood lipids, and blood pressure control for minimizing the risk of vision loss due to retinopathy associated with diabetes mellitus and hypertension was discussed with the patient. Stressed the importance of following up for monitoring and management by a primary care physician.            Betty Shane is a 49 y.o. female who presents Np Diabetic Exam, A1C 7,  today, Vision is blurry for near, far is good. C/o flashes of light sometimes, H/o headaches, denies floaters x 2 mos. No previous eye exams  History obtained from: patient    Review of Systems  Medical History Reviewed by provider this encounter:     .     Past Ocular History:none  Ocular Meds/Drops: none    Base Eye Exam       Visual Acuity (Snellen - Linear)         Right Left    Dist sc 20/20 20/20              Tonometry (Applanation, 1:55 PM)         Right Left    Pressure 16 16              Pupils          Pupils Dark    Right PERRL 4    Left PERRL 4              Visual Fields         Left Right     Full Full              Extraocular Movement         Right Left     Full, Ortho Full, Ortho              Neuro/Psych       Oriented x3: Yes    Mood/Affect: Normal              Dilation       Both eyes: 2.5% Phenylephrine @ 1:55 PM              Dilation #2       Both eyes: 1.0% Mydriacyl @ 1:57 PM                  Slit Lamp and Fundus Exam       External Exam         Right Left    External Normal Normal              Slit Lamp Exam         Right Left    Lids/Lashes wnl wnl    Conjunctiva/Sclera White and quiet White and quiet    Cornea Clear Clear    Anterior Chamber Deep and quiet Deep and quiet    Iris Round and reactive Round and reactive    Lens Trace Nuclear sclerosis Trace Nuclear sclerosis    Anterior Vitreous no PVD, clear, no cell no PVD, clear, no cell              Fundus Exam         Right Left    Disc sharp, no pallor sharp, no pallor    C/D Ratio 0.2 0.2    Macula Good foveal reflex Good foveal reflex    Vessels normal in caliber normal in caliber    Periphery Flat, no retinal tear or detachment, no masses Flat, no retinal tear or detachment, no masses; < 0.5DD nevus inferiorly flat, no OP                      IMAGING:  OCT, Retina - OU - Both Eyes          Right Eye  Quality was good. Findings include (Vitreomacular adhesion).     Left Eye  Quality was good. Findings include (Vitreomacular adhesion).

## 2025-01-30 ENCOUNTER — RESULTS FOLLOW-UP (OUTPATIENT)
Dept: FAMILY MEDICINE CLINIC | Facility: CLINIC | Age: 50
End: 2025-01-30

## 2025-01-30 ENCOUNTER — APPOINTMENT (OUTPATIENT)
Dept: LAB | Facility: HOSPITAL | Age: 50
End: 2025-01-30
Payer: COMMERCIAL

## 2025-01-30 DIAGNOSIS — E66.9 TYPE 2 DIABETES MELLITUS WITH OBESITY  (HCC): ICD-10-CM

## 2025-01-30 DIAGNOSIS — E78.2 MIXED HYPERLIPIDEMIA: ICD-10-CM

## 2025-01-30 DIAGNOSIS — E11.69 TYPE 2 DIABETES MELLITUS WITH OBESITY  (HCC): ICD-10-CM

## 2025-01-30 DIAGNOSIS — E11.69 TYPE 2 DIABETES MELLITUS WITH OTHER SPECIFIED COMPLICATION, WITHOUT LONG-TERM CURRENT USE OF INSULIN (HCC): ICD-10-CM

## 2025-01-30 LAB
CHOLEST SERPL-MCNC: 255 MG/DL (ref ?–200)
EST. AVERAGE GLUCOSE BLD GHB EST-MCNC: 114 MG/DL
HBA1C MFR BLD: 5.6 %
HDLC SERPL-MCNC: 49 MG/DL
LDLC SERPL CALC-MCNC: 155 MG/DL (ref 0–100)
TRIGL SERPL-MCNC: 256 MG/DL (ref ?–150)

## 2025-01-30 PROCEDURE — 80061 LIPID PANEL: CPT

## 2025-01-30 PROCEDURE — 36415 COLL VENOUS BLD VENIPUNCTURE: CPT

## 2025-01-30 PROCEDURE — 83036 HEMOGLOBIN GLYCOSYLATED A1C: CPT

## 2025-02-04 ENCOUNTER — OFFICE VISIT (OUTPATIENT)
Dept: FAMILY MEDICINE CLINIC | Facility: CLINIC | Age: 50
End: 2025-02-04

## 2025-02-04 VITALS
TEMPERATURE: 97.5 F | WEIGHT: 205.7 LBS | RESPIRATION RATE: 18 BRPM | SYSTOLIC BLOOD PRESSURE: 124 MMHG | DIASTOLIC BLOOD PRESSURE: 70 MMHG | HEART RATE: 79 BPM | OXYGEN SATURATION: 98 % | HEIGHT: 63 IN | BODY MASS INDEX: 36.45 KG/M2

## 2025-02-04 DIAGNOSIS — E03.9 HYPOTHYROIDISM, UNSPECIFIED TYPE: ICD-10-CM

## 2025-02-04 DIAGNOSIS — E66.9 TYPE 2 DIABETES MELLITUS WITH OBESITY  (HCC): Primary | ICD-10-CM

## 2025-02-04 DIAGNOSIS — L65.9 HAIR LOSS: ICD-10-CM

## 2025-02-04 DIAGNOSIS — E11.69 TYPE 2 DIABETES MELLITUS WITH OBESITY  (HCC): Primary | ICD-10-CM

## 2025-02-04 DIAGNOSIS — G43.919 INTRACTABLE MIGRAINE WITHOUT STATUS MIGRAINOSUS, UNSPECIFIED MIGRAINE TYPE: ICD-10-CM

## 2025-02-04 DIAGNOSIS — E78.2 MIXED HYPERLIPIDEMIA: ICD-10-CM

## 2025-02-04 PROCEDURE — 99214 OFFICE O/P EST MOD 30 MIN: CPT

## 2025-02-04 RX ORDER — FENOFIBRATE 145 MG/1
145 TABLET, COATED ORAL DAILY
Qty: 90 TABLET | Refills: 3 | Status: CANCELLED | OUTPATIENT
Start: 2025-02-04

## 2025-02-04 RX ORDER — ROSUVASTATIN CALCIUM 40 MG/1
40 TABLET, COATED ORAL DAILY
Qty: 100 TABLET | Refills: 3 | Status: SHIPPED | OUTPATIENT
Start: 2025-02-04

## 2025-02-04 RX ORDER — EZETIMIBE 10 MG/1
10 TABLET ORAL DAILY
Qty: 90 TABLET | Refills: 3 | Status: SHIPPED | OUTPATIENT
Start: 2025-02-04 | End: 2026-01-30

## 2025-02-04 RX ORDER — SUMATRIPTAN 50 MG/1
50 TABLET, FILM COATED ORAL ONCE AS NEEDED
Qty: 10 TABLET | Refills: 5 | Status: SHIPPED | OUTPATIENT
Start: 2025-02-04

## 2025-02-04 RX ORDER — BACLOFEN 10 MG/1
10 TABLET ORAL 3 TIMES DAILY PRN
Qty: 90 TABLET | Refills: 0 | Status: SHIPPED | OUTPATIENT
Start: 2025-02-04

## 2025-02-04 RX ORDER — PROPRANOLOL HYDROCHLORIDE 80 MG/1
80 CAPSULE, EXTENDED RELEASE ORAL DAILY
Qty: 90 CAPSULE | Refills: 0 | Status: SHIPPED | OUTPATIENT
Start: 2025-02-04

## 2025-02-04 NOTE — ASSESSMENT & PLAN NOTE
Lab Results   Component Value Date    CHOLESTEROL 255 (H) 01/30/2025    CHOLESTEROL 274 (H) 11/15/2024    CHOLESTEROL 252 (H) 07/25/2023     Lab Results   Component Value Date    HDL 49 (L) 01/30/2025    HDL 52 11/15/2024    HDL 47 (L) 07/25/2023     Lab Results   Component Value Date    TRIG 256 (H) 01/30/2025    TRIG 223 (H) 11/15/2024    TRIG 398 (H) 07/25/2023     Lab Results   Component Value Date    NONHDLC 145 09/28/2022    NONHDLC 197 06/27/2022    NONHDLC 191 03/23/2022     Lab Results   Component Value Date    LDLCALC 155 (H) 01/30/2025   Significant Fhx HLD.  Reports compliance with Lipitor 80 mg daily & omega-3 2 mg BID. Continue omega-3. D/c Lipitor, start Crestor 40 mg daily & zetia 10 mg daily. Consider cardiology referral if no improvement.   Orders:    rosuvastatin (CRESTOR) 40 MG tablet; Take 1 tablet (40 mg total) by mouth daily    ezetimibe (ZETIA) 10 mg tablet; Take 1 tablet (10 mg total) by mouth daily    Lipid Panel with Direct LDL reflex; Future

## 2025-02-04 NOTE — ASSESSMENT & PLAN NOTE
Lab Results   Component Value Date    [id number] 1.778 11/15/2024   Continue levothyroxine 150 mcg daily.

## 2025-02-04 NOTE — ASSESSMENT & PLAN NOTE
Medications as prescribed    Orders:    propranolol (INDERAL LA) 80 mg 24 hr capsule; Take 1 capsule (80 mg total) by mouth daily    SUMAtriptan (IMITREX) 50 mg tablet; Take 1 tablet (50 mg total) by mouth once as needed for migraine for up to 1 dose may repeat in 2 hours if necessary    baclofen 10 mg tablet; Take 1 tablet (10 mg total) by mouth 3 (three) times a day as needed for muscle spasms

## 2025-02-04 NOTE — ASSESSMENT & PLAN NOTE
Lab Results   Component Value Date    HGBA1C 5.6 01/30/2025     - At goal. Continue Ozempic 2 mg weekly

## 2025-02-04 NOTE — PROGRESS NOTES
Name: Betty Shane      : 1975      MRN: [id number]  Encounter Provider: LUZ Jarrett  Encounter Date: 2025   Encounter department: [facility]  :  Assessment & Plan  Type 2 diabetes mellitus with obesity  (HCC)    Lab Results   Component Value Date    HGBA1C 5.6 2025     - At goal. Continue Ozempic 2 mg weekly       Hypothyroidism, unspecified type  Lab Results   Component Value Date    [id number] 1.778 11/15/2024   Continue levothyroxine 150 mcg daily.          Mixed hyperlipidemia  Lab Results   Component Value Date    CHOLESTEROL 255 (H) 2025    CHOLESTEROL 274 (H) 11/15/2024    CHOLESTEROL 252 (H) 2023     Lab Results   Component Value Date    HDL 49 (L) 2025    HDL 52 11/15/2024    HDL 47 (L) 2023     Lab Results   Component Value Date    TRIG 256 (H) 2025    TRIG 223 (H) 11/15/2024    TRIG 398 (H) 2023     Lab Results   Component Value Date    NONHDLC 145 2022    NONHDLC 197 2022    NONHDLC 191 2022     Lab Results   Component Value Date    LDLCALC 155 (H) 2025   Significant Fhx HLD.  Reports compliance with Lipitor 80 mg daily & omega-3 2 mg BID. Continue omega-3. D/c Lipitor, start Crestor 40 mg daily & zetia 10 mg daily. Consider cardiology referral if no improvement.   Orders:  •  rosuvastatin (CRESTOR) 40 MG tablet; Take 1 tablet (40 mg total) by mouth daily  •  ezetimibe (ZETIA) 10 mg tablet; Take 1 tablet (10 mg total) by mouth daily  •  Lipid Panel with Direct LDL reflex; Future    Intractable migraine without status migrainosus, unspecified migraine type   Medications as prescribed    Orders:  •  propranolol (INDERAL LA) 80 mg 24 hr capsule; Take 1 capsule (80 mg total) by mouth daily  •  SUMAtriptan (IMITREX) 50 mg tablet; Take 1 tablet (50 mg total) by mouth once as needed for migraine for up to 1 dose may repeat in 2 hours if necessary  •  baclofen 10 mg  tablet; Take 1 tablet (10 mg total) by mouth 3 (three) times a day as needed for muscle spasms    Hair loss    Orders:  •  TSH w/Reflex; Future  •  Comprehensive metabolic panel; Future  •  CBC and differential; Future  •  Vitamin D 25 hydroxy; Future  •  TSH, 3rd generation with Free T4 reflex; Future  •  Folate; Future  •  Vitamin B12; Future        BMI Counseling: Body mass index is 36.44 kg/m². The BMI is above normal. Nutrition recommendations include decreasing portion sizes, encouraging healthy choices of fruits and vegetables, decreasing fast food intake, consuming healthier snacks, limiting drinks that contain sugar, moderation in carbohydrate intake, increasing intake of lean protein, reducing intake of saturated and trans fat and reducing intake of cholesterol. Exercise recommendations include moderate physical activity 150 minutes/week. No pharmacotherapy was ordered. Rationale for BMI follow-up plan is due to patient being overweight or obese.   History of Present Illness   Betty Shane is a 49 y.o. female  has a past medical history of Ankle swelling, Calf pain, Constipation, Diabetes mellitus (HCC), Disease of thyroid gland, Double vision, GERD (gastroesophageal reflux disease), H/O: hysterectomy, Headache, History of hysterectomy, History of hysterectomy, Hypertension, Joint pain, Numbness, Palpitations, Polycystic ovary syndrome, and SOB (shortness of breath).  has a past surgical history that includes Hysterectomy.    Patient presents today for follow-up of chronic conditions.   She reports significant hair loss present for 2-3 weeks. No menstruation for over 10 years s/p hysterectomy.  She reports a migraine for the past week. She is not on maintenance medication. Previously on Topamax but that was ineffective. She has been taking Topamax 25 mg prn & excederin prn which has been minimally effective.   Review of Systems   Constitutional:  Negative for chills and fever.   HENT:  Negative  "for ear pain and sore throat.    Eyes:  Negative for pain and visual disturbance.   Respiratory:  Negative for cough and shortness of breath.    Cardiovascular:  Negative for chest pain and palpitations.   Gastrointestinal:  Negative for abdominal pain and vomiting.   Genitourinary:  Negative for dysuria and hematuria.   Musculoskeletal:  Negative for arthralgias and back pain.   Skin:  Negative for color change and rash.   Neurological:  Negative for seizures and syncope.   All other systems reviewed and are negative.      Objective   /70 (BP Location: Left arm, Patient Position: Sitting, Cuff Size: Standard)   Pulse 79   Temp 97.5 °F (36.4 °C) (Temporal)   Resp 18   Ht 5' 3\" (1.6 m)   Wt 93.3 kg (205 lb 11.2 oz)   SpO2 98%   BMI 36.44 kg/m²      Physical Exam  Vitals and nursing note reviewed.   Constitutional:       General: She is not in acute distress.     Appearance: Normal appearance. She is well-developed. She is obese.   HENT:      Head: Normocephalic and atraumatic.      Right Ear: External ear normal.      Left Ear: External ear normal.      Nose: Nose normal.   Eyes:      Conjunctiva/sclera: Conjunctivae normal.   Cardiovascular:      Rate and Rhythm: Normal rate and regular rhythm.      Pulses: Normal pulses.      Heart sounds: Normal heart sounds. No murmur heard.  Pulmonary:      Effort: Pulmonary effort is normal. No respiratory distress.      Breath sounds: Normal breath sounds.   Abdominal:      General: Bowel sounds are normal.      Palpations: Abdomen is soft.      Tenderness: There is no abdominal tenderness.   Musculoskeletal:         General: No swelling. Normal range of motion.      Cervical back: Normal range of motion and neck supple.   Skin:     General: Skin is warm and dry.      Capillary Refill: Capillary refill takes less than 2 seconds.   Neurological:      General: No focal deficit present.      Mental Status: She is alert and oriented to person, place, and time. Mental " status is at baseline.   Psychiatric:         Mood and Affect: Mood normal.         Behavior: Behavior normal.         Thought Content: Thought content normal.         Judgment: Judgment normal.

## 2025-02-06 DIAGNOSIS — E66.9 TYPE 2 DIABETES MELLITUS WITH OBESITY  (HCC): ICD-10-CM

## 2025-02-06 DIAGNOSIS — E11.69 TYPE 2 DIABETES MELLITUS WITH OBESITY  (HCC): ICD-10-CM

## 2025-02-11 ENCOUNTER — APPOINTMENT (OUTPATIENT)
Dept: LAB | Facility: HOSPITAL | Age: 50
End: 2025-02-11
Payer: COMMERCIAL

## 2025-02-11 DIAGNOSIS — E78.2 MIXED HYPERLIPIDEMIA: ICD-10-CM

## 2025-02-11 DIAGNOSIS — L65.9 HAIR LOSS: ICD-10-CM

## 2025-02-11 LAB
25(OH)D3 SERPL-MCNC: 22.3 NG/ML (ref 30–100)
ALBUMIN SERPL BCG-MCNC: 4.2 G/DL (ref 3.5–5)
ALP SERPL-CCNC: 65 U/L (ref 34–104)
ALT SERPL W P-5'-P-CCNC: 39 U/L (ref 7–52)
ANION GAP SERPL CALCULATED.3IONS-SCNC: 7 MMOL/L (ref 4–13)
AST SERPL W P-5'-P-CCNC: 27 U/L (ref 13–39)
BASOPHILS # BLD AUTO: 0.01 THOUSANDS/ΜL (ref 0–0.1)
BASOPHILS NFR BLD AUTO: 0 % (ref 0–1)
BILIRUB SERPL-MCNC: 0.21 MG/DL (ref 0.2–1)
BUN SERPL-MCNC: 22 MG/DL (ref 5–25)
CALCIUM SERPL-MCNC: 9.4 MG/DL (ref 8.4–10.2)
CHLORIDE SERPL-SCNC: 108 MMOL/L (ref 96–108)
CHOLEST SERPL-MCNC: 134 MG/DL (ref ?–200)
CO2 SERPL-SCNC: 27 MMOL/L (ref 21–32)
CREAT SERPL-MCNC: 0.65 MG/DL (ref 0.6–1.3)
EOSINOPHIL # BLD AUTO: 0.1 THOUSAND/ΜL (ref 0–0.61)
EOSINOPHIL NFR BLD AUTO: 1 % (ref 0–6)
ERYTHROCYTE [DISTWIDTH] IN BLOOD BY AUTOMATED COUNT: 13 % (ref 11.6–15.1)
FOLATE SERPL-MCNC: 9.5 NG/ML
GFR SERPL CREATININE-BSD FRML MDRD: 105 ML/MIN/1.73SQ M
GLUCOSE P FAST SERPL-MCNC: 108 MG/DL (ref 65–99)
HCT VFR BLD AUTO: 38.1 % (ref 34.8–46.1)
HDLC SERPL-MCNC: 41 MG/DL
HGB BLD-MCNC: 12.9 G/DL (ref 11.5–15.4)
IMM GRANULOCYTES # BLD AUTO: 0.03 THOUSAND/UL (ref 0–0.2)
IMM GRANULOCYTES NFR BLD AUTO: 0 % (ref 0–2)
LDLC SERPL CALC-MCNC: 51 MG/DL (ref 0–100)
LYMPHOCYTES # BLD AUTO: 2.76 THOUSANDS/ΜL (ref 0.6–4.47)
LYMPHOCYTES NFR BLD AUTO: 37 % (ref 14–44)
MCH RBC QN AUTO: 29.6 PG (ref 26.8–34.3)
MCHC RBC AUTO-ENTMCNC: 33.9 G/DL (ref 31.4–37.4)
MCV RBC AUTO: 87 FL (ref 82–98)
MONOCYTES # BLD AUTO: 0.5 THOUSAND/ΜL (ref 0.17–1.22)
MONOCYTES NFR BLD AUTO: 7 % (ref 4–12)
NEUTROPHILS # BLD AUTO: 3.98 THOUSANDS/ΜL (ref 1.85–7.62)
NEUTS SEG NFR BLD AUTO: 55 % (ref 43–75)
NRBC BLD AUTO-RTO: 0 /100 WBCS
PLATELET # BLD AUTO: 298 THOUSANDS/UL (ref 149–390)
PMV BLD AUTO: 10.8 FL (ref 8.9–12.7)
POTASSIUM SERPL-SCNC: 3.9 MMOL/L (ref 3.5–5.3)
PROT SERPL-MCNC: 6.9 G/DL (ref 6.4–8.4)
RBC # BLD AUTO: 4.36 MILLION/UL (ref 3.81–5.12)
SODIUM SERPL-SCNC: 142 MMOL/L (ref 135–147)
TRIGL SERPL-MCNC: 210 MG/DL (ref ?–150)
TSH SERPL DL<=0.05 MIU/L-ACNC: 2.48 UIU/ML (ref 0.45–4.5)
VIT B12 SERPL-MCNC: 1198 PG/ML (ref 180–914)
WBC # BLD AUTO: 7.38 THOUSAND/UL (ref 4.31–10.16)

## 2025-02-11 PROCEDURE — 82746 ASSAY OF FOLIC ACID SERUM: CPT

## 2025-02-11 PROCEDURE — 82306 VITAMIN D 25 HYDROXY: CPT

## 2025-02-11 PROCEDURE — 36415 COLL VENOUS BLD VENIPUNCTURE: CPT

## 2025-02-11 PROCEDURE — 82607 VITAMIN B-12: CPT

## 2025-02-11 PROCEDURE — 85025 COMPLETE CBC W/AUTO DIFF WBC: CPT

## 2025-02-11 PROCEDURE — 80061 LIPID PANEL: CPT

## 2025-02-11 PROCEDURE — 80053 COMPREHEN METABOLIC PANEL: CPT

## 2025-02-11 PROCEDURE — 84443 ASSAY THYROID STIM HORMONE: CPT

## 2025-02-12 ENCOUNTER — RESULTS FOLLOW-UP (OUTPATIENT)
Dept: FAMILY MEDICINE CLINIC | Facility: CLINIC | Age: 50
End: 2025-02-12

## 2025-04-04 ENCOUNTER — APPOINTMENT (EMERGENCY)
Dept: CT IMAGING | Facility: HOSPITAL | Age: 50
End: 2025-04-04
Payer: COMMERCIAL

## 2025-04-04 ENCOUNTER — HOSPITAL ENCOUNTER (OUTPATIENT)
Facility: HOSPITAL | Age: 50
Discharge: HOME | End: 2025-04-05
Attending: EMERGENCY MEDICINE | Admitting: SURGERY
Payer: COMMERCIAL

## 2025-04-04 DIAGNOSIS — K81.0 ACUTE CHOLECYSTITIS: Primary | ICD-10-CM

## 2025-04-04 DIAGNOSIS — R10.13 EPIGASTRIC PAIN: ICD-10-CM

## 2025-04-04 LAB
ALBUMIN SERPL BCG-MCNC: 4.3 G/DL (ref 3.5–5)
ALP SERPL-CCNC: 58 U/L (ref 34–104)
ALT SERPL W P-5'-P-CCNC: 31 U/L (ref 7–52)
ANION GAP SERPL CALCULATED.3IONS-SCNC: 6 MMOL/L (ref 4–13)
AST SERPL W P-5'-P-CCNC: 21 U/L (ref 13–39)
BASOPHILS # BLD AUTO: 0.02 THOUSANDS/ÂΜL (ref 0–0.1)
BASOPHILS NFR BLD AUTO: 0 % (ref 0–1)
BILIRUB SERPL-MCNC: 0.37 MG/DL (ref 0.2–1)
BUN SERPL-MCNC: 11 MG/DL (ref 5–25)
CALCIUM SERPL-MCNC: 9.3 MG/DL (ref 8.4–10.2)
CARDIAC TROPONIN I PNL SERPL HS: <2 NG/L (ref ?–50)
CHLORIDE SERPL-SCNC: 104 MMOL/L (ref 96–108)
CO2 SERPL-SCNC: 29 MMOL/L (ref 21–32)
CREAT SERPL-MCNC: 0.57 MG/DL (ref 0.6–1.3)
EOSINOPHIL # BLD AUTO: 0.12 THOUSAND/ÂΜL (ref 0–0.61)
EOSINOPHIL NFR BLD AUTO: 2 % (ref 0–6)
ERYTHROCYTE [DISTWIDTH] IN BLOOD BY AUTOMATED COUNT: 13.2 % (ref 11.6–15.1)
GFR SERPL CREATININE-BSD FRML MDRD: 109 ML/MIN/1.73SQ M
GLUCOSE SERPL-MCNC: 114 MG/DL (ref 65–140)
HCT VFR BLD AUTO: 37.2 % (ref 34.8–46.1)
HGB BLD-MCNC: 12.9 G/DL (ref 11.5–15.4)
IMM GRANULOCYTES # BLD AUTO: 0.02 THOUSAND/UL (ref 0–0.2)
IMM GRANULOCYTES NFR BLD AUTO: 0 % (ref 0–2)
LIPASE SERPL-CCNC: 39 U/L (ref 11–82)
LYMPHOCYTES # BLD AUTO: 2.23 THOUSANDS/ÂΜL (ref 0.6–4.47)
LYMPHOCYTES NFR BLD AUTO: 32 % (ref 14–44)
MCH RBC QN AUTO: 30.6 PG (ref 26.8–34.3)
MCHC RBC AUTO-ENTMCNC: 34.7 G/DL (ref 31.4–37.4)
MCV RBC AUTO: 88 FL (ref 82–98)
MONOCYTES # BLD AUTO: 0.55 THOUSAND/ÂΜL (ref 0.17–1.22)
MONOCYTES NFR BLD AUTO: 8 % (ref 4–12)
NEUTROPHILS # BLD AUTO: 4 THOUSANDS/ÂΜL (ref 1.85–7.62)
NEUTS SEG NFR BLD AUTO: 58 % (ref 43–75)
NRBC BLD AUTO-RTO: 0 /100 WBCS
PLATELET # BLD AUTO: 238 THOUSANDS/UL (ref 149–390)
PMV BLD AUTO: 11 FL (ref 8.9–12.7)
POTASSIUM SERPL-SCNC: 3.6 MMOL/L (ref 3.5–5.3)
PROT SERPL-MCNC: 7.2 G/DL (ref 6.4–8.4)
RBC # BLD AUTO: 4.22 MILLION/UL (ref 3.81–5.12)
SODIUM SERPL-SCNC: 139 MMOL/L (ref 135–147)
WBC # BLD AUTO: 6.94 THOUSAND/UL (ref 4.31–10.16)

## 2025-04-04 PROCEDURE — 74177 CT ABD & PELVIS W/CONTRAST: CPT

## 2025-04-04 PROCEDURE — 93005 ELECTROCARDIOGRAM TRACING: CPT

## 2025-04-04 PROCEDURE — 99284 EMERGENCY DEPT VISIT MOD MDM: CPT

## 2025-04-04 PROCEDURE — 96375 TX/PRO/DX INJ NEW DRUG ADDON: CPT

## 2025-04-04 PROCEDURE — 80053 COMPREHEN METABOLIC PANEL: CPT

## 2025-04-04 PROCEDURE — 36415 COLL VENOUS BLD VENIPUNCTURE: CPT

## 2025-04-04 PROCEDURE — 96365 THER/PROPH/DIAG IV INF INIT: CPT

## 2025-04-04 PROCEDURE — 83690 ASSAY OF LIPASE: CPT

## 2025-04-04 PROCEDURE — 85025 COMPLETE CBC W/AUTO DIFF WBC: CPT

## 2025-04-04 PROCEDURE — 84484 ASSAY OF TROPONIN QUANT: CPT

## 2025-04-04 RX ORDER — ACETAMINOPHEN 10 MG/ML
1000 INJECTION, SOLUTION INTRAVENOUS ONCE
Status: COMPLETED | OUTPATIENT
Start: 2025-04-04 | End: 2025-04-04

## 2025-04-04 RX ORDER — ONDANSETRON 2 MG/ML
4 INJECTION INTRAMUSCULAR; INTRAVENOUS ONCE
Status: COMPLETED | OUTPATIENT
Start: 2025-04-04 | End: 2025-04-04

## 2025-04-04 RX ADMIN — ONDANSETRON 4 MG: 2 INJECTION, SOLUTION INTRAMUSCULAR; INTRAVENOUS at 20:55

## 2025-04-04 RX ADMIN — IOHEXOL 100 ML: 350 INJECTION, SOLUTION INTRAVENOUS at 21:03

## 2025-04-04 RX ADMIN — ACETAMINOPHEN 1000 MG: 10 INJECTION INTRAVENOUS at 20:10

## 2025-04-04 NOTE — ED ATTENDING ATTESTATION
4/4/2025  IGracie MD, saw and evaluated the patient. I have discussed the patient with the resident/non-physician practitioner and agree with the resident's/non-physician practitioner's findings, Plan of Care, and MDM as documented in the resident's/non-physician practitioner's note, except where noted. All available labs and Radiology studies were reviewed.  I was present for key portions of any procedure(s) performed by the resident/non-physician practitioner and I was immediately available to provide assistance.       At this point I agree with the current assessment done in the Emergency Department.  I have conducted an independent evaluation of this patient a history and physical is as follows:    Chief Complaint   Patient presents with    Abdominal Pain     Patient arrived via triage with boyfriend for eval of abd pain, nausea and vomiting for the past 3 days.        49 y.o. female with PMH of DM2 and HLD prseenting with epigastric pain, nausea, vomiting NBNB, no diarrhea.   No fevers, no chest pain, no dyspnea. Pain is pressure-like and radiates to her back. Has been drinking tony seltzer. Poor oral intake today. Has not tried anything for pain.  Prior abdominal surgeries include hysterectomy.     ED Triage Vitals   Temperature Pulse Respirations Blood Pressure SpO2   04/04/25 1909 04/04/25 1909 04/04/25 1909 04/04/25 1909 04/04/25 1909   97.7 °F (36.5 °C) 89 18 139/82 99 %      Temp Source Heart Rate Source Patient Position - Orthostatic VS BP Location FiO2 (%)   04/04/25 1909 04/04/25 1909 04/04/25 1909 04/04/25 1909 --   Oral Monitor Sitting Right arm       Pain Score       04/05/25 0100       2           Vital signs and nursing notes reviewed    CONSTITUTIONAL: female appearing stated age resting in bed, in no acute distress  HEENT: atraumatic, normocephalic. Sclera anicteric, conjunctiva are not injected. Moist oral mucosa  CARDIOVASCULAR/CHEST: RRR, no M/R/G. 2+ radial pulses  PULMONARY:  Patient understands condition, prognosis and need for follow up care. Breathing comfortably on RA. Breath sounds are equal and clear to auscultation  ABDOMEN: non-distended. BS present, normoactive.  Tender to palpation epigastrium and right upper quadrant.  MSK: moves all extremities, no deformities, no peripheral edema, no calf asymmetry  NEURO: Awake, alert, and oriented x 3. Face symmetric. Moves all extremities spontaneously. No focal neurologic deficits  SKIN: Warm, appears well-perfused  MENTAL STATUS: Normal affect    Labs Reviewed   COMPREHENSIVE METABOLIC PANEL - Abnormal       Result Value Ref Range Status    Sodium 139  135 - 147 mmol/L Final    Potassium 3.6  3.5 - 5.3 mmol/L Final    Chloride 104  96 - 108 mmol/L Final    CO2 29  21 - 32 mmol/L Final    ANION GAP 6  4 - 13 mmol/L Final    BUN 11  5 - 25 mg/dL Final    Creatinine 0.57 (*) 0.60 - 1.30 mg/dL Final    Comment: Standardized to IDMS reference method    Glucose 114  65 - 140 mg/dL Final    Comment: If the patient is fasting, the ADA then defines impaired fasting glucose as > 100 mg/dL and diabetes as > or equal to 123 mg/dL.    Calcium 9.3  8.4 - 10.2 mg/dL Final    AST 21  13 - 39 U/L Final    ALT 31  7 - 52 U/L Final    Comment: Specimen collection should occur prior to Sulfasalazine administration due to the potential for falsely depressed results.     Alkaline Phosphatase 58  34 - 104 U/L Final    Total Protein 7.2  6.4 - 8.4 g/dL Final    Albumin 4.3  3.5 - 5.0 g/dL Final    Total Bilirubin 0.37  0.20 - 1.00 mg/dL Final    Comment: Use of this assay is not recommended for patients undergoing treatment with eltrombopag due to the potential for falsely elevated results.  N-acetyl-p-benzoquinone imine (metabolite of Acetaminophen) will generate erroneously low results in samples for patients that have taken an overdose of Acetaminophen.    eGFR 109  ml/min/1.73sq m Final    Narrative:     National Kidney Disease Foundation guidelines for Chronic Kidney Disease (CKD):     Stage 1 with normal or high GFR (GFR >  "90 mL/min/1.73 square meters)    Stage 2 Mild CKD (GFR = 60-89 mL/min/1.73 square meters)    Stage 3A Moderate CKD (GFR = 45-59 mL/min/1.73 square meters)    Stage 3B Moderate CKD (GFR = 30-44 mL/min/1.73 square meters)    Stage 4 Severe CKD (GFR = 15-29 mL/min/1.73 square meters)    Stage 5 End Stage CKD (GFR <15 mL/min/1.73 square meters)  Note: GFR calculation is accurate only with a steady state creatinine   HS TROPONIN I 0HR - Normal    hs TnI 0hr <2  \"Refer to ACS Flowchart\"- see link ng/L Final    Comment:                                              Initial (time 0) result  If >=50 ng/L, Myocardial injury suggested ;  Type of myocardial injury and treatment strategy  to be determined.  If 5-49 ng/L, a delta result at 2 hours and or 4 hours will be needed to further evaluate.  If <4 ng/L, and chest pain has been >3 hours since onset, patient may qualify for discharge based on the HEART score in the ED.  If <5 ng/L and <3hours since onset of chest pain, a delta result at 2 hours will be needed to further evaluate.    HS Troponin 99th Percentile URL of a Health Population=12 ng/L with a 95% Confidence Interval of 8-18 ng/L.    Second Troponin (time 2 hours)  If calculated delta >= 20 ng/L,  Myocardial injury suggested ; Type of myocardial injury and treatment strategy to be determined.  If 5-49 ng/L and the calculated delta is 5-19 ng/L, consult medical service for evaluation.  Continue evaluation for ischemia on ecg and other possible etiology and repeat hs troponin at 4 hours.  If delta is <5 ng/L at 2 hours, consider discharge based on risk stratification via the HEART score (if in ED), or SRIRAM risk score in IP/Observation.    HS Troponin 99th Percentile URL of a Health Population=12 ng/L with a 95% Confidence Interval of 8-18 ng/L.   LIPASE - Normal    Lipase 39  11 - 82 u/L Final   CBC AND DIFFERENTIAL    WBC 6.94  4.31 - 10.16 Thousand/uL Final    RBC 4.22  3.81 - 5.12 Million/uL Final    Hemoglobin 12.9  " 11.5 - 15.4 g/dL Final    Hematocrit 37.2  34.8 - 46.1 % Final    MCV 88  82 - 98 fL Final    MCH 30.6  26.8 - 34.3 pg Final    MCHC 34.7  31.4 - 37.4 g/dL Final    RDW 13.2  11.6 - 15.1 % Final    MPV 11.0  8.9 - 12.7 fL Final    Platelets 238  149 - 390 Thousands/uL Final    nRBC 0  /100 WBCs Final    Segmented % 58  43 - 75 % Final    Immature Grans % 0  0 - 2 % Final    Lymphocytes % 32  14 - 44 % Final    Monocytes % 8  4 - 12 % Final    Eosinophils Relative 2  0 - 6 % Final    Basophils Relative 0  0 - 1 % Final    Absolute Neutrophils 4.00  1.85 - 7.62 Thousands/µL Final    Absolute Immature Grans 0.02  0.00 - 0.20 Thousand/uL Final    Absolute Lymphocytes 2.23  0.60 - 4.47 Thousands/µL Final    Absolute Monocytes 0.55  0.17 - 1.22 Thousand/µL Final    Eosinophils Absolute 0.12  0.00 - 0.61 Thousand/µL Final    Basophils Absolute 0.02  0.00 - 0.10 Thousands/µL Final     CT abdomen pelvis with contrast   Final Result      Findings suggestive of acute cholecystitis. Gallbladder ultrasound recommended         Workstation performed: [id number]             ED Course     Medications   acetaminophen (Ofirmev) injection 1,000 mg (0 mg Intravenous Stopped 4/4/25 2050)   ondansetron (ZOFRAN) injection 4 mg (4 mg Intravenous Given 4/4/25 2055)   iohexol (OMNIPAQUE) 350 MG/ML injection (MULTI-DOSE) 100 mL (100 mL Intravenous Given 4/4/25 2103)   ceFAZolin (ANCEF) IVPB (premix in dextrose) 2,000 mg 50 mL ( Intravenous MAR Unhold 4/5/25 1006)   magnesium sulfate 2 g/50 mL IVPB (premix) 2 g (2 g Intravenous New Bag 4/5/25 5803)     49-year-old female presenting with lower chest/epigastric abdominal pain.  Vital signs reviewed, within normal limits.  EKG without STEMI.  Labs reveal unremarkable CMP and lipase, negative troponin, normal CBC.  CT of the abdomen and pelvis is concerning for acute cholecystitis.  Patient admitted to surgery service with plan for OR.    Procedure  ECG 12 Lead Documentation Only    Date/Time:  4/4/2025 8:15 PM    Performed by: Gracie Robert MD  Authorized by: Gracie Robert MD    Comments:      Normal sinus rhythm, ventricular rate 68, MS interval 138, , QTc 444, normal axis, poor R wave progression, no STEMI, compared to prior EKG dated 9/2/2021, poor R wave progression.

## 2025-04-04 NOTE — LETTER
[facility]  [address]  [place] [id number]  No information on file.    April 5, 2025     Patient: Betty Shane   YOB: 1975   Date of Visit: 4/4/2025       To Whom it May Concern:    Betty Shane is under my professional care. She was seen in the hospital from 4/4/2025 to 04/05/25. She may return to work on 04/12/2025 with the following limitations : no strenuous activity or heavy lifting (more than 10-15lbs) for 2 weeks .    If you have any questions or concerns, please don't hesitate to call.         Sincerely,          Harris Villeda MD

## 2025-04-05 ENCOUNTER — ANESTHESIA EVENT (OUTPATIENT)
Dept: PERIOP | Facility: HOSPITAL | Age: 50
End: 2025-04-05
Payer: COMMERCIAL

## 2025-04-05 ENCOUNTER — ANESTHESIA (OUTPATIENT)
Dept: PERIOP | Facility: HOSPITAL | Age: 50
End: 2025-04-05
Payer: COMMERCIAL

## 2025-04-05 VITALS
SYSTOLIC BLOOD PRESSURE: 127 MMHG | RESPIRATION RATE: 18 BRPM | TEMPERATURE: 97.2 F | DIASTOLIC BLOOD PRESSURE: 71 MMHG | HEART RATE: 84 BPM | OXYGEN SATURATION: 100 %

## 2025-04-05 PROBLEM — K81.0 ACUTE CHOLECYSTITIS: Status: ACTIVE | Noted: 2025-04-05

## 2025-04-05 LAB
ALBUMIN SERPL BCG-MCNC: 4 G/DL (ref 3.5–5)
ALP SERPL-CCNC: 56 U/L (ref 34–104)
ALT SERPL W P-5'-P-CCNC: 28 U/L (ref 7–52)
ANION GAP SERPL CALCULATED.3IONS-SCNC: 6 MMOL/L (ref 4–13)
AST SERPL W P-5'-P-CCNC: 19 U/L (ref 13–39)
BASOPHILS # BLD AUTO: 0.02 THOUSANDS/ÂΜL (ref 0–0.1)
BASOPHILS NFR BLD AUTO: 0 % (ref 0–1)
BILIRUB SERPL-MCNC: 0.42 MG/DL (ref 0.2–1)
BUN SERPL-MCNC: 8 MG/DL (ref 5–25)
CALCIUM SERPL-MCNC: 8.9 MG/DL (ref 8.4–10.2)
CHLORIDE SERPL-SCNC: 105 MMOL/L (ref 96–108)
CO2 SERPL-SCNC: 29 MMOL/L (ref 21–32)
CREAT SERPL-MCNC: 0.55 MG/DL (ref 0.6–1.3)
EOSINOPHIL # BLD AUTO: 0.1 THOUSAND/ÂΜL (ref 0–0.61)
EOSINOPHIL NFR BLD AUTO: 2 % (ref 0–6)
ERYTHROCYTE [DISTWIDTH] IN BLOOD BY AUTOMATED COUNT: 13.1 % (ref 11.6–15.1)
GFR SERPL CREATININE-BSD FRML MDRD: 111 ML/MIN/1.73SQ M
GLUCOSE P FAST SERPL-MCNC: 84 MG/DL (ref 65–99)
GLUCOSE SERPL-MCNC: 84 MG/DL (ref 65–140)
HCT VFR BLD AUTO: 36.2 % (ref 34.8–46.1)
HGB BLD-MCNC: 12.6 G/DL (ref 11.5–15.4)
IMM GRANULOCYTES # BLD AUTO: 0.01 THOUSAND/UL (ref 0–0.2)
IMM GRANULOCYTES NFR BLD AUTO: 0 % (ref 0–2)
LYMPHOCYTES # BLD AUTO: 2.01 THOUSANDS/ÂΜL (ref 0.6–4.47)
LYMPHOCYTES NFR BLD AUTO: 36 % (ref 14–44)
MAGNESIUM SERPL-MCNC: 1.8 MG/DL (ref 1.9–2.7)
MCH RBC QN AUTO: 30.4 PG (ref 26.8–34.3)
MCHC RBC AUTO-ENTMCNC: 34.8 G/DL (ref 31.4–37.4)
MCV RBC AUTO: 87 FL (ref 82–98)
MONOCYTES # BLD AUTO: 0.47 THOUSAND/ÂΜL (ref 0.17–1.22)
MONOCYTES NFR BLD AUTO: 9 % (ref 4–12)
NEUTROPHILS # BLD AUTO: 2.95 THOUSANDS/ÂΜL (ref 1.85–7.62)
NEUTS SEG NFR BLD AUTO: 53 % (ref 43–75)
NRBC BLD AUTO-RTO: 0 /100 WBCS
PHOSPHATE SERPL-MCNC: 3.6 MG/DL (ref 2.7–4.5)
PLATELET # BLD AUTO: 229 THOUSANDS/UL (ref 149–390)
PLATELET # BLD AUTO: 230 THOUSANDS/UL (ref 149–390)
PMV BLD AUTO: 10.9 FL (ref 8.9–12.7)
PMV BLD AUTO: 11.1 FL (ref 8.9–12.7)
POTASSIUM SERPL-SCNC: 3.7 MMOL/L (ref 3.5–5.3)
PROT SERPL-MCNC: 6.7 G/DL (ref 6.4–8.4)
RBC # BLD AUTO: 4.15 MILLION/UL (ref 3.81–5.12)
SODIUM SERPL-SCNC: 140 MMOL/L (ref 135–147)
WBC # BLD AUTO: 5.56 THOUSAND/UL (ref 4.31–10.16)

## 2025-04-05 PROCEDURE — 85049 AUTOMATED PLATELET COUNT: CPT | Performed by: SURGERY

## 2025-04-05 PROCEDURE — 85025 COMPLETE CBC W/AUTO DIFF WBC: CPT | Performed by: SURGERY

## 2025-04-05 PROCEDURE — 47562 LAPAROSCOPIC CHOLECYSTECTOMY: CPT | Performed by: SURGERY

## 2025-04-05 PROCEDURE — 88304 TISSUE EXAM BY PATHOLOGIST: CPT | Performed by: PATHOLOGY

## 2025-04-05 PROCEDURE — 36415 COLL VENOUS BLD VENIPUNCTURE: CPT | Performed by: SURGERY

## 2025-04-05 PROCEDURE — 96367 TX/PROPH/DG ADDL SEQ IV INF: CPT

## 2025-04-05 PROCEDURE — 96366 THER/PROPH/DIAG IV INF ADDON: CPT

## 2025-04-05 PROCEDURE — 80053 COMPREHEN METABOLIC PANEL: CPT | Performed by: SURGERY

## 2025-04-05 PROCEDURE — 83735 ASSAY OF MAGNESIUM: CPT | Performed by: SURGERY

## 2025-04-05 PROCEDURE — 84100 ASSAY OF PHOSPHORUS: CPT | Performed by: SURGERY

## 2025-04-05 RX ORDER — MAGNESIUM HYDROXIDE 1200 MG/15ML
LIQUID ORAL AS NEEDED
Status: DISCONTINUED | OUTPATIENT
Start: 2025-04-05 | End: 2025-04-05 | Stop reason: HOSPADM

## 2025-04-05 RX ORDER — CEFAZOLIN SODIUM 2 G/50ML
2000 SOLUTION INTRAVENOUS
Status: COMPLETED | OUTPATIENT
Start: 2025-04-05 | End: 2025-04-05

## 2025-04-05 RX ORDER — PROPRANOLOL HYDROCHLORIDE 80 MG/1
80 CAPSULE, EXTENDED RELEASE ORAL DAILY
Status: DISCONTINUED | OUTPATIENT
Start: 2025-04-05 | End: 2025-04-05 | Stop reason: HOSPADM

## 2025-04-05 RX ORDER — METRONIDAZOLE 500 MG/100ML
500 INJECTION, SOLUTION INTRAVENOUS EVERY 12 HOURS
Status: DISCONTINUED | OUTPATIENT
Start: 2025-04-05 | End: 2025-04-05 | Stop reason: HOSPADM

## 2025-04-05 RX ORDER — ONDANSETRON 2 MG/ML
INJECTION INTRAMUSCULAR; INTRAVENOUS AS NEEDED
Status: DISCONTINUED | OUTPATIENT
Start: 2025-04-05 | End: 2025-04-05

## 2025-04-05 RX ORDER — PROPOFOL 10 MG/ML
INJECTION, EMULSION INTRAVENOUS CONTINUOUS PRN
Status: DISCONTINUED | OUTPATIENT
Start: 2025-04-05 | End: 2025-04-05

## 2025-04-05 RX ORDER — ROCURONIUM BROMIDE 10 MG/ML
INJECTION, SOLUTION INTRAVENOUS AS NEEDED
Status: DISCONTINUED | OUTPATIENT
Start: 2025-04-05 | End: 2025-04-05

## 2025-04-05 RX ORDER — DEXAMETHASONE SODIUM PHOSPHATE 10 MG/ML
INJECTION, SOLUTION INTRAMUSCULAR; INTRAVENOUS AS NEEDED
Status: DISCONTINUED | OUTPATIENT
Start: 2025-04-05 | End: 2025-04-05

## 2025-04-05 RX ORDER — ONDANSETRON 2 MG/ML
4 INJECTION INTRAMUSCULAR; INTRAVENOUS ONCE AS NEEDED
Status: DISCONTINUED | OUTPATIENT
Start: 2025-04-05 | End: 2025-04-05 | Stop reason: HOSPADM

## 2025-04-05 RX ORDER — SUCCINYLCHOLINE/SOD CL,ISO/PF 100 MG/5ML
SYRINGE (ML) INTRAVENOUS AS NEEDED
Status: DISCONTINUED | OUTPATIENT
Start: 2025-04-05 | End: 2025-04-05

## 2025-04-05 RX ORDER — PANTOPRAZOLE SODIUM 40 MG/1
40 TABLET, DELAYED RELEASE ORAL
Status: DISCONTINUED | OUTPATIENT
Start: 2025-04-05 | End: 2025-04-05 | Stop reason: HOSPADM

## 2025-04-05 RX ORDER — FENTANYL CITRATE 50 UG/ML
INJECTION, SOLUTION INTRAMUSCULAR; INTRAVENOUS AS NEEDED
Status: DISCONTINUED | OUTPATIENT
Start: 2025-04-05 | End: 2025-04-05

## 2025-04-05 RX ORDER — FAMOTIDINE 20 MG/1
20 TABLET, FILM COATED ORAL 2 TIMES DAILY
Status: DISCONTINUED | OUTPATIENT
Start: 2025-04-05 | End: 2025-04-05 | Stop reason: HOSPADM

## 2025-04-05 RX ORDER — EZETIMIBE 10 MG/1
10 TABLET ORAL DAILY
Status: DISCONTINUED | OUTPATIENT
Start: 2025-04-05 | End: 2025-04-05 | Stop reason: HOSPADM

## 2025-04-05 RX ORDER — ACETAMINOPHEN 325 MG/1
650 TABLET ORAL EVERY 6 HOURS PRN
Status: DISCONTINUED | OUTPATIENT
Start: 2025-04-05 | End: 2025-04-05 | Stop reason: HOSPADM

## 2025-04-05 RX ORDER — HYDROMORPHONE HCL/PF 1 MG/ML
0.5 SYRINGE (ML) INJECTION
Status: DISCONTINUED | OUTPATIENT
Start: 2025-04-05 | End: 2025-04-05 | Stop reason: HOSPADM

## 2025-04-05 RX ORDER — HEPARIN SODIUM 5000 [USP'U]/ML
5000 INJECTION, SOLUTION INTRAVENOUS; SUBCUTANEOUS EVERY 8 HOURS SCHEDULED
Status: DISCONTINUED | OUTPATIENT
Start: 2025-04-05 | End: 2025-04-05 | Stop reason: HOSPADM

## 2025-04-05 RX ORDER — SODIUM CHLORIDE, SODIUM LACTATE, POTASSIUM CHLORIDE, CALCIUM CHLORIDE 600; 310; 30; 20 MG/100ML; MG/100ML; MG/100ML; MG/100ML
INJECTION, SOLUTION INTRAVENOUS CONTINUOUS PRN
Status: DISCONTINUED | OUTPATIENT
Start: 2025-04-05 | End: 2025-04-05

## 2025-04-05 RX ORDER — PRAVASTATIN SODIUM 40 MG
40 TABLET ORAL
Status: DISCONTINUED | OUTPATIENT
Start: 2025-04-05 | End: 2025-04-05 | Stop reason: HOSPADM

## 2025-04-05 RX ORDER — ACETAMINOPHEN 325 MG/1
650 TABLET ORAL EVERY 6 HOURS PRN
COMMUNITY
Start: 2025-04-05

## 2025-04-05 RX ORDER — LIDOCAINE HYDROCHLORIDE 10 MG/ML
INJECTION, SOLUTION EPIDURAL; INFILTRATION; INTRACAUDAL; PERINEURAL AS NEEDED
Status: DISCONTINUED | OUTPATIENT
Start: 2025-04-05 | End: 2025-04-05

## 2025-04-05 RX ORDER — OXYCODONE HYDROCHLORIDE 5 MG/1
5 TABLET ORAL EVERY 4 HOURS PRN
Qty: 20 TABLET | Refills: 0 | Status: SHIPPED | OUTPATIENT
Start: 2025-04-05 | End: 2025-04-15

## 2025-04-05 RX ORDER — ONDANSETRON 2 MG/ML
4 INJECTION INTRAMUSCULAR; INTRAVENOUS EVERY 6 HOURS PRN
Status: DISCONTINUED | OUTPATIENT
Start: 2025-04-05 | End: 2025-04-05 | Stop reason: HOSPADM

## 2025-04-05 RX ORDER — HYDROMORPHONE HCL IN WATER/PF 6 MG/30 ML
0.2 PATIENT CONTROLLED ANALGESIA SYRINGE INTRAVENOUS
Refills: 0 | Status: DISCONTINUED | OUTPATIENT
Start: 2025-04-05 | End: 2025-04-05 | Stop reason: HOSPADM

## 2025-04-05 RX ORDER — MAGNESIUM SULFATE HEPTAHYDRATE 40 MG/ML
2 INJECTION, SOLUTION INTRAVENOUS ONCE
Status: COMPLETED | OUTPATIENT
Start: 2025-04-05 | End: 2025-04-05

## 2025-04-05 RX ORDER — OXYCODONE HYDROCHLORIDE 5 MG/1
5 TABLET ORAL EVERY 4 HOURS PRN
Refills: 0 | Status: DISCONTINUED | OUTPATIENT
Start: 2025-04-05 | End: 2025-04-05 | Stop reason: HOSPADM

## 2025-04-05 RX ORDER — KETOROLAC TROMETHAMINE 30 MG/ML
INJECTION, SOLUTION INTRAMUSCULAR; INTRAVENOUS AS NEEDED
Status: DISCONTINUED | OUTPATIENT
Start: 2025-04-05 | End: 2025-04-05

## 2025-04-05 RX ORDER — OXYCODONE HYDROCHLORIDE 10 MG/1
10 TABLET ORAL EVERY 4 HOURS PRN
Refills: 0 | Status: DISCONTINUED | OUTPATIENT
Start: 2025-04-05 | End: 2025-04-05 | Stop reason: HOSPADM

## 2025-04-05 RX ORDER — BUPIVACAINE HYDROCHLORIDE 5 MG/ML
INJECTION, SOLUTION EPIDURAL; INTRACAUDAL; PERINEURAL AS NEEDED
Status: DISCONTINUED | OUTPATIENT
Start: 2025-04-05 | End: 2025-04-05 | Stop reason: HOSPADM

## 2025-04-05 RX ORDER — MIDAZOLAM HYDROCHLORIDE 2 MG/2ML
INJECTION, SOLUTION INTRAMUSCULAR; INTRAVENOUS AS NEEDED
Status: DISCONTINUED | OUTPATIENT
Start: 2025-04-05 | End: 2025-04-05

## 2025-04-05 RX ORDER — PROPOFOL 10 MG/ML
INJECTION, EMULSION INTRAVENOUS AS NEEDED
Status: DISCONTINUED | OUTPATIENT
Start: 2025-04-05 | End: 2025-04-05

## 2025-04-05 RX ORDER — FENTANYL CITRATE/PF 50 MCG/ML
25 SYRINGE (ML) INJECTION
Status: DISCONTINUED | OUTPATIENT
Start: 2025-04-05 | End: 2025-04-05 | Stop reason: HOSPADM

## 2025-04-05 RX ORDER — SODIUM CHLORIDE, SODIUM GLUCONATE, SODIUM ACETATE, POTASSIUM CHLORIDE, MAGNESIUM CHLORIDE, SODIUM PHOSPHATE, DIBASIC, AND POTASSIUM PHOSPHATE .53; .5; .37; .037; .03; .012; .00082 G/100ML; G/100ML; G/100ML; G/100ML; G/100ML; G/100ML; G/100ML
125 INJECTION, SOLUTION INTRAVENOUS CONTINUOUS
Status: DISCONTINUED | OUTPATIENT
Start: 2025-04-05 | End: 2025-04-05 | Stop reason: HOSPADM

## 2025-04-05 RX ORDER — LEVOTHYROXINE SODIUM 150 UG/1
150 TABLET ORAL
Status: DISCONTINUED | OUTPATIENT
Start: 2025-04-05 | End: 2025-04-05 | Stop reason: HOSPADM

## 2025-04-05 RX ADMIN — Medication 100 MG: at 09:53

## 2025-04-05 RX ADMIN — HEPARIN SODIUM 5000 UNITS: 5000 INJECTION INTRAVENOUS; SUBCUTANEOUS at 05:25

## 2025-04-05 RX ADMIN — MIDAZOLAM 2 MG: 1 INJECTION INTRAMUSCULAR; INTRAVENOUS at 09:48

## 2025-04-05 RX ADMIN — SODIUM CHLORIDE, SODIUM GLUCONATE, SODIUM ACETATE, POTASSIUM CHLORIDE AND MAGNESIUM CHLORIDE 125 ML/HR: 526; 502; 368; 37; 30 INJECTION, SOLUTION INTRAVENOUS at 03:08

## 2025-04-05 RX ADMIN — PROPOFOL 200 MG: 10 INJECTION, EMULSION INTRAVENOUS at 09:53

## 2025-04-05 RX ADMIN — FENTANYL CITRATE 50 MCG: 50 INJECTION INTRAMUSCULAR; INTRAVENOUS at 10:11

## 2025-04-05 RX ADMIN — LIDOCAINE HYDROCHLORIDE 50 MG: 10 INJECTION, SOLUTION EPIDURAL; INFILTRATION; INTRACAUDAL at 09:53

## 2025-04-05 RX ADMIN — SODIUM CHLORIDE, SODIUM LACTATE, POTASSIUM CHLORIDE, AND CALCIUM CHLORIDE: .6; .31; .03; .02 INJECTION, SOLUTION INTRAVENOUS at 09:47

## 2025-04-05 RX ADMIN — SUGAMMADEX 200 MG: 100 INJECTION, SOLUTION INTRAVENOUS at 11:37

## 2025-04-05 RX ADMIN — ROCURONIUM 10 MG: 50 INJECTION, SOLUTION INTRAVENOUS at 10:47

## 2025-04-05 RX ADMIN — PROPOFOL 100 MCG/KG/MIN: 10 INJECTION, EMULSION INTRAVENOUS at 10:00

## 2025-04-05 RX ADMIN — ONDANSETRON 4 MG: 2 INJECTION INTRAMUSCULAR; INTRAVENOUS at 11:33

## 2025-04-05 RX ADMIN — METRONIDAZOLE 500 MG: 500 INJECTION, SOLUTION INTRAVENOUS at 00:48

## 2025-04-05 RX ADMIN — OXYCODONE HYDROCHLORIDE 5 MG: 5 TABLET ORAL at 13:57

## 2025-04-05 RX ADMIN — FENTANYL CITRATE 50 MCG: 50 INJECTION INTRAMUSCULAR; INTRAVENOUS at 09:53

## 2025-04-05 RX ADMIN — CEFAZOLIN SODIUM 2000 MG: 2 SOLUTION INTRAVENOUS at 09:56

## 2025-04-05 RX ADMIN — METRONIDAZOLE 500 MG: 500 INJECTION, SOLUTION INTRAVENOUS at 14:36

## 2025-04-05 RX ADMIN — ROCURONIUM 40 MG: 50 INJECTION, SOLUTION INTRAVENOUS at 09:56

## 2025-04-05 RX ADMIN — KETOROLAC TROMETHAMINE 15 MG: 30 INJECTION, SOLUTION INTRAMUSCULAR; INTRAVENOUS at 11:33

## 2025-04-05 RX ADMIN — FENTANYL CITRATE 25 MCG: 50 INJECTION INTRAMUSCULAR; INTRAVENOUS at 11:55

## 2025-04-05 RX ADMIN — PANTOPRAZOLE SODIUM 40 MG: 40 TABLET, DELAYED RELEASE ORAL at 05:25

## 2025-04-05 RX ADMIN — LEVOTHYROXINE SODIUM 150 MCG: 150 TABLET ORAL at 05:25

## 2025-04-05 RX ADMIN — SODIUM CHLORIDE, SODIUM LACTATE, POTASSIUM CHLORIDE, AND CALCIUM CHLORIDE: .6; .31; .03; .02 INJECTION, SOLUTION INTRAVENOUS at 10:39

## 2025-04-05 RX ADMIN — DEXAMETHASONE SODIUM PHOSPHATE 10 MG: 10 INJECTION INTRAMUSCULAR; INTRAVENOUS at 10:04

## 2025-04-05 RX ADMIN — CEFTRIAXONE SODIUM 1000 MG: 10 INJECTION, POWDER, FOR SOLUTION INTRAVENOUS at 02:06

## 2025-04-05 RX ADMIN — MAGNESIUM SULFATE HEPTAHYDRATE 2 G: 40 INJECTION, SOLUTION INTRAVENOUS at 07:53

## 2025-04-05 RX ADMIN — HYDROMORPHONE HYDROCHLORIDE 0.2 MG: 0.2 INJECTION, SOLUTION INTRAMUSCULAR; INTRAVENOUS; SUBCUTANEOUS at 12:28

## 2025-04-05 NOTE — ASSESSMENT & PLAN NOTE
49-year-old female with past medical history of diabetes mellitus, GERD, hypertension, PCOS and hysterectomy who presents with acute calculus cholecystitis    Plan:  Admit to surgery service  Plan for OR for laparoscopic cholecystectomy in the morning  N.p.o., sips with meds  Isolyte at 125  Rocephin/Flagyl  As needed analgesia  As needed antiemetics  DVT prophylaxis

## 2025-04-05 NOTE — ANESTHESIA PREPROCEDURE EVALUATION
Procedure:  CHOLECYSTECTOMY LAPAROSCOPIC (Abdomen)    Relevant Problems   CARDIO   (+) Hyperlipidemia   (+) Intractable migraine without status migrainosus      ENDO   (+) Hypothyroidism   (+) Type 2 diabetes mellitus with obesity  (HCC)      GI/HEPATIC   (+) Gastroesophageal reflux disease without esophagitis      MUSCULOSKELETAL   (+) Chronic bilateral low back pain without sciatica      NEURO/PSYCH   (+) Chronic bilateral low back pain without sciatica   (+) Intractable migraine without status migrainosus      Weekly dosing ozempic, last dose yesterday  Denies any current N/V  Last meal 4/4 in AM, sips of water with meds  Uses insulin for DM2        Physical Exam    Airway    Mallampati score: II  TM Distance: >3 FB  Neck ROM: full     Dental   No notable dental hx     Cardiovascular  Cardiovascular exam normal    Pulmonary  Pulmonary exam normal     Other Findings  post-pubertal.      Anesthesia Plan  ASA Score- 2     Anesthesia Type- general with ASA Monitors.         Additional Monitors:     Airway Plan: ETT.           Plan Factors-    Chart reviewed. EKG reviewed.  Existing labs reviewed. Patient summary reviewed.                  Induction- intravenous.    Postoperative Plan-     Perioperative Resuscitation Plan - Level 1 - Full Code.       Informed Consent- Anesthetic plan and risks discussed with patient and son.  I personally reviewed this patient with the CRNA. Discussed and agreed on the Anesthesia Plan with the CRNA..      NPO Status:  No vitals data found for the desired time range.

## 2025-04-05 NOTE — ED PROVIDER NOTES
Time reflects when diagnosis was documented in both MDM as applicable and the Disposition within this note       Time User Action Codes Description Comment    4/4/2025 11:46 PM Raymond Voss Add [K81.0] Acute cholecystitis     4/5/2025 12:38 AM Gracie Robert Add [R10.13] Epigastric pain           ED Disposition       ED Disposition   Admit    Condition   Stable    Date/Time   Sat Apr 5, 2025 12:38 AM    Comment   Case was discussed with Dr. Cooley and the patient will be admitted the service of Dr. Torres .             Assessment & Plan       Medical Decision Making  48 y/o patient with RUQ abdominal pain, consistent with acute cholecystitis confirmed on CT imaging. Abdominal exam without peritoneal signs. No evidence of acute abdomen at this time. Less likely to represent acute pancreatitis (neg lipase), PUD (including gastric perforation), acute infectious processes (pneumonia, hepatitis, pyelonephritis), atypical appendicitis, vascular catastrophe, bowel obstruction or viscus perforation, or acute coronary syndrome. Presentation not consistent with other acute, emergent causes of abdominal pain at this time. Patient has cholecystitis with no signs of cholangitis, patient given ceftriaxone and flagyl, surgery consulted and patient to be admitted for further management and evaluation.    Amount and/or Complexity of Data Reviewed  Labs: ordered.  Radiology: ordered. Decision-making details documented in ED Course.    Risk  Prescription drug management.  Decision regarding hospitalization.        ED Course as of 04/05/25 0206   Fri Apr 04, 2025 2046 EKG Interpretation    Rate:  62  BPM  Rhythm:  Normal Sinus Rhythm   Axis:  Normal   Intervals: Normal, no blocks, QTc  444 ms  Q waves:  No pathologic Q waves   T waves:  Normal   ST segments:  No significant elevations or depressions     Impression:  Normal sinus rhythm without evidence of acute ischemia or significant arrhythmia      EKG for comparison:  09/02/2021    EKG interpreted by me.        Sat Apr 05, 2025   0021 CT abdomen pelvis with contrast  Findings suggestive of acute cholecystitis.       Medications   multi-electrolyte (Plasmalyte-A/Isolyte-S PH 7.4/Normosol-R) IV solution (has no administration in time range)   acetaminophen (TYLENOL) tablet 650 mg (has no administration in time range)   oxyCODONE (ROXICODONE) immediate release tablet 10 mg (has no administration in time range)   oxyCODONE (ROXICODONE) IR tablet 5 mg (has no administration in time range)   heparin (porcine) subcutaneous injection 5,000 Units (has no administration in time range)   ondansetron (ZOFRAN) injection 4 mg (has no administration in time range)   HYDROmorphone HCl (DILAUDID) injection 0.2 mg (has no administration in time range)   ceftriaxone (ROCEPHIN) 1 g/50 mL in dextrose IVPB (has no administration in time range)   metroNIDAZOLE (FLAGYL) IVPB (premix) 500 mg 100 mL (500 mg Intravenous New Bag 4/5/25 0048)   ezetimibe (ZETIA) tablet 10 mg (has no administration in time range)   famotidine (PEPCID) tablet 20 mg (has no administration in time range)   levothyroxine tablet 150 mcg (has no administration in time range)   pantoprazole (PROTONIX) EC tablet 40 mg (has no administration in time range)   propranolol (INDERAL LA) 24 hr capsule 80 mg (has no administration in time range)   pravastatin (PRAVACHOL) tablet 40 mg (has no administration in time range)   acetaminophen (Ofirmev) injection 1,000 mg (0 mg Intravenous Stopped 4/4/25 2050)   ondansetron (ZOFRAN) injection 4 mg (4 mg Intravenous Given 4/4/25 2055)   iohexol (OMNIPAQUE) 350 MG/ML injection (MULTI-DOSE) 100 mL (100 mL Intravenous Given 4/4/25 2103)       ED Risk Strat Scores                            SBIRT 22yo+      Flowsheet Row Most Recent Value   Initial Alcohol Screen: US AUDIT-C     1. How often do you have a drink containing alcohol? 0 Filed at: 04/04/2025 1910   2. How many drinks containing alcohol do  you have on a typical day you are drinking?  0 Filed at: 04/04/2025 1910   3a. Male UNDER 65: How often do you have five or more drinks on one occasion? 0 Filed at: 04/04/2025 1910   3b. FEMALE Any Age, or MALE 65+: How often do you have 4 or more drinks on one occassion? 0 Filed at: 04/04/2025 1910   Audit-C Score 0 Filed at: 04/04/2025 1910   CHARLETTE: How many times in the past year have you...    Used an illegal drug or used a prescription medication for non-medical reasons? Never Filed at: 04/04/2025 1910                            History of Present Illness       Chief Complaint   Patient presents with    Abdominal Pain     Patient arrived via triage with boyfriend for eval of abd pain, nausea and vomiting for the past 3 days.        Past Medical History:   Diagnosis Date    Ankle swelling     Calf pain     with walking    Constipation     Diabetes mellitus (HCC)     Disease of thyroid gland     Double vision     GERD (gastroesophageal reflux disease)     H/O: hysterectomy     Headache     History of hysterectomy 9/9/2021    History of hysterectomy 9/9/2021    Hypertension     Joint pain     Numbness     Palpitations     Polycystic ovary syndrome     SOB (shortness of breath)       Past Surgical History:   Procedure Laterality Date    HYSTERECTOMY      34      Family History   Problem Relation Age of Onset    Hypertension Mother     Heart disease Mother     Diabetes Mother     Thyroid disease Mother     Thyroid disease Father     Diabetes Father     Hypertension Father     Hypertension Sister     Heart attack Sister     Diabetes Sister     Heart disease Sister     No Known Problems Daughter     No Known Problems Maternal Grandmother     No Known Problems Maternal Grandfather     No Known Problems Paternal Grandmother     No Known Problems Paternal Grandfather     No Known Problems Brother     Hypertension Brother     Heart disease Brother     Heart attack Brother     Diabetes Brother     Cancer Neg Hx       Social  History     Tobacco Use    Smoking status: Never    Smokeless tobacco: Never   Vaping Use    Vaping status: Never Used   Substance Use Topics    Alcohol use: Yes     Comment: rare    Drug use: Never      E-Cigarette/Vaping    E-Cigarette Use Never User       E-Cigarette/Vaping Substances    Nicotine No     THC No     CBD No     Flavoring No     Other No     Unknown No       I have reviewed and agree with the history as documented.     Pt is a 49 y.o. female who presents to the ED on April 4, 2025. Patient presents with worsening epigastric and RUQ abdominal pain that initially started approximately 3 days ago with associated nausea and nonbloody nonbilious emesis.  Patient denies any recent changes in medication, new foods, trauma.  No real inciting events described.  Patient denies any fever, chills, diarrhea, constipation.  Was initially still tolerating p.o., however has not had anything to eat today due to nausea.  Patient also denies any hematuria, dysuria, suprapubic pain, flank pain, back pain, chest pain, shortness of breath, neurologic, ambulatory dysfunction.  Has not taken any medication for pain management.  Denied any remitting or exacerbating factors.  ROS otherwise negative.  No other concerns at this time.      Abdominal Pain      Review of Systems   Gastrointestinal:  Positive for abdominal pain.           Objective       ED Triage Vitals   Temperature Pulse Blood Pressure Respirations SpO2 Patient Position - Orthostatic VS   04/04/25 1909 04/04/25 1909 04/04/25 1909 04/04/25 1909 04/04/25 1909 04/04/25 1909   97.7 °F (36.5 °C) 89 139/82 18 99 % Sitting      Temp Source Heart Rate Source BP Location FiO2 (%) Pain Score    04/04/25 1909 04/04/25 1909 04/04/25 1909 -- 04/05/25 0100    Oral Monitor Right arm  2      Vitals      Date and Time Temp Pulse SpO2 Resp BP Pain Score FACES Pain Rating User   04/05/25 0100 98.2 °F (36.8 °C) 71 -- 18 118/76 2 -- BM   04/04/25 2330 -- 67 99 % 18 111/53 -- -- CG    04/04/25 2130 -- 68 98 % 18 117/59 -- -- CG   04/04/25 2100 -- 63 100 % 18 114/69 -- --    04/04/25 1909 97.7 °F (36.5 °C) 89 99 % 18 139/82 -- -- DB            Physical Exam  Vitals and nursing note reviewed.   Constitutional:       General: She is not in acute distress.     Appearance: She is well-developed.   HENT:      Head: Normocephalic and atraumatic.   Eyes:      Conjunctiva/sclera: Conjunctivae normal.   Cardiovascular:      Rate and Rhythm: Normal rate and regular rhythm.      Heart sounds: No murmur heard.  Pulmonary:      Effort: Pulmonary effort is normal. No respiratory distress.      Breath sounds: Normal breath sounds. No wheezing, rhonchi or rales.   Abdominal:      Palpations: Abdomen is soft.      Tenderness: There is abdominal tenderness in the right upper quadrant and epigastric area. There is no right CVA tenderness or left CVA tenderness. Positive signs include Fuentes's sign.   Musculoskeletal:         General: No swelling.      Cervical back: Neck supple.   Skin:     General: Skin is warm and dry.      Capillary Refill: Capillary refill takes less than 2 seconds.   Neurological:      Mental Status: She is alert.   Psychiatric:         Mood and Affect: Mood normal.         Results Reviewed       Procedure Component Value Units Date/Time    Platelet count [[id number]]  (Normal) Collected: 04/05/25 0048    Lab Status: Final result Specimen: Blood from Arm, Right Updated: 04/05/25 0120     Platelets 229 Thousands/uL      MPV 11.1 fL     HS Troponin 0hr (reflex protocol) [[id number]]  (Normal) Collected: 04/04/25 2009    Lab Status: Final result Specimen: Blood from Arm, Right Updated: 04/04/25 2045     hs TnI 0hr <2 ng/L     Comprehensive metabolic panel [[id number]]  (Abnormal) Collected: 04/04/25 2009    Lab Status: Final result Specimen: Blood from Arm, Right Updated: 04/04/25 2038     Sodium 139 mmol/L      Potassium 3.6 mmol/L      Chloride 104 mmol/L      CO2 29 mmol/L      ANION GAP 6  mmol/L      BUN 11 mg/dL      Creatinine 0.57 mg/dL      Glucose 114 mg/dL      Calcium 9.3 mg/dL      AST 21 U/L      ALT 31 U/L      Alkaline Phosphatase 58 U/L      Total Protein 7.2 g/dL      Albumin 4.3 g/dL      Total Bilirubin 0.37 mg/dL      eGFR 109 ml/min/1.73sq m     Narrative:      [employer] guidelines for Chronic Kidney Disease (CKD):     Stage 1 with normal or high GFR (GFR > 90 mL/min/1.73 square meters)    Stage 2 Mild CKD (GFR = 60-89 mL/min/1.73 square meters)    Stage 3A Moderate CKD (GFR = 45-59 mL/min/1.73 square meters)    Stage 3B Moderate CKD (GFR = 30-44 mL/min/1.73 square meters)    Stage 4 Severe CKD (GFR = 15-29 mL/min/1.73 square meters)    Stage 5 End Stage CKD (GFR <15 mL/min/1.73 square meters)  Note: GFR calculation is accurate only with a steady state creatinine    Lipase [[id number]]  (Normal) Collected: 04/04/25 2009    Lab Status: Final result Specimen: Blood from Arm, Right Updated: 04/04/25 2038     Lipase 39 u/L     CBC and differential [[id number]] Collected: 04/04/25 2009    Lab Status: Final result Specimen: Blood from Arm, Right Updated: 04/04/25 2020     WBC 6.94 Thousand/uL      RBC 4.22 Million/uL      Hemoglobin 12.9 g/dL      Hematocrit 37.2 %      MCV 88 fL      MCH 30.6 pg      MCHC 34.7 g/dL      RDW 13.2 %      MPV 11.0 fL      Platelets 238 Thousands/uL      nRBC 0 /100 WBCs      Segmented % 58 %      Immature Grans % 0 %      Lymphocytes % 32 %      Monocytes % 8 %      Eosinophils Relative 2 %      Basophils Relative 0 %      Absolute Neutrophils 4.00 Thousands/µL      Absolute Immature Grans 0.02 Thousand/uL      Absolute Lymphocytes 2.23 Thousands/µL      Absolute Monocytes 0.55 Thousand/µL      Eosinophils Absolute 0.12 Thousand/µL      Basophils Absolute 0.02 Thousands/µL             CT abdomen pelvis with contrast   Final Interpretation by Abril Smiley MD (04/04 0130)      Findings suggestive of acute cholecystitis. Gallbladder  ultrasound recommended         Workstation performed: [id number]             Procedures    ED Medication and Procedure Management   Prior to Admission Medications   Prescriptions Last Dose Informant Patient Reported? Taking?   SUMAtriptan (IMITREX) 50 mg tablet   No No   Sig: Take 1 tablet (50 mg total) by mouth once as needed for migraine for up to 1 dose may repeat in 2 hours if necessary   baclofen 10 mg tablet   No No   Sig: Take 1 tablet (10 mg total) by mouth 3 (three) times a day as needed for muscle spasms   ezetimibe (ZETIA) 10 mg tablet   No No   Sig: Take 1 tablet (10 mg total) by mouth daily   famotidine (PEPCID) 20 mg tablet  Spouse/Significant Other No No   Sig: TAKE 1 TABLET BY MOUTH TWICE A DAY   levothyroxine 150 mcg tablet   No No   Sig: Take 1 tablet (150 mcg total) by mouth daily   lidocaine (LMX) 4 % cream  Spouse/Significant Other No No   Sig: Apply topically as needed for mild pain   metoclopramide (REGLAN) 5 mg tablet   No No   Sig: Take 1 tablet (5 mg total) by mouth 4 (four) times a day   Patient not taking: Reported on 2/4/2025   naproxen (Naprosyn) 500 mg tablet   No No   Sig: Take 1 tablet (500 mg total) by mouth 2 (two) times a day with meals for 14 days   omega-3-acid ethyl esters (LOVAZA) 1 g capsule   No No   Sig: Take 2 capsules (2 g total) by mouth 2 (two) times a day   pantoprazole (PROTONIX) 40 mg tablet   No No   Sig: Take 1 tablet (40 mg total) by mouth daily before breakfast   polyethylene glycol (GLYCOLAX) 17 GM/SCOOP powder  Spouse/Significant Other No No   Sig: Take 17 g by mouth 2 (two) times a day   Patient not taking: Reported on 11/22/2024   propranolol (INDERAL LA) 80 mg 24 hr capsule   No No   Sig: Take 1 capsule (80 mg total) by mouth daily   rosuvastatin (CRESTOR) 40 MG tablet   No No   Sig: Take 1 tablet (40 mg total) by mouth daily   semaglutide, 2 mg/dose, (Ozempic) 8 mg/ mL injection pen   No No   Sig: Inject 0.75 mL (2 mg total) under the skin every 7 days       Facility-Administered Medications Last Administration Doses Remaining   lidocaine (XYLOCAINE) 1 % injection 1 mL 6/12/2024  8:30 AM    lidocaine (XYLOCAINE) 1 % injection 1 mL 7/24/2024  8:15 AM    triamcinolone acetonide (KENALOG-40) 40 mg/mL injection 40 mg 6/12/2024  8:30 AM    triamcinolone acetonide (KENALOG-40) 40 mg/mL injection 40 mg 7/24/2024  8:15 AM         Current Discharge Medication List        CONTINUE these medications which have NOT CHANGED    Details   baclofen 10 mg tablet Take 1 tablet (10 mg total) by mouth 3 (three) times a day as needed for muscle spasms  Qty: 90 tablet, Refills: 0    Associated Diagnoses: Intractable migraine without status migrainosus, unspecified migraine type      ezetimibe (ZETIA) 10 mg tablet Take 1 tablet (10 mg total) by mouth daily  Qty: 90 tablet, Refills: 3    Associated Diagnoses: Mixed hyperlipidemia      famotidine (PEPCID) 20 mg tablet TAKE 1 TABLET BY MOUTH TWICE A DAY  Qty: 180 tablet, Refills: 2    Associated Diagnoses: Gastroesophageal reflux disease without esophagitis      levothyroxine 150 mcg tablet Take 1 tablet (150 mcg total) by mouth daily  Qty: 90 tablet, Refills: 1    Associated Diagnoses: Hypothyroidism, unspecified type      lidocaine (LMX) 4 % cream Apply topically as needed for mild pain  Qty: 28 g, Refills: 12    Associated Diagnoses: Chronic bilateral low back pain without sciatica      metoclopramide (REGLAN) 5 mg tablet Take 1 tablet (5 mg total) by mouth 4 (four) times a day  Qty: 30 tablet, Refills: 0    Associated Diagnoses: Intractable migraine without status migrainosus, unspecified migraine type      naproxen (Naprosyn) 500 mg tablet Take 1 tablet (500 mg total) by mouth 2 (two) times a day with meals for 14 days  Qty: 28 tablet, Refills: 0    Associated Diagnoses: Intractable migraine without status migrainosus, unspecified migraine type      omega-3-acid ethyl esters (LOVAZA) 1 g capsule Take 2 capsules (2 g total) by mouth 2  (two) times a day  Qty: 360 capsule, Refills: 3    Associated Diagnoses: Mixed hyperlipidemia      pantoprazole (PROTONIX) 40 mg tablet Take 1 tablet (40 mg total) by mouth daily before breakfast  Qty: 90 tablet, Refills: 1    Associated Diagnoses: Gastroesophageal reflux disease without esophagitis      polyethylene glycol (GLYCOLAX) 17 GM/SCOOP powder Take 17 g by mouth 2 (two) times a day  Qty: 578 g, Refills: 0    Associated Diagnoses: Constipation, unspecified constipation type      propranolol (INDERAL LA) 80 mg 24 hr capsule Take 1 capsule (80 mg total) by mouth daily  Qty: 90 capsule, Refills: 0    Associated Diagnoses: Intractable migraine without status migrainosus, unspecified migraine type      rosuvastatin (CRESTOR) 40 MG tablet Take 1 tablet (40 mg total) by mouth daily  Qty: 100 tablet, Refills: 3    Associated Diagnoses: Mixed hyperlipidemia      semaglutide, 2 mg/dose, (Ozempic) 8 mg/ mL injection pen Inject 0.75 mL (2 mg total) under the skin every 7 days  Qty: 9 mL, Refills: 0    Associated Diagnoses: Type 2 diabetes mellitus with obesity  (HCC)      SUMAtriptan (IMITREX) 50 mg tablet Take 1 tablet (50 mg total) by mouth once as needed for migraine for up to 1 dose may repeat in 2 hours if necessary  Qty: 10 tablet, Refills: 5    Associated Diagnoses: Intractable migraine without status migrainosus, unspecified migraine type           No discharge procedures on file.  ED SEPSIS DOCUMENTATION   Time reflects when diagnosis was documented in both MDM as applicable and the Disposition within this note       Time User Action Codes Description Comment    4/4/2025 11:46 PM Raymond Voss [K81.0] Acute cholecystitis     4/5/2025 12:38 AM Gracie Robert [R10.13] Epigastric pain                  Raymond Voss MD  04/05/25 0206

## 2025-04-05 NOTE — H&P
H&P - Surgery-General   Name: Betty Shnae 49 y.o. female I MRN: [id number]  Unit/Bed#: ED-40 I Date of Admission: 4/4/2025   Date of Service: 4/5/2025 I Hospital Day: 0     Assessment & Plan  Acute cholecystitis  49-year-old female with past medical history of diabetes mellitus, GERD, hypertension, PCOS and hysterectomy who presents with acute calculus cholecystitis    Plan:  Admit to surgery service  Plan for OR for laparoscopic cholecystectomy in the morning  N.p.o., sips with meds  Isolyte at 125  Rocephin/Flagyl  As needed analgesia  As needed antiemetics  DVT prophylaxis    History of Present Illness   Betty Shane is a 49 y.o. female who presents with abdominal pain, nausea and vomiting.  Patient reports that the pain started approximately 2 or 3 days ago but has worsened over the last day.  She notes it tends to be worse with eating.  It is located in the right upper quadrant.  She has had associated nausea and 2 episodes of vomiting today.  On presentation lab work was unremarkable but CT imaging showed cholelithiasis with evidence of pericholecystic inflammation.    Review of Systems   Constitutional: Negative.    HENT: Negative.     Eyes: Negative.    Respiratory: Negative.     Cardiovascular: Negative.    Gastrointestinal:  Positive for abdominal pain (RUQ), nausea and vomiting.   Endocrine: Negative.    Genitourinary: Negative.    Musculoskeletal: Negative.    Skin: Negative.    Neurological: Negative.    Psychiatric/Behavioral: Negative.         Historical Information   Past Medical History:   Diagnosis Date    Ankle swelling     Calf pain     with walking    Constipation     Diabetes mellitus (HCC)     Disease of thyroid gland     Double vision     GERD (gastroesophageal reflux disease)     H/O: hysterectomy     Headache     History of hysterectomy 9/9/2021    History of hysterectomy 9/9/2021    Hypertension     Joint pain     Numbness     Palpitations     Polycystic ovary  syndrome     SOB (shortness of breath)      Past Surgical History:   Procedure Laterality Date    HYSTERECTOMY      34     Social History     Tobacco Use    Smoking status: Never    Smokeless tobacco: Never   Vaping Use    Vaping status: Never Used   Substance and Sexual Activity    Alcohol use: Yes     Comment: rare    Drug use: Never    Sexual activity: Not Currently     E-Cigarette/Vaping    E-Cigarette Use Never User      E-Cigarette/Vaping Substances    Nicotine No     THC No     CBD No     Flavoring No     Other No     Unknown No      Family History   Problem Relation Age of Onset    Hypertension Mother     Heart disease Mother     Diabetes Mother     Thyroid disease Mother     Thyroid disease Father     Diabetes Father     Hypertension Father     Hypertension Sister     Heart attack Sister     Diabetes Sister     Heart disease Sister     No Known Problems Daughter     No Known Problems Maternal Grandmother     No Known Problems Maternal Grandfather     No Known Problems Paternal Grandmother     No Known Problems Paternal Grandfather     No Known Problems Brother     Hypertension Brother     Heart disease Brother     Heart attack Brother     Diabetes Brother     Cancer Neg Hx      Social History     Tobacco Use    Smoking status: Never    Smokeless tobacco: Never   Vaping Use    Vaping status: Never Used   Substance and Sexual Activity    Alcohol use: Yes     Comment: rare    Drug use: Never    Sexual activity: Not Currently     Prior to Admission Medications   Prescriptions Last Dose Informant Patient Reported? Taking?   SUMAtriptan (IMITREX) 50 mg tablet   No No   Sig: Take 1 tablet (50 mg total) by mouth once as needed for migraine for up to 1 dose may repeat in 2 hours if necessary   baclofen 10 mg tablet   No No   Sig: Take 1 tablet (10 mg total) by mouth 3 (three) times a day as needed for muscle spasms   ezetimibe (ZETIA) 10 mg tablet   No No   Sig: Take 1 tablet (10 mg total) by mouth daily    famotidine (PEPCID) 20 mg tablet  Spouse/Significant Other No No   Sig: TAKE 1 TABLET BY MOUTH TWICE A DAY   levothyroxine 150 mcg tablet   No No   Sig: Take 1 tablet (150 mcg total) by mouth daily   lidocaine (LMX) 4 % cream  Spouse/Significant Other No No   Sig: Apply topically as needed for mild pain   metoclopramide (REGLAN) 5 mg tablet   No No   Sig: Take 1 tablet (5 mg total) by mouth 4 (four) times a day   Patient not taking: Reported on 2/4/2025   naproxen (Naprosyn) 500 mg tablet   No No   Sig: Take 1 tablet (500 mg total) by mouth 2 (two) times a day with meals for 14 days   omega-3-acid ethyl esters (LOVAZA) 1 g capsule   No No   Sig: Take 2 capsules (2 g total) by mouth 2 (two) times a day   pantoprazole (PROTONIX) 40 mg tablet   No No   Sig: Take 1 tablet (40 mg total) by mouth daily before breakfast   polyethylene glycol (GLYCOLAX) 17 GM/SCOOP powder  Spouse/Significant Other No No   Sig: Take 17 g by mouth 2 (two) times a day   Patient not taking: Reported on 11/22/2024   propranolol (INDERAL LA) 80 mg 24 hr capsule   No No   Sig: Take 1 capsule (80 mg total) by mouth daily   rosuvastatin (CRESTOR) 40 MG tablet   No No   Sig: Take 1 tablet (40 mg total) by mouth daily   semaglutide, 2 mg/dose, (Ozempic) 8 mg/ mL injection pen   No No   Sig: Inject 0.75 mL (2 mg total) under the skin every 7 days      Facility-Administered Medications Last Administration Doses Remaining   lidocaine (XYLOCAINE) 1 % injection 1 mL 6/12/2024  8:30 AM    lidocaine (XYLOCAINE) 1 % injection 1 mL 7/24/2024  8:15 AM    triamcinolone acetonide (KENALOG-40) 40 mg/mL injection 40 mg 6/12/2024  8:30 AM    triamcinolone acetonide (KENALOG-40) 40 mg/mL injection 40 mg 7/24/2024  8:15 AM           Objective :  Temp:  [97.7 °F (36.5 °C)] 97.7 °F (36.5 °C)  HR:  [63-89] 67  BP: (111-139)/(53-82) 111/53  Resp:  [18] 18  SpO2:  [98 %-100 %] 99 %  O2 Device: None (Room air)      Physical Exam  Gen:    NAD  CV:      warm,  well-perfused  Lungs: No respiratory distress  Abd:     soft, tender in right upper quadrant, nondistended  Ext:      no CCE  Neuro: A&Ox3       Lab Results: I have reviewed the following results:  Recent Labs     04/04/25 2009   WBC 6.94   HGB 12.9   HCT 37.2      SODIUM 139   K 3.6      CO2 29   BUN 11   CREATININE 0.57*   GLUC 114   AST 21   ALT 31   ALB 4.3   TBILI 0.37   ALKPHOS 58   HSTNI0 <2       Imaging Results Review: I personally reviewed the following image studies in PACS and associated radiology reports: CT abdomen/pelvis. My interpretation of the radiology images/reports is: Acute calculus cholecystitis.  Other Study Results Review: No additional pertinent studies reviewed.    VTE Pharmacologic Prophylaxis: Heparin  VTE Mechanical Prophylaxis: sequential compression device

## 2025-04-05 NOTE — DISCHARGE INSTR - AVS FIRST PAGE
Follow up:  Following discharge from the hospital call the office in 1-2 days to set up a post operative appointment to be seen in 2 weeks.    [phone]  Call the office if you have increased pain not relieved with pain medicine.  Call the office if you have a fever,redness, the wound opens up, you have pus draining from your incision.       Laparoscopic Cholecystectomy    WHAT YOU SHOULD KNOW:    Cholecystitis is inflammation of your gallbladder. Your gallbladder stores bile, which helps break down the fat that you eat. It also helps remove certain chemicals from your body. You may have a sudden, severe attack (acute cholecystitis) or several mild attacks (chronic cholecystitis).  Laparoscopic cholecystectomy is surgery to remove your gallbladder.  During this surgery, small incisions are made in your abdomen. A small scope and special tools are inserted through these incisions. Your gallbladder is removed from it normal location and taken out of your abdomen.  The incisions are closed with sutures and a small amount of glue is applied over top incisions to help reinforce the incisions to optimize healing.         AFTER YOU LEAVE: Following discharge from the hospital, you may have some questions about your procedure, your activities or your general condition.  These instructions may answer some of your questions and help you adjust during the first few days following your operation.  You can expect to be sore and tender mostly around the incisions. This pain should last approximally 5 days and gradually improve daily.          Incisions:  - You may remove the gauze dressing from your incisions 48 hours after surgery. Underneath this dressing is a tape like dressing called Steri Strips. Leave the steri strips in place for 5-7 days. They may fall off on their own. This is OK.  - You may apply ice to the incisions to help with pain. Avoid heat as this may make the glue tacky   - It is normal to have some bruising,  swelling or mild discoloration around the incision.  If increasing redness or pain develops, call our office immediately.   Do not apply any creams, lotions, or ointments.    Bathing:   - You may shower daily with soap and water the day after the procedure. It is OK to GENTLY wash the incision with soap and water then pat dry.  DO NOT SCRUB. Do NOT soak incision in a tub, pool, or hot tub for 2 weeks.    Diet:   - Resume your normal diet unless specified otherwise.  We recommend you slowly advance your diet. Try to start with softer bland foods and gradually advance as tolerated. Be sure to consume plenty of water.  Avoid alcohol.        Activity/Restrictions:   - The evening following the procedure you should rest as much as possible, sitting, lying or reclining.  you should be sure someone remains with you until the next morning.  Gradually increase your activity daily. Walking 3-4 times daily is good and stairs are ok.  Listen to your body.  If you start to get tired or sore then rest.   - No strenuous activity or exercise for 3-4 weeks.   - No heavy lifting, pushing or pulling.   - No driving for 5 days or while taking narcotics for pain.       Return or work: You may return to work or other activities as soon as your pain is controlled and you feel comfortable. For many people, this is 5 to 7 days after surgery. If your job requires heavy lifting you will need to be on light duty for 2-3 weeks.     Medication:   - If you were given a prescription for Percocet, Norco, or Vicodin for pain be sure to eat prior to taking as these medications as they may cause nausea and vomiting on an empty stomach.    - If you do not want to take stronger medications for pain, you may take Tylenol, Aleve OR Ibuprofen  - DO NOT take Tylenol  (acetaminophen) with these medication for a fever or for further pain control as these medications already contain Tylenol in them. Take one or the other.  Do not exceed more than 4000 mg of  acetaminophen in 24 hours or 3000 mg if you have liver disease.   - If you were given an antibiotic take it until it is finished.    Diet: Eat low-fat foods for 4 to 6 weeks while your body learns to digest fat without a gallbladder. Slowly increase the amount of fat that you eat. We recommend you slowly advance your diet. Try to start with softer bland foods and gradually advance as tolerated.   Be sure to consume plenty of water.  Avoid alcohol.      Contact your healthcare provider if:   You have a fever over 101°F (38°C) or chills.    You have pain or nausea that is not relieved by medicine.    You have redness and swelling around your incisions, or blood or pus is leaking             from your incisions.    You are constipated or have diarrhea.    Your skin or eyes are yellow, or your bowel movements are pale.    You have questions or concerns about your surgery, condition, or care.  Seek care immediately or call 911 if:   You cannot stop vomiting.    Your bowel movements are black or bloody.    You have pain in your abdomen and it is swollen or hard.    Your arm or leg feels warm, tender, and painful. It may look swollen and red.   You feel lightheaded, short of breath, and have chest pain.    You cough up blood.

## 2025-04-05 NOTE — NURSING NOTE
Discharge instructions reviewed with pt and pts son. Verbalized understanding. IV removed. Pt has all belongings. Pt transported to [facility] via wheelchair with family

## 2025-04-05 NOTE — PLAN OF CARE
Problem: PAIN - ADULT  Goal: Verbalizes/displays adequate comfort level or baseline comfort level  Description: Interventions:- Encourage patient to monitor pain and request assistance- Assess pain using appropriate pain scale- Administer analgesics based on type and severity of pain and evaluate response- Implement non-pharmacological measures as appropriate and evaluate response- Consider cultural and social influences on pain and pain management- Notify physician/advanced practitioner if interventions unsuccessful or patient reports new pain  Outcome: Progressing     Problem: INFECTION - ADULT  Goal: Absence or prevention of progression during hospitalization  Description: INTERVENTIONS:- Assess and monitor for signs and symptoms of infection- Monitor lab/diagnostic results- Monitor all insertion sites, i.e. indwelling lines, tubes, and drains- Monitor endotracheal if appropriate and nasal secretions for changes in amount and color- [place] appropriate cooling/warming therapies per order- Administer medications as ordered- Instruct and encourage patient and family to use good hand hygiene technique- Identify and instruct in appropriate isolation precautions for identified infection/condition  Outcome: Progressing  Goal: Absence of fever/infection during neutropenic period  Description: INTERVENTIONS:- Monitor WBC  Outcome: Progressing     Problem: SAFETY ADULT  Goal: Patient will remain free of falls  Description: INTERVENTIONS:- Educate patient/family on patient safety including physical limitations- Instruct patient to call for assistance with activity - Consult OT/PT to assist with strengthening/mobility - Keep Call bell within reach- Keep bed low and locked with side rails adjusted as appropriate- Keep care items and personal belongings within reach- Initiate and maintain comfort rounds- Make Fall Risk Sign visible to staff- Offer Toileting every 2 Hours, in advance of need- Initiate/Maintain bed/chair  alarm- Obtain necessary fall risk management equipment- Apply yellow socks and bracelet for high fall risk patients- Consider moving patient to room near nurses station  Outcome: Progressing  Goal: Maintain or return to baseline ADL function  Description: INTERVENTIONS:-  Assess patient's ability to carry out ADLs; assess patient's baseline for ADL function and identify physical deficits which impact ability to perform ADLs (bathing, care of mouth/teeth, toileting, grooming, dressing, etc.)- Assess/evaluate cause of self-care deficits - Assess range of motion- Assess patient's mobility; develop plan if impaired- Assess patient's need for assistive devices and provide as appropriate- Encourage maximum independence but intervene and supervise when necessary- Involve family in performance of ADLs- Assess for home care needs following discharge - Consider OT consult to assist with ADL evaluation and planning for discharge- Provide patient education as appropriate  Outcome: Progressing  Goal: Maintains/Returns to pre admission functional level  Description: INTERVENTIONS:- Perform AM-PAC 6 Click Basic Mobility/ Daily Activity assessment daily.- Set and communicate daily mobility goal to care team and patient/family/caregiver. - Collaborate with rehabilitation services on mobility goals if consulted- Perform Range of Motion 3 times a day.- Reposition patient every 2 hours.- Dangle patient 3 times a day- Stand patient 3 times a day- Ambulate patient 3 times a day- Out of bed to chair 3 times a day - Out of bed for meals 3 times a day- Out of bed for toileting- Record patient progress and toleration of activity level   Outcome: Progressing     Problem: DISCHARGE PLANNING  Goal: Discharge to home or other facility with appropriate resources  Description: INTERVENTIONS:- Identify barriers to discharge w/patient and caregiver- Arrange for needed discharge resources and transportation as appropriate- Identify discharge learning  needs (meds, wound care, etc.)- Arrange for interpretive services to assist at discharge as needed- Refer to Case Management Department for coordinating discharge planning if the patient needs post-hospital services based on physician/advanced practitioner order or complex needs related to functional status, cognitive ability, or social support system  Outcome: Progressing     Problem: Knowledge Deficit  Goal: Patient/family/caregiver demonstrates understanding of disease process, treatment plan, medications, and discharge instructions  Description: Complete learning assessment and assess knowledge base.Interventions:- Provide teaching at level of understanding- Provide teaching via preferred learning methods  Outcome: Progressing

## 2025-04-05 NOTE — ANESTHESIA POSTPROCEDURE EVALUATION
Post-Op Assessment Note    CV Status:  Stable    Pain management: adequate       Mental Status:  Alert and awake   Hydration Status:  Euvolemic   PONV Controlled:  Controlled   Airway Patency:  Patent     Post Op Vitals Reviewed: Yes    No anethesia notable event occurred.    Staff: Anesthesiologist, CRNA           Last Filed PACU Vitals:  Vitals Value Taken Time   Temp 97.2 °F (36.2 °C) 04/05/25 1215   Pulse 84 04/05/25 1215   /71 04/05/25 1215   Resp 18 04/05/25 1215   SpO2 100 % 04/05/25 1215       Modified Aga:     Vitals Value Taken Time   Activity 2 04/05/25 1215   Respiration 2 04/05/25 1215   Circulation 2 04/05/25 1215   Consciousness 2 04/05/25 1215   Oxygen Saturation 1 04/05/25 1215     Modified Aga Score: 9

## 2025-04-05 NOTE — PLAN OF CARE
Problem: PAIN - ADULT  Goal: Verbalizes/displays adequate comfort level or baseline comfort level  Description: Interventions:- Encourage patient to monitor pain and request assistance- Assess pain using appropriate pain scale- Administer analgesics based on type and severity of pain and evaluate response- Implement non-pharmacological measures as appropriate and evaluate response- Consider cultural and social influences on pain and pain management- Notify physician/advanced practitioner if interventions unsuccessful or patient reports new pain  4/5/2025 0230 by Carmen Whitfield RN  Outcome: Progressing  4/5/2025 0134 by Carmen Whitfield RN  Outcome: Progressing     Problem: INFECTION - ADULT  Goal: Absence or prevention of progression during hospitalization  Description: INTERVENTIONS:- Assess and monitor for signs and symptoms of infection- Monitor lab/diagnostic results- Monitor all insertion sites, i.e. indwelling lines, tubes, and drains- Monitor endotracheal if appropriate and nasal secretions for changes in amount and color- [place] appropriate cooling/warming therapies per order- Administer medications as ordered- Instruct and encourage patient and family to use good hand hygiene technique- Identify and instruct in appropriate isolation precautions for identified infection/condition  4/5/2025 0230 by Carmen Whitfield RN  Outcome: Progressing  4/5/2025 0134 by Carmen Whitfield RN  Outcome: Progressing  Goal: Absence of fever/infection during neutropenic period  Description: INTERVENTIONS:- Monitor WBC  4/5/2025 0230 by Carmen Whitfield RN  Outcome: Progressing  4/5/2025 0134 by Carmen Whitfield RN  Outcome: Progressing     Problem: SAFETY ADULT  Goal: Patient will remain free of falls  Description: INTERVENTIONS:- Educate patient/family on patient safety including physical limitations- Instruct patient to call for assistance with activity - Consult OT/PT to assist with strengthening/mobility - Keep Call  bell within reach- Keep bed low and locked with side rails adjusted as appropriate- Keep care items and personal belongings within reach- Initiate and maintain comfort rounds- Make Fall Risk Sign visible to staff- Offer Toileting every 2 Hours, in advance of need- Initiate/Maintain bed./chair alarm- Obtain necessary fall risk management equipment- Apply yellow socks and bracelet for high fall risk patients- Consider moving patient to room near nurses station  4/5/2025 0230 by Carmen Whitfield RN  Outcome: Progressing  4/5/2025 0134 by Carmen Whitfield RN  Outcome: Progressing  Goal: Maintain or return to baseline ADL function  Description: INTERVENTIONS:-  Assess patient's ability to carry out ADLs; assess patient's baseline for ADL function and identify physical deficits which impact ability to perform ADLs (bathing, care of mouth/teeth, toileting, grooming, dressing, etc.)- Assess/evaluate cause of self-care deficits - Assess range of motion- Assess patient's mobility; develop plan if impaired- Assess patient's need for assistive devices and provide as appropriate- Encourage maximum independence but intervene and supervise when necessary- Involve family in performance of ADLs- Assess for home care needs following discharge - Consider OT consult to assist with ADL evaluation and planning for discharge- Provide patient education as appropriate  4/5/2025 0230 by Carmen Whitfield RN  Outcome: Progressing  4/5/2025 0134 by Carmen Whitfield RN  Outcome: Progressing  Goal: Maintains/Returns to pre admission functional level  Description: INTERVENTIONS:- Perform AM-PAC 6 Click Basic Mobility/ Daily Activity assessment daily.- Set and communicate daily mobility goal to care team and patient/family/caregiver. - Collaborate with rehabilitation services on mobility goals if consulted- Perform Range of Motion 3 times a day.- Reposition patient every 2 hours.- Dangle patient 3 times a day- Stand patient 3 times a day- Ambulate  patient 3 times a day- Out of bed to chair 3 times a day - Out of bed for meals 3 times a day- Out of bed for toileting- Record patient progress and toleration of activity level   4/5/2025 0230 by Carmen Whitfield RN  Outcome: Progressing  4/5/2025 0134 by Carmen Whitfield RN  Outcome: Progressing     Problem: DISCHARGE PLANNING  Goal: Discharge to home or other facility with appropriate resources  Description: INTERVENTIONS:- Identify barriers to discharge w/patient and caregiver- Arrange for needed discharge resources and transportation as appropriate- Identify discharge learning needs (meds, wound care, etc.)- Arrange for interpretive services to assist at discharge as needed- Refer to Case Management Department for coordinating discharge planning if the patient needs post-hospital services based on physician/advanced practitioner order or complex needs related to functional status, cognitive ability, or social support system  4/5/2025 0230 by Carmen Whitfield RN  Outcome: Progressing  4/5/2025 0134 by Carmen Whitfield RN  Outcome: Progressing     Problem: Knowledge Deficit  Goal: Patient/family/caregiver demonstrates understanding of disease process, treatment plan, medications, and discharge instructions  Description: Complete learning assessment and assess knowledge base.Interventions:- Provide teaching at level of understanding- Provide teaching via preferred learning methods  4/5/2025 0230 by Carmen Whitfield RN  Outcome: Progressing  4/5/2025 0134 by Carmen Whitfield RN  Outcome: Progressing

## 2025-04-05 NOTE — OP NOTE
OPERATIVE REPORT  PATIENT NAME: Betty Shane    :  1975  MRN: [id number]  Pt Location: AN OR ROOM 01    SURGERY DATE: 2025    Surgeons and Role:     * Gary Carreon DO - Primary     * Ziggy Cooley MD - Assisting     * Harris Villeda MD - Assisting    Preop Diagnosis:  Acute cholecystitis [K81.0]    Post-Op Diagnosis Codes:     * Acute cholecystitis [K81.0]    Procedure(s):  CHOLECYSTECTOMY LAPAROSCOPIC    Specimen(s):  ID Type Source Tests Collected by Time Destination   1 :  Tissue Gallbladder TISSUE EXAM Gary Carreon DO 2025 1019        Estimated Blood Loss:   Minimal    Drains:  * No LDAs found *    Anesthesia Type:   General    Operative Indications:  Acute cholecystitis [K81.0]      Operative Findings:  Mildly inflamed gallbladder with distended neck.       Complications:   None    Procedure and Technique:  The patient was met in the preoperative holding area and identified by name and wristband.  Patient was then brought brought to the operating arena and again identified by name, wristband and procedure.  The patient was moved to the operating room table and placed in supine position. General anesthesia was induced with endotracheal intubation without complication. The patient received perioperative antibiotics. A timeout was performed prior to incision to ensure correct patient position, procedure, and site.     A vertical supraumbilical incision was made using a 15 blade scalpel. Incision was deepened through the deep dermis and simultaneous fat using blunt dissection with Kocher's. The fascia was visualized and brought up with 2 Kocher's and incised. A finger was inserted and the peritoneum palpated and using finger fracture technique the peritoneum was incised.The underside of the peritoneum was palpated and there was no evidence of any bowel or adhesions in the vicinity area. A 5 mm trocar was then placed under direct vision. The abdomen was  insufflated. The patient tolerated insufflation well. A laparoscope was then entered and there was no evidence of any trauma from the initial trocar placement. 3 additional trochars were then placed in the following positions: An 11 mm trocar was placed in the epigastrium, 2 additional 5 mm trochars were placed on the right costal margin. All trochars were inserted under direct vision and there is no evidence of any injury. The abdomen was inspected and there no abnormality. .The patient was then placed in reverse trendelenberg position. An atraumatic grasper was placed through the lateral port and the gallbladder was grasped and retracted over the dome of the liver.  There were no adhesion between the gallbladder and omentum, and/or other nearby organs that necessitated lysis.  Additional atraumatic grasper was then placed in the infundibulum of the gallbladder was grasped and retracted to the right lower quadrant.  The gallbladder peritoneum was then was incised using Bovie electrocautery. The cystic artery and cystic duct were then circumferentially dissected. The cystic artery cystic duct were then doubly clipped and divided close the gallbladder. The gallbladder was then taken off the liver bed using hook electrocautery. Hemostasis was achieved with electrocautery. The gallbladder was in place an Endo Catch bag. The cystic artery was inspected and there is appeared to be no oozing. Cystic duct was also inspected and the clips were intact and appeared to be no leakage of bile.  The liver bed was lightly irrigated and suctioned showing clear.  The upper abdominal trochars were then removed under direct vision there is no bleeding from trocar site. The 11 mm port was then removed along with the Endo Catch bag containing the gallbladder and the abdomen was allowed to desufflate. The fascia of the epigastric 11 mm port was then closed using 0 Vicryl suture with assistance of Endo Close. The skin of all trochars were  then closed with a 4-0 Monocryl and surgical glue.     The patient tolerated procedure well was taken to the post anesthesia care unit in stable condition. All lap, needle, and instrument counts were correct.  Dr. Carreon was present for the entire procedure.    Patient Disposition:  PACU              SIGNATURE: Harris Villeda MD  DATE: April 5, 2025  TIME: 12:32 PM

## 2025-04-05 NOTE — ANESTHESIA POSTPROCEDURE EVALUATION
Post-Op Assessment Note    CV Status:  Stable    Pain management: adequate       Mental Status:  Sleepy   Hydration Status:  Stable   PONV Controlled:  None   Airway Patency:  Patent     Post Op Vitals Reviewed: Yes    No anethesia notable event occurred.    Staff: CRNA           Last Filed PACU Vitals:  Vitals Value Taken Time   Temp 98.1    Pulse 97    /72    Resp 27    SpO2 100% on 6LFM      Postop VS in PACU noted above, SV non-obstructed

## 2025-04-06 LAB
ATRIAL RATE: 68 BPM
P AXIS: 18 DEGREES
PR INTERVAL: 138 MS
QRS AXIS: 47 DEGREES
QRSD INTERVAL: 102 MS
QT INTERVAL: 418 MS
QTC INTERVAL: 444 MS
T WAVE AXIS: 29 DEGREES
VENTRICULAR RATE: 68 BPM

## 2025-04-06 PROCEDURE — 93010 ELECTROCARDIOGRAM REPORT: CPT | Performed by: STUDENT IN AN ORGANIZED HEALTH CARE EDUCATION/TRAINING PROGRAM

## 2025-04-10 PROCEDURE — 88304 TISSUE EXAM BY PATHOLOGIST: CPT | Performed by: PATHOLOGY

## 2025-04-17 ENCOUNTER — OFFICE VISIT (OUTPATIENT)
Dept: SURGERY | Facility: CLINIC | Age: 50
End: 2025-04-17

## 2025-04-17 ENCOUNTER — HOSPITAL ENCOUNTER (OUTPATIENT)
Dept: RADIOLOGY | Facility: HOSPITAL | Age: 50
Discharge: HOME | End: 2025-04-17
Attending: SURGERY
Payer: COMMERCIAL

## 2025-04-17 VITALS — WEIGHT: 199.8 LBS | HEIGHT: 69 IN | TEMPERATURE: 97.4 F | BODY MASS INDEX: 29.59 KG/M2

## 2025-04-17 DIAGNOSIS — R10.11 RIGHT UPPER QUADRANT ABDOMINAL PAIN: Primary | ICD-10-CM

## 2025-04-17 DIAGNOSIS — Z90.49 STATUS POST LAPAROSCOPIC CHOLECYSTECTOMY: ICD-10-CM

## 2025-04-17 DIAGNOSIS — R10.11 RIGHT UPPER QUADRANT ABDOMINAL PAIN: ICD-10-CM

## 2025-04-17 PROCEDURE — 74177 CT ABD & PELVIS W/CONTRAST: CPT

## 2025-04-17 PROCEDURE — 99024 POSTOP FOLLOW-UP VISIT: CPT | Performed by: SURGERY

## 2025-04-17 RX ADMIN — IOHEXOL 75 ML: 350 INJECTION, SOLUTION INTRAVENOUS at 19:00

## 2025-04-17 NOTE — ASSESSMENT & PLAN NOTE
Plan as stated above.  Continue as needed analgesia.  Low-fat diet as tolerated.  Will need to monitor diarrhea with plans for possible cholestyramine pending CT scan results.  She will follow-up in 2 weeks or sooner depending on CT scan results.  Work note also provided to the the patient; patient does not have light duty restrictions available at work and so she is not cleared to return to work until 6 weeks postop (may 12)     
Patient/Caregiver provided printed discharge information.

## 2025-04-17 NOTE — PROGRESS NOTES
Name: Betty Shane      : 1975      MRN: [id number]  Encounter Provider: Cass Villalba MD  Encounter Date: 2025   Encounter department: [facility] [place]  :  Assessment & Plan  Right upper quadrant abdominal pain  Patient continues to have severe pain of the right upper quadrant 2 weeks postoperatively, with recent fevers.  As such, we will obtain CT abdomen/pelvis to rule out intra-abdominal abscess or other etiology for ongoing pain.  I will call her with the results.    Orders:  •  CT abdomen pelvis w contrast; Future    Status post laparoscopic cholecystectomy  Plan as stated above.  Continue as needed analgesia.  Low-fat diet as tolerated.  Will need to monitor diarrhea with plans for possible cholestyramine pending CT scan results.  She will follow-up in 2 weeks or sooner depending on CT scan results.  Work note also provided to the the patient; patient does not have light duty restrictions available at work and so she is not cleared to return to work until 6 weeks postop (may 12)           History of Present Illness   HPI  Betty Shane is a 49 y.o. female who presents for follow-up status post laparoscopic cholecystectomy on  by Dr. Carreon.  Pathology with mild chronic cholecystitis with cholelithiasis and cholecystitis.  Patient states that she continues to have severe right upper quadrant abdominal pain since the surgery.  It has not improved over time.  It is different from the preop pain that she was having.  She is still requiring oxycodone and Tylenol every 4 hours.  She reports fevers to 39 °C 3 to 4 days ago.  She also reports diarrhea which occurs almost immediately after eating anything.  She denies any nausea or vomiting.      Review of Systems  Medical History Reviewed by provider this encounter:  Tobacco  Allergies  Meds  Problems  Med Hx  Surg Hx  Fam Hx     .     Objective   Temp (!) 97.4 °F (36.3 °C) (Temporal)   Ht  "5' 9\" (1.753 m)   Wt 90.6 kg (199 lb 12.8 oz)   BMI 29.51 kg/m²      Physical Exam  Appears uncomfortable.  Holding right upper quadrant  Abdomen is soft nondistended and mild diffuse tenderness of the right upper quadrant.  Her trocar sites x 4 clean/dry/intact    "

## 2025-04-17 NOTE — LETTER
April 17, 2025     Patient: Betty Shane  YOB: 1975  Date of Visit: 4/17/2025      To Whom it May Concern:    Betty Shane is under my professional care. Betty was seen in my office on 4/17/2025. Betty cannot return to work until 5/12/2025.    If you have any questions or concerns, please don't hesitate to call.         Sincerely,          Cass Villalba MD        CC: No Recipients

## 2025-04-18 ENCOUNTER — RESULTS FOLLOW-UP (OUTPATIENT)
Dept: SURGERY | Facility: CLINIC | Age: 50
End: 2025-04-18

## 2025-04-18 ENCOUNTER — TELEPHONE (OUTPATIENT)
Age: 50
End: 2025-04-18

## 2025-04-18 RX ORDER — CEPHALEXIN 500 MG/1
500 CAPSULE ORAL EVERY 12 HOURS SCHEDULED
Qty: 14 CAPSULE | Refills: 0 | Status: SHIPPED | OUTPATIENT
Start: 2025-04-18 | End: 2025-04-25

## 2025-04-18 NOTE — TELEPHONE ENCOUNTER
Pt son calling in to ask for [facility] paperowrk to be Faxed to [phone] as soon as possible. Pt reports this was dropped off in office yesterday.

## 2025-04-24 NOTE — TELEPHONE ENCOUNTER
Patient boyfriend came into office to confirm if [facility] paperwork was faxed. Confirmed with Pat paperwork was faxed 4/23/2025. Boyfriend relayed message to patient on phone while in office.

## 2025-04-25 NOTE — TELEPHONE ENCOUNTER
Son called to tell us her employer is saying they are missing the 4th page to her [place] paperwork.  I didn't see copies in her chart so I called the office and spoke with Pat she asked me to transfer him over and she will speak with him

## 2025-05-08 ENCOUNTER — OFFICE VISIT (OUTPATIENT)
Dept: SURGERY | Facility: CLINIC | Age: 50
End: 2025-05-08

## 2025-05-08 VITALS
DIASTOLIC BLOOD PRESSURE: 75 MMHG | BODY MASS INDEX: 29.15 KG/M2 | SYSTOLIC BLOOD PRESSURE: 116 MMHG | TEMPERATURE: 97.2 F | WEIGHT: 197.4 LBS | HEART RATE: 94 BPM

## 2025-05-08 DIAGNOSIS — Z90.49 HISTORY OF LAPAROSCOPIC CHOLECYSTECTOMY: Primary | ICD-10-CM

## 2025-05-08 PROCEDURE — 99024 POSTOP FOLLOW-UP VISIT: CPT | Performed by: SURGERY

## 2025-05-08 NOTE — PROGRESS NOTES
Name: Betty Shane      : 1975      MRN: [id number]  Encounter Provider: General Surgery Generic Physician Caitlin  Encounter Date: 2025   Encounter department: [facility] GENERAL SURGERY [place]  :  Assessment & Plan  History of laparoscopic cholecystectomy  49-year-old female status post 4/5 laparoscopic cholecystectomy.      Seen  in general surgery office, at which time she was still having severe pain and fevers.  CT abdomen pelvis obtained, which demonstrated fluid collection in the abdominal wall.  Patient was treated with 7 days of Keflex.    Patient overall doing well at this time.     Plan:   - No scheduled follow up with General Surgery  - Can be seen as needed  - Encourage follow up with Primary Care  - Continue routine postoperative care, reviewed restrictions  - Patient may return to work six weeks postoperatively without lifting restriction     History of Present Illness   Betty Shane is a 49-year-old female status post 4/5 laparoscopic cholecystectomy.  Seen  in general surgery office, at which time she was still having severe pain and fevers.  CT abdomen pelvis obtained, which demonstrated fluid collection in the abdominal wall.  Patient was treated with 7 days of Keflex.    Discussed with patient and patient's  who provided interpretation.  Patient feeling overall much better.  Denies pain, nausea, vomiting, fever, chills, shortness of breath, chest pain.  Voiding and having normal bowel movements.  Patient denies additional complaints.  All questions answered at this time.  Discussed return to work, 6 weeks postoperatively due to necessary physical activity/lifting at work.    HPI  Review of Systems as per HPI.    Objective   /75 (BP Location: Right arm, Patient Position: Sitting, Cuff Size: Large)   Pulse 94   Temp (!) 97.2 °F (36.2 °C) (Temporal)   Wt 89.5 kg (197 lb 6.4 oz)   BMI 29.15 kg/m²      Physical Exam   Gen:  NAD,  Neuro: A&O, No focal deficits  Head: Normal Cephalic, Atraumatic  Eye: EOMI, No scleral icterus  CV: Regular rate, Cap refill <2 sec  Pulm: Normal work of breathing, No respiratory distress  Abd: Soft, Non-tender, Non-distended' Incisions well healing   Skin: Warm, Dry, Intact     ---  Iveth Bernard MD  General Surgery PGY-II

## 2025-05-13 ENCOUNTER — APPOINTMENT (OUTPATIENT)
Dept: LAB | Facility: HOSPITAL | Age: 50
End: 2025-05-13
Payer: COMMERCIAL

## 2025-05-15 ENCOUNTER — OFFICE VISIT (OUTPATIENT)
Dept: FAMILY MEDICINE CLINIC | Facility: CLINIC | Age: 50
End: 2025-05-15

## 2025-05-15 VITALS
BODY MASS INDEX: 29.77 KG/M2 | HEART RATE: 96 BPM | RESPIRATION RATE: 16 BRPM | OXYGEN SATURATION: 96 % | WEIGHT: 201 LBS | DIASTOLIC BLOOD PRESSURE: 70 MMHG | TEMPERATURE: 97.6 F | SYSTOLIC BLOOD PRESSURE: 110 MMHG | HEIGHT: 69 IN

## 2025-05-15 DIAGNOSIS — E78.2 MIXED HYPERLIPIDEMIA: ICD-10-CM

## 2025-05-15 DIAGNOSIS — E66.9 TYPE 2 DIABETES MELLITUS WITH OBESITY  (HCC): Primary | ICD-10-CM

## 2025-05-15 DIAGNOSIS — Z12.11 COLON CANCER SCREENING: ICD-10-CM

## 2025-05-15 DIAGNOSIS — E11.69 TYPE 2 DIABETES MELLITUS WITH OBESITY  (HCC): Primary | ICD-10-CM

## 2025-05-15 DIAGNOSIS — Z12.31 ENCOUNTER FOR SCREENING MAMMOGRAM FOR BREAST CANCER: ICD-10-CM

## 2025-05-15 DIAGNOSIS — E03.9 HYPOTHYROIDISM, UNSPECIFIED TYPE: ICD-10-CM

## 2025-05-15 PROCEDURE — 99214 OFFICE O/P EST MOD 30 MIN: CPT

## 2025-05-15 RX ORDER — LEVOTHYROXINE SODIUM 150 UG/1
150 TABLET ORAL DAILY
Qty: 90 TABLET | Refills: 1 | Status: SHIPPED | OUTPATIENT
Start: 2025-05-15

## 2025-05-15 NOTE — ASSESSMENT & PLAN NOTE
Lab Results   Component Value Date    OOM8IKOKQCGS 2.835 05/13/2025   At goal.   Continue levothyroxine.   Orders:    levothyroxine 150 mcg tablet; Take 1 tablet (150 mcg total) by mouth daily

## 2025-05-15 NOTE — ASSESSMENT & PLAN NOTE
Lab Results   Component Value Date    HGBA1C 5.5 05/13/2025     At goal. Continue Ozempic 2 mg weekly.   Orders:    semaglutide, 2 mg/dose, (Ozempic) 8 mg/ mL injection pen; Inject 0.75 mL (2 mg total) under the skin every 7 days

## 2025-05-15 NOTE — PROGRESS NOTES
Name: Betty Shane      : 1975      MRN: [id number]  Encounter Provider: LUZ Jarrett  Encounter Date: 5/15/2025   Encounter department: [facility]  :  Assessment & Plan  Type 2 diabetes mellitus with obesity  (HCC)    Lab Results   Component Value Date    HGBA1C 5.5 2025     At goal. Continue Ozempic 2 mg weekly.   Orders:    semaglutide, 2 mg/dose, (Ozempic) 8 mg/ mL injection pen; Inject 0.75 mL (2 mg total) under the skin every 7 days    Encounter for screening mammogram for breast cancer    Orders:    Mammo screening bilateral w 3d and cad; Future    Colon cancer screening    Orders:    Ambulatory Referral to Gastroenterology; Future    Hypothyroidism, unspecified type  Lab Results   Component Value Date    ZXQ4EDMYHARV 2.835 2025   At goal.   Continue levothyroxine.   Orders:    levothyroxine 150 mcg tablet; Take 1 tablet (150 mcg total) by mouth daily    Mixed hyperlipidemia  Lab Results   Component Value Date    CHOLESTEROL 134 2025    CHOLESTEROL 255 (H) 2025    CHOLESTEROL 274 (H) 11/15/2024     Lab Results   Component Value Date    HDL 41 (L) 2025    HDL 49 (L) 2025    HDL 52 11/15/2024     Lab Results   Component Value Date    TRIG 210 (H) 2025    TRIG 256 (H) 2025    TRIG 223 (H) 11/15/2024     Lab Results   Component Value Date    NONHDLC 145 2022    NONHDLC 197 2022    NONHDLC 191 2022     Lab Results   Component Value Date    LDLCALC 51 2025   Continue omega-3, Crestor 40 mg daily & zetia 10 mg daily.                 History of Present Illness   Betty Shane is a 49 y.o. female  has a past medical history of Ankle swelling, Calf pain, Constipation, Diabetes mellitus (HCC), Disease of thyroid gland, Double vision, GERD (gastroesophageal reflux disease), H/O: hysterectomy, Headache, History of hysterectomy, History of hysterectomy, Hypertension, Joint  "pain, Numbness, Palpitations, Polycystic ovary syndrome, and SOB (shortness of breath).  has a past surgical history that includes Hysterectomy and CHOLECYSTECTOMY LAPAROSCOPIC (N/A, 4/5/2025).    Patient presents today for follow-up of chronic conditions.         Review of Systems   Constitutional:  Negative for chills and fever.   HENT:  Negative for ear pain and sore throat.    Eyes:  Negative for pain and visual disturbance.   Respiratory:  Negative for cough and shortness of breath.    Cardiovascular:  Negative for palpitations.   Gastrointestinal:  Negative for abdominal pain and vomiting.   Genitourinary:  Negative for dysuria and hematuria.   Musculoskeletal:  Negative for arthralgias and back pain.   Skin:  Negative for color change and rash.   Neurological:  Negative for syncope.   All other systems reviewed and are negative.      Objective   /70 (BP Location: Left arm, Patient Position: Sitting, Cuff Size: Standard)   Pulse 96   Temp 97.6 °F (36.4 °C) (Temporal)   Resp 16   Ht 5' 9\" (1.753 m)   Wt 91.2 kg (201 lb)   SpO2 96%   BMI 29.68 kg/m²      Physical Exam  Vitals and nursing note reviewed.   Constitutional:       General: She is not in acute distress.     Appearance: Normal appearance. She is well-developed.   HENT:      Head: Normocephalic and atraumatic.      Right Ear: External ear normal.      Left Ear: External ear normal.      Nose: Nose normal.     Eyes:      Conjunctiva/sclera: Conjunctivae normal.       Cardiovascular:      Rate and Rhythm: Normal rate and regular rhythm.      Pulses: Normal pulses.      Heart sounds: Normal heart sounds. No murmur heard.  Pulmonary:      Effort: Pulmonary effort is normal. No respiratory distress.      Breath sounds: Normal breath sounds.   Abdominal:      Palpations: Abdomen is soft.      Tenderness: There is no abdominal tenderness.     Musculoskeletal:         General: Normal range of motion.      Cervical back: Normal range of motion and " neck supple.     Skin:     General: Skin is warm and dry.     Neurological:      Mental Status: She is alert and oriented to person, place, and time. Mental status is at baseline.     Psychiatric:         Mood and Affect: Mood normal.         Behavior: Behavior normal.         Thought Content: Thought content normal.         Judgment: Judgment normal.

## 2025-05-15 NOTE — ASSESSMENT & PLAN NOTE
Lab Results   Component Value Date    CHOLESTEROL 134 02/11/2025    CHOLESTEROL 255 (H) 01/30/2025    CHOLESTEROL 274 (H) 11/15/2024     Lab Results   Component Value Date    HDL 41 (L) 02/11/2025    HDL 49 (L) 01/30/2025    HDL 52 11/15/2024     Lab Results   Component Value Date    TRIG 210 (H) 02/11/2025    TRIG 256 (H) 01/30/2025    TRIG 223 (H) 11/15/2024     Lab Results   Component Value Date    NONHDLC 145 09/28/2022    NONHDLC 197 06/27/2022    NONHDLC 191 03/23/2022     Lab Results   Component Value Date    LDLCALC 51 02/11/2025   Continue omega-3, Crestor 40 mg daily & zetia 10 mg daily.

## 2025-08-13 ENCOUNTER — OFFICE VISIT (OUTPATIENT)
Dept: FAMILY MEDICINE CLINIC | Facility: CLINIC | Age: 50
End: 2025-08-13

## 2025-08-13 PROBLEM — G89.29 CHRONIC BILATERAL LOW BACK PAIN WITHOUT SCIATICA: Status: RESOLVED | Noted: 2024-02-05 | Resolved: 2025-08-13

## 2025-08-13 PROBLEM — K21.9 GASTROESOPHAGEAL REFLUX DISEASE WITHOUT ESOPHAGITIS: Status: RESOLVED | Noted: 2021-01-11 | Resolved: 2025-08-13

## 2025-08-13 PROBLEM — M54.50 CHRONIC BILATERAL LOW BACK PAIN WITHOUT SCIATICA: Status: RESOLVED | Noted: 2024-02-05 | Resolved: 2025-08-13

## 2025-08-13 PROBLEM — G47.9 DIFFICULTY SLEEPING: Status: RESOLVED | Noted: 2022-01-24 | Resolved: 2025-08-13

## 2025-08-15 ENCOUNTER — HOSPITAL ENCOUNTER (OUTPATIENT)
Dept: RADIOLOGY | Facility: HOSPITAL | Age: 50
End: 2025-08-15
Payer: COMMERCIAL

## 2025-08-20 ENCOUNTER — TELEPHONE (OUTPATIENT)
Dept: FAMILY MEDICINE CLINIC | Facility: CLINIC | Age: 50
End: 2025-08-20

## 2025-08-26 PROBLEM — M54.17 RIGHT LUMBOSACRAL RADICULOPATHY: Status: ACTIVE | Noted: 2025-08-26

## 2025-08-26 PROBLEM — M99.03 SEGMENTAL DYSFUNCTION OF LUMBAR REGION: Status: ACTIVE | Noted: 2025-08-26

## 2025-08-26 PROBLEM — M79.18 MYOFASCIAL PAIN ON RIGHT SIDE: Status: ACTIVE | Noted: 2025-08-26

## 2025-08-26 PROBLEM — M53.3 SACROILIAC JOINT DYSFUNCTION: Status: ACTIVE | Noted: 2025-08-26

## 2025-08-26 PROBLEM — M99.02 SEGMENTAL DYSFUNCTION OF THORACIC REGION: Status: ACTIVE | Noted: 2025-08-26

## (undated) DEVICE — TISSUE RETRIEVAL SYSTEM: Brand: INZII RETRIEVAL SYSTEM

## (undated) DEVICE — CLIP APPLIER: Brand: ENDO CLIP

## (undated) DEVICE — EXOFIN PRECISION PEN HIGH VISCOSITY TOPICAL SKIN ADHESIVE: Brand: EXOFIN PRECISION PEN, 1G

## (undated) DEVICE — INTENDED FOR TISSUE SEPARATION, AND OTHER PROCEDURES THAT REQUIRE A SHARP SURGICAL BLADE TO PUNCTURE OR CUT.: Brand: BARD-PARKER SAFETY BLADES SIZE 15, STERILE

## (undated) DEVICE — SUT VICRYL 0 UR-6 27 IN J603H

## (undated) DEVICE — TUBING SMOKE EVAC W/FILTRATION DEVICE PLUMEPORT ACTIV

## (undated) DEVICE — METZENBAUM ADTEC SINGLE USE DISSECTING SCISSORS, SHAFT ONLY, MONOPOLAR, CURVED TO LEFT, WORKING LENGTH: 12 1/4", (310 MM), DIAM. 5 MM, INSULATED, DOUBLE ACTION, STERILE, DISPOSABLE, PACKAGE OF 10 PIECES: Brand: AESCULAP

## (undated) DEVICE — SYRINGE 10ML LL

## (undated) DEVICE — SUT MONOCRYL 4-0 PS-2 27 IN Y426H

## (undated) DEVICE — TROCAR: Brand: KII® SLEEVE

## (undated) DEVICE — LAPAROSCOPIC WIRE J-HOOK ELECTRODE COATED: Brand: CLEANCOAT

## (undated) DEVICE — PACK PBDS LAP CHOLE RF

## (undated) DEVICE — NEEDLE 25G X 1 1/2

## (undated) DEVICE — INSUFFLATION NEEDLE TO ESTABLISH PNEUMOPERITONEUM.: Brand: INSUFFLATION NEEDLE

## (undated) DEVICE — GLOVE INDICATOR PI UNDERGLOVE SZ 7.5 BLUE

## (undated) DEVICE — Device: Brand: OMNICLOSE TROCAR SITE CLOSURE DEVICE

## (undated) DEVICE — TROCAR: Brand: KII FIOS FIRST ENTRY

## (undated) DEVICE — INTENDED FOR TISSUE SEPARATION, AND OTHER PROCEDURES THAT REQUIRE A SHARP SURGICAL BLADE TO PUNCTURE OR CUT.: Brand: BARD-PARKER SAFETY BLADES SIZE 11, STERILE

## (undated) DEVICE — GLOVE SRG BIOGEL 7.5

## (undated) DEVICE — PENCILETTE PUSH BUTTON COATED

## (undated) DEVICE — SUT VICRYL 2-0 SH 27 IN UNDYED J417H